# Patient Record
Sex: MALE | Race: WHITE | NOT HISPANIC OR LATINO | Employment: FULL TIME | URBAN - METROPOLITAN AREA
[De-identification: names, ages, dates, MRNs, and addresses within clinical notes are randomized per-mention and may not be internally consistent; named-entity substitution may affect disease eponyms.]

---

## 2017-03-25 ENCOUNTER — ALLSCRIPTS OFFICE VISIT (OUTPATIENT)
Dept: OTHER | Facility: OTHER | Age: 52
End: 2017-03-25

## 2017-04-03 ENCOUNTER — ALLSCRIPTS OFFICE VISIT (OUTPATIENT)
Dept: OTHER | Facility: OTHER | Age: 52
End: 2017-04-03

## 2017-04-03 LAB
FLUAV AG SPEC QL IA: NEGATIVE
INFLUENZA B AG (HISTORICAL): NEGATIVE

## 2017-05-23 ENCOUNTER — GENERIC CONVERSION - ENCOUNTER (OUTPATIENT)
Dept: OTHER | Facility: OTHER | Age: 52
End: 2017-05-23

## 2017-06-06 ENCOUNTER — GENERIC CONVERSION - ENCOUNTER (OUTPATIENT)
Dept: OTHER | Facility: OTHER | Age: 52
End: 2017-06-06

## 2017-12-21 ENCOUNTER — ALLSCRIPTS OFFICE VISIT (OUTPATIENT)
Dept: OTHER | Facility: OTHER | Age: 52
End: 2017-12-21

## 2017-12-22 NOTE — PROGRESS NOTES
Assessment   1  Deviated nasal septum (470) (J34 2)   2  Chronic sinusitis (473 9) (J32 9)    Plan   Chronic sinusitis, Deviated nasal septum    · Amoxicillin-Pot Clavulanate 875-125 MG Oral Tablet (Augmentin); TAKE 1 TABLET    EVERY 12 HOURS UNTIL GONE   · 2 - Mary Robbins DO (Otolaryngology) Co-Management  *  Status: Hold For -    Scheduling  Requested for: [id number]  Care Summary provided  : Yes    Discussion/Summary      Discussed with patient will treat for infection and refer to specialist for further evaluation of his deviated septum and chronic sinus issues  Possible side effects of new medications were reviewed with the patient/guardian today  The treatment plan was reviewed with the patient/guardian  The patient/guardian understands and agrees with the treatment plan      Chief Complaint   Sore throat      History of Present Illness   HPI: Patient here with complaints that he is not feeling well and woke up with a sore throat and sinus complaints  Patient has been not feeling well for the past few weeks and having headaches tried OTC medications and just not going away positive sick contacts with son  Patient reports that he is concerned that his left nostril is completely collapsed and has sores on the inside of the nose and has sores and is concerned he needs to have ENT evaluation and needs to see specialist has had history of sinus surgery in the past x3  Patient has a deviated septum  Patient had seen a ENT in [facility] surgery  Patient has oversized turbinates and are affected by allergens or hot and cold admits to overuse on the afrin  Review of Systems        Constitutional: as noted in HPI       ENT: as noted in HPI  Cardiovascular: no complaints of slow or fast heart rate, no chest pain, no palpitations, no leg claudication or lower extremity edema  Respiratory: no complaints of shortness of breath, no wheezing or cough, no dyspnea on exertion, no orthopnea or PND  Gastrointestinal: no complaints of abdominal pain, no constipation, no nausea or vomiting, no diarrhea or bloody stools  ROS reviewed  Active Problems   1  Acute maxillary sinusitis (461 0) (J01 00)   2  Allergic rhinitis due to pollen (477 0) (J30 1)   3  Anxiety disorder (300 00) (F41 9)   4  Benign localized prostatic hyperplasia with lower urinary tract symptoms (LUTS)     (600 21,599 69) (N40 1)   5  Colon cancer screening (V76 51) (Z12 11)   6  Cough (786 2) (R05)   7  Hyperlipidemia (272 4) (E78 5)   8  Testicular hypogonadism (257 2) (E29 1)    Past Medical History   1  Benign localized prostatic hyperplasia with lower urinary tract symptoms (LUTS)     (600 21,599 69) (N40 1)   2  History of Fever and chills (780 60) (R50 9)   3  Testicular hypogonadism (257 2) (E29 1)  Active Problems And Past Medical History Reviewed: The active problems and past medical history were reviewed and updated today  Family History   Mother    1  Family history of glaucoma (V19 11) (Z83 511)   2  Denied: Family history of substance abuse   3  Denied: Family history of Mental problems  Father    4  Denied: Family history of substance abuse   5  Denied: Family history of Mental problems  Family History Reviewed: The family history was reviewed and updated today  Social History    · Denied: Alcohol Use (History)   · Caffeine Use   · Former smoker (W84 24) (C23 850)  The social history was reviewed and updated today  Surgical History   1  History of Surgery Vas Deferens Vasectomy  Surgical History Reviewed: The surgical history was reviewed and updated today  Current Meds    1  Atorvastatin Calcium 10 MG Oral Tablet; take 1 tablet every day; Therapy: [id number] to (Brigitte Fontana)  Requested for: 65ROI5827; Last     Rx:03Oct2016 Ordered   2  Cheratussin -10 MG/5ML Oral Syrup; TAKE 5 ML EVERY 4 TO 6 HOURS AS     NEEDED FOR COUGH;      Therapy: 87GMH6842 to (Evaluate:13Apr2017); Last Rx:03Apr2017 Ordered   3  Fluticasone Propionate 50 MCG/ACT Nasal Suspension; USE 2 SPRAYS IN EACH     NOSTRIL DAILY; Therapy: 96WZL5232 to (Last Rx:26Nov2017)  Requested for: 35MEP6515 Ordered   4  PARoxetine HCl - 30 MG Oral Tablet; take 2 tablets daily; Therapy: 72WXY8199 to (Evaluate:02Jan2018)  Requested for: 57Ytr3407; Last     Rx:[id number] Ordered     The medication list was reviewed and updated today  Allergies   1  Bactrim TABS   2  Demerol TABS   3  Quinolones    Vitals    Recorded: 28Nbt7610 10:11AM   Temperature 97 6 F   Heart Rate 82   Systolic 912   Diastolic 86   Height 5 ft 8 in   Weight 204 lb 2 08 oz   BMI Calculated 31 04   BSA Calculated 2 07   O2 Saturation 96     Physical Exam        Constitutional      General appearance: No acute distress, well appearing and well nourished  Eyes      Conjunctiva and lids: No swelling, erythema, or discharge  Pupils and irises: Equal, round and reactive to light  Ears, Nose, Mouth, and Throat      External inspection of ears and nose: Abnormal   External nose exam: deformity the left ala was collapsed-- and-- the dorsum was deviated  Otoscopic examination: Tympanic membrance translucent with normal light reflex  Canals patent without erythema  Nasal mucosa, septum, and turbinates: Abnormal   There was a purulent discharge from both nares  The bilateral nasal mucosa was boggy  examination of the septum showed deviation to the left      Oropharynx: Normal with no erythema, edema, exudate or lesions  Pulmonary      Respiratory effort: No increased work of breathing or signs of respiratory distress  Auscultation of lungs: Clear to auscultation, equal breath sounds bilaterally, no wheezes, no rales, no rhonci  Cardiovascular      Auscultation of heart: Normal rate and rhythm, normal S1 and S2, without murmurs         Examination of extremities for edema and/or varicosities: Normal        Abdomen Abdomen: Non-tender, no masses  Liver and spleen: No hepatomegaly or splenomegaly  Lymphatic      Palpation of lymph nodes in neck: No lymphadenopathy  Message   Return to work or school:    Philipp Benito is under my professional care   He was seen in my office on 12/21/17    He is able to return to work on  12/26/17             Signatures    Electronically signed by : Barbra Ford ; Dec 21 2017 11:13AM EST                       (Author)     Electronically signed by : Dash Fenton MD; Dec 21 2017 11:53AM EST                       (Co-author)

## 2018-01-12 VITALS
WEIGHT: 207 LBS | DIASTOLIC BLOOD PRESSURE: 78 MMHG | HEART RATE: 87 BPM | OXYGEN SATURATION: 98 % | SYSTOLIC BLOOD PRESSURE: 118 MMHG | BODY MASS INDEX: 31.37 KG/M2 | TEMPERATURE: 97.8 F | HEIGHT: 68 IN

## 2018-01-13 VITALS
BODY MASS INDEX: 31.37 KG/M2 | SYSTOLIC BLOOD PRESSURE: 118 MMHG | OXYGEN SATURATION: 95 % | HEART RATE: 86 BPM | TEMPERATURE: 99 F | HEIGHT: 68 IN | DIASTOLIC BLOOD PRESSURE: 82 MMHG | WEIGHT: 207 LBS

## 2018-01-13 NOTE — RESULT NOTES
Verified Results  (1) CBC/PLT/DIFF 01Oct2016 07:56AM Zack Prado     Test Name Result Flag Reference   WBC 4 6 x10E3/uL  3 4-10 8   RBC 4 07 x10E6/uL L 4 14-5 80   Hemoglobin 13 2 g/dL  12 6-17 7   Hematocrit 39 5 %  37 5-51 0   MCV 97 fL  79-97   MCH 32 4 pg  26 6-33 0   MCHC 33 4 g/dL  31 5-35 7   RDW 13 4 %  12 3-15 4   Platelets 404 S75G4/MR  150-379   Neutrophils 62 %     Lymphs 25 %     Monocytes 9 %     Eos 3 %     Basos 1 %     Neutrophils (Absolute) 2 9 x10E3/uL  1 4-7 0   Lymphs (Absolute) 1 2 x10E3/uL  0 7-3 1   Monocytes(Absolute) 0 4 x10E3/uL  0 1-0 9   Eos (Absolute) 0 2 x10E3/uL  0 0-0 4   Baso (Absolute) 0 0 x10E3/uL  0 0-0 2   Immature Granulocytes 0 %     Immature Grans (Abs) 0 0 x10E3/uL  0 0-0 1     (1) COMPREHENSIVE METABOLIC PANEL [id number] 36:39PX Zack Prado     Test Name Result Flag Reference   Glucose, Serum 97 mg/dL  65-99   BUN 12 mg/dL  6-24   Creatinine, Serum 0 94 mg/dL  0 76-1 27   eGFR If NonAfricn Am 93 mL/min/1 73  >59   eGFR If Africn Am 108 mL/min/1 73  >59   BUN/Creatinine Ratio 13  9-20   Sodium, Serum 140 mmol/L  134-144   Potassium, Serum 4 0 mmol/L  3 5-5 2   Chloride, Serum 100 mmol/L     Carbon Dioxide, Total 20 mmol/L  18-29   Calcium, Serum 9 5 mg/dL  8 7-10 2   Protein, Total, Serum 6 6 g/dL  6 0-8 5   Albumin, Serum 4 5 g/dL  3 5-5 5   Globulin, Total 2 1 g/dL  1 5-4 5   A/G Ratio 2 1  1 1-2 5   Bilirubin, Total 0 7 mg/dL  0 0-1 2   Alkaline Phosphatase, S 70 IU/L     AST (SGOT) 21 IU/L  0-40   ALT (SGPT) 21 IU/L  0-44     (LC) Lipid Panel [id number] 07:56AM Zack Prado     Test Name Result Flag Reference   Cholesterol, Total 263 mg/dL H 100-199   Triglycerides 97 mg/dL  0-149   HDL Cholesterol 60 mg/dL  >39   According to ATP-III Guidelines, HDL-C >59 mg/dL is considered a  negative risk factor for CHD  VLDL Cholesterol Sina 19 mg/dL  5-40   LDL Cholesterol Calc 184 mg/dL H 0-99   Comment: Comment     Possible Familial Hypercholesterolemia  FH should be suspected when  fasting LDL cholesterol is above 189 mg/dL or non-HDL cholesterol  is above 219 mg/dL  A family history of high cholesterol and heart  disease in 1st degree relatives should be collected  J Clin Lipidol  2011;5:133-140     (1) LDL,DIRECT [id number] 07:56AM Garrie Anes     Test Name Result Flag Reference   LDL Chol  (Direct) 204 mg/dL H 0-99     (LC) TSH Rfx on Abnormal to Free T4 01Oct2016 07:56AM Garrie Anes     Test Name Result Flag Reference   TSH 0 762 uIU/mL  0 450-4 500     [facility]) Beverly Rodriguez TKI09 Default [id number] 07:56AM Garrie Anes     Test Name Result Flag Reference   Beverly Rodriguez CMP14 Default Comment     A hand-written panel/profile was received from your office  In  accordance with the [facility] Ambiguous Test Code Policy dated July 2808, we have completed your order by using the closest currently  or formerly recognized AMA panel  We have assigned Comprehensive  Metabolic Panel (14), Test Code #835839 to this request   If this  is not the testing you wished to receive on this specimen, please  contact the [facility] Client Inquiry/Technical Services Department  to clarify the test order  We appreciate your business  [facility]) Beverly Rodriguez LP Default [id number] 07:56AM Aubreyrie Anes     Test Name Result Flag Reference   Ambig Abbrev LP Default Comment     A hand-written panel/profile was received from your office  In  accordance with the [facility] Ambiguous Test Code Policy dated July 5102, we have completed your order by using the closest currently  or formerly recognized AMA panel  We have assigned Lipid Panel,  Test Code #113953 to this request  If this is not the testing you  wished to receive on this specimen, please contact the [facility]  Client Inquiry/Technical Services Department to clarify the test  order  We appreciate your business         Discussion/Summary   labs are good other than the bad cholesterol is very elevated goal is about half what the reading is I would strongly reccomend a statin to protect from cv disease and recheck in three months

## 2018-01-22 VITALS
DIASTOLIC BLOOD PRESSURE: 86 MMHG | HEART RATE: 82 BPM | TEMPERATURE: 97.6 F | SYSTOLIC BLOOD PRESSURE: 124 MMHG | WEIGHT: 204.13 LBS | OXYGEN SATURATION: 96 % | HEIGHT: 68 IN | BODY MASS INDEX: 30.94 KG/M2

## 2018-01-23 NOTE — MISCELLANEOUS
Message  Return to work or school:    Found is under my professional care   He was seen in my office on 12/21/17   He is able to return to work on  12/26/17            Signatures   Electronically signed by : Anselmo Rubinstein, 10 Demi Aceves; Dec 21 2017 11:13AM EST                       (Author)

## 2018-02-13 ENCOUNTER — OFFICE VISIT (OUTPATIENT)
Dept: FAMILY MEDICINE CLINIC | Facility: CLINIC | Age: 53
End: 2018-02-13
Payer: COMMERCIAL

## 2018-02-13 VITALS
TEMPERATURE: 98.5 F | OXYGEN SATURATION: 96 % | HEART RATE: 101 BPM | BODY MASS INDEX: 31.04 KG/M2 | SYSTOLIC BLOOD PRESSURE: 112 MMHG | WEIGHT: 204.8 LBS | DIASTOLIC BLOOD PRESSURE: 84 MMHG | HEIGHT: 68 IN

## 2018-02-13 DIAGNOSIS — M77.12 LATERAL EPICONDYLITIS OF BOTH ELBOWS: ICD-10-CM

## 2018-02-13 DIAGNOSIS — J10.1 INFLUENZA B: Primary | ICD-10-CM

## 2018-02-13 DIAGNOSIS — M77.11 LATERAL EPICONDYLITIS OF BOTH ELBOWS: ICD-10-CM

## 2018-02-13 DIAGNOSIS — R50.9 FEVER AND CHILLS: ICD-10-CM

## 2018-02-13 LAB
SL AMB POCT RAPID FLU A: NORMAL
SL AMB POCT RAPID FLU B: POSITIVE

## 2018-02-13 PROCEDURE — 87804 INFLUENZA ASSAY W/OPTIC: CPT | Performed by: NURSE PRACTITIONER

## 2018-02-13 PROCEDURE — 99214 OFFICE O/P EST MOD 30 MIN: CPT | Performed by: NURSE PRACTITIONER

## 2018-02-13 RX ORDER — PAROXETINE 30 MG/1
60 TABLET, FILM COATED ORAL DAILY
Refills: 3 | COMMUNITY
Start: 2018-01-02 | End: 2018-05-14 | Stop reason: SDUPTHER

## 2018-02-13 RX ORDER — OSELTAMIVIR PHOSPHATE 75 MG/1
75 CAPSULE ORAL 2 TIMES DAILY
Qty: 10 CAPSULE | Refills: 0 | Status: SHIPPED | OUTPATIENT
Start: 2018-02-13 | End: 2018-02-18

## 2018-02-13 NOTE — LETTER
February 13, 2018     Patient: Darwin Vogt   YOB: 1965   Date of Visit: 2/13/2018       To Whom it May Concern:    Betina Underwood is under my professional care  He was seen in my office on 2/13/2018  He may return to work on 2/19/18  If you have any questions or concerns, please don't hesitate to call           Sincerely,          KAREL Egan        CC: No Recipients

## 2018-02-13 NOTE — PROGRESS NOTES
Assessment/Plan:    No problem-specific Assessment & Plan notes found for this encounter  Diagnoses and all orders for this visit:    Influenza B  Comments:  Tamiflu sent for patient dw patient supportive measures and call for any worsening symptoms   Orders:  -     oseltamivir (TAMIFLU) 75 mg capsule; Take 1 capsule (75 mg total) by mouth 2 (two) times a day for 5 days    Fever and chills    Lateral epicondylitis of both elbows  Comments:  DW patient will order the tennis elbow straps and discussed with patient taking NSAIDs as needed   Orders:  -     Tennis elbow strap    Other orders  -     PARoxetine (PAXIL) 30 mg tablet; Take 60 mg by mouth daily          Subjective:      Patient ID: Neal Zhang is a 48 y o  male  Patient here today and reports that he went to work this morning and started feeling headache and body aches sinus headache and sore throat and chills and feeling unwell  Patient eating and drinking and positive 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  Patient also having troubles with his both arms patient having pains in both arms and located by the elbows and doing lots of work with his arms and hands no injuries that he is aware of  History of having tennis elbow in the left arm  And wears a band at times and not wearing currently  No neck pain but some stiffness  The following portions of the patient's history were reviewed and updated as appropriate: He  has no past medical history on file  He  does not have any pertinent problems on file  He  has no past surgical history on file  His family history is not on file  He  reports that he has quit smoking  He does not have any smokeless tobacco history on file  His alcohol and drug histories are not on file  He is allergic to meperidine; quinolones; and sulfamethoxazole-trimethoprim       Review of Systems   Constitutional: Positive for chills, fatigue and fever   Negative for activity change, appetite change, diaphoresis and unexpected weight change  HENT: Positive for postnasal drip, rhinorrhea, sinus pain and sinus pressure  Negative for congestion, ear discharge, ear pain, facial swelling, hearing loss, sneezing and sore throat  Eyes: Negative  Negative for pain, redness and visual disturbance  Respiratory: Negative  Negative for cough and shortness of breath  Cardiovascular: Negative  Negative for chest pain and leg swelling  Gastrointestinal: Negative  Negative for abdominal pain, diarrhea, nausea and vomiting  Endocrine: Negative  Genitourinary: Negative  Musculoskeletal: Positive for arthralgias  Skin: Negative  Neurological: Positive for dizziness and weakness  Negative for light-headedness  Psychiatric/Behavioral: Negative for behavioral problems and dysphoric mood  Objective:    Vitals:    02/13/18 1308   BP: 112/84   Pulse: 101   Temp: 98 5 °F (36 9 °C)   SpO2: 96%        Physical Exam   Constitutional: He is oriented to person, place, and time  Vital signs are normal  He appears well-developed and well-nourished  No distress  Fatigued appearance    HENT:   Head: Normocephalic and atraumatic  Eyes: Pupils are equal, round, and reactive to light  Neck: Normal range of motion  No thyromegaly present  Cardiovascular: Normal rate, regular rhythm, normal heart sounds and intact distal pulses  No murmur heard  Pulmonary/Chest: Effort normal and breath sounds normal  No respiratory distress  He has no wheezes  Abdominal: Soft  Bowel sounds are normal    Musculoskeletal: Normal range of motion  Lateral epicondyle tenderness bilateral    Neurological: He is alert and oriented to person, place, and time  Skin: Skin is warm and dry  Psychiatric: He has a normal mood and affect  Nursing note and vitals reviewed

## 2018-02-13 NOTE — PATIENT INSTRUCTIONS
Complications of Infection   AMBULATORY CARE:   Complications of infection  can happen if an infection is not diagnosed and treated early  Some infections may have complications even when they are treated early  The infection can spread from one place in your body to the entire body through your bloodstream  Early diagnosis and treatment may prevent complications such as bacteremia, sepsis, and septic shock  These are serious, life-threatening conditions that need immediate treatment  · Bacteremia  is when there is bacteria in the blood  Bacteremia can happen when infections in other parts of the body, such as the lungs, kidneys, or skin, travel to the blood  It can also happen when indwelling catheters, such as a central venous access devices, pacemaker wires, or urinary catheters become infected  A central venous access device is a special IV that is placed in a large vein and left there for an extended period of time  · Sepsis  happens when an infection spreads and causes the body to react strongly to the germs  The body's defense system normally releases chemicals to fight off infection at the infected area  In sepsis, chemicals are released throughout the body  The chemicals cause inflammation and can cause clotting in small blood vessels that is difficult to control  Inflammation and clotting decreases blood flow and oxygen to organs  This may cause them to stop working correctly  Sepsis is also called systemic inflammatory response syndrome (SIRS) due to infection  · Septic shock  is a severe type of sepsis that happens as sepsis gets worse and causes multiple organs to shut down  The blood pressure drops very low and organs do not get enough blood  This may cause permanent damage to organs    Signs and symptoms that an infection has become worse:   · Fever or very low body temperature with chills and violent shaking    · Swelling in the ankles or legs    · A change in mental status such as confusion, loss of consciousness, or seizures    · A fast or irregular heartbeat    · Urinating very little or not at all     · Difficulty breathing, dizziness, or weakness    · A rash or warm, red skin  Call 911 or have someone else call for any of the following:   · You have any of the following signs of a heart attack:      ¨ Squeezing, pressure, or pain in your chest that lasts longer than 5 minutes or returns    ¨ Discomfort or pain in your back, neck, jaw, stomach, or arm     ¨ Trouble breathing    ¨ Nausea or vomiting    ¨ Lightheadedness or a sudden cold sweat, especially with chest pain or trouble breathing    · You have a seizure or lose consciousness  · You have trouble breathing  · Your lips or fingernails are blue  · You feel extremely weak and have a hard time moving  Seek care immediately if:   · Your symptoms, such as fever, get worse, even if you are taking medicine to treat the infection  · You have increased swelling in your legs, feet, or abdomen  · You feel weak, dizzy, or faint  · You stop urinating or urinate very little  Contact your healthcare provider if:   · You have questions or concerns about your condition or care  Treatment  may depend on how severe the complications are  You may need monitoring and treatment in the hospital  You may  need any of the following:  · Removal or change of a catheter  may be needed to get rid of the infection  · Medicines  may be given to increase your blood pressure and blood flow to your organs  Antibiotics may be given to treat an infection  Medicines may also be given to decrease inflammation, control your blood sugar, prevent stomach ulcers, and prevent blood clots  · Surgery or other procedures  may be needed to treat problems causing sepsis or related to the complications of your infection  This may include draining an abscess or removing infected tissue    Follow up with your healthcare provider as directed:  Write down your questions so you remember to ask them during your visits  © 2017 [employer] Information is for End User's use only and may not be sold, redistributed or otherwise used for commercial purposes  All illustrations and images included in CareNotes® are the copyrighted property of A [employer]  or [employer]ine Feliciano  The above information is an  only  It is not intended as medical advice for individual conditions or treatments  Talk to your doctor, nurse or pharmacist before following any medical regimen to see if it is safe and effective for you

## 2018-05-14 DIAGNOSIS — F41.9 ANXIETY: Primary | ICD-10-CM

## 2018-05-14 RX ORDER — PAROXETINE 30 MG/1
60 TABLET, FILM COATED ORAL DAILY
Qty: 90 TABLET | Refills: 3 | Status: SHIPPED | OUTPATIENT
Start: 2018-05-14 | End: 2018-11-05 | Stop reason: SDUPTHER

## 2018-09-21 ENCOUNTER — TELEPHONE (OUTPATIENT)
Dept: FAMILY MEDICINE CLINIC | Facility: CLINIC | Age: 53
End: 2018-09-21

## 2018-09-21 NOTE — TELEPHONE ENCOUNTER
Patient's wife stated that she did get him into Ortho today - [facility] @ 1pm  Did you still want to see him?

## 2018-09-21 NOTE — TELEPHONE ENCOUNTER
Bryce Aparicio would like you to call her concerning Confucianism  He was in [facility] er (thurs 9/13-fri 9/14) and then overnight with chest and shoulder pain  They did general blood work and rib cage on the left side xray  She wanted him to be seen today but you have no openings  I told her we have Monday but she said he didn't want to take more time off of work  She thinks he needs other blood work and would like you to call her to determine what to do next, he is still having muscle pain in his shoulder and chest  They tried to get into ortho but no openings

## 2018-10-13 DIAGNOSIS — J40 BRONCHITIS: Primary | ICD-10-CM

## 2018-10-13 RX ORDER — AMOXICILLIN AND CLAVULANATE POTASSIUM 875; 125 MG/1; MG/1
1 TABLET, FILM COATED ORAL EVERY 12 HOURS SCHEDULED
Qty: 14 TABLET | Refills: 0 | Status: SHIPPED | OUTPATIENT
Start: 2018-10-13 | End: 2018-10-20

## 2018-10-13 RX ORDER — METHYLPREDNISOLONE 4 MG/1
TABLET ORAL
Qty: 21 TABLET | Refills: 0 | Status: SHIPPED | OUTPATIENT
Start: 2018-10-13 | End: 2018-12-19

## 2018-10-13 RX ORDER — GUAIFENESIN AND CODEINE PHOSPHATE 100; 10 MG/5ML; MG/5ML
10 SOLUTION ORAL 3 TIMES DAILY PRN
Qty: 180 ML | Refills: 0 | Status: SHIPPED | OUTPATIENT
Start: 2018-10-13 | End: 2018-10-23

## 2018-10-26 DIAGNOSIS — J02.0 PHARYNGITIS DUE TO STREPTOCOCCUS SPECIES: Primary | ICD-10-CM

## 2018-10-26 RX ORDER — AMOXICILLIN AND CLAVULANATE POTASSIUM 875; 125 MG/1; MG/1
1 TABLET, FILM COATED ORAL EVERY 12 HOURS SCHEDULED
Qty: 14 TABLET | Refills: 0 | Status: SHIPPED | OUTPATIENT
Start: 2018-10-26 | End: 2018-11-02

## 2018-11-03 DIAGNOSIS — F41.9 ANXIETY: ICD-10-CM

## 2018-11-05 DIAGNOSIS — F41.9 ANXIETY: ICD-10-CM

## 2018-11-05 RX ORDER — PAROXETINE 30 MG/1
60 TABLET, FILM COATED ORAL DAILY
Qty: 180 TABLET | Refills: 3 | Status: SHIPPED | OUTPATIENT
Start: 2018-11-05 | End: 2019-11-10 | Stop reason: SDUPTHER

## 2018-11-05 RX ORDER — PAROXETINE 30 MG/1
60 TABLET, FILM COATED ORAL DAILY
Qty: 90 TABLET | Refills: 3 | OUTPATIENT
Start: 2018-11-05

## 2018-12-19 ENCOUNTER — APPOINTMENT (OUTPATIENT)
Dept: RADIOLOGY | Facility: CLINIC | Age: 53
End: 2018-12-19
Payer: COMMERCIAL

## 2018-12-19 ENCOUNTER — OFFICE VISIT (OUTPATIENT)
Dept: FAMILY MEDICINE CLINIC | Facility: CLINIC | Age: 53
End: 2018-12-19
Payer: COMMERCIAL

## 2018-12-19 VITALS
HEIGHT: 68 IN | WEIGHT: 213.8 LBS | HEART RATE: 102 BPM | BODY MASS INDEX: 32.4 KG/M2 | OXYGEN SATURATION: 98 % | SYSTOLIC BLOOD PRESSURE: 116 MMHG | DIASTOLIC BLOOD PRESSURE: 78 MMHG | TEMPERATURE: 97.3 F

## 2018-12-19 DIAGNOSIS — M25.50 ARTHRALGIA, UNSPECIFIED JOINT: ICD-10-CM

## 2018-12-19 DIAGNOSIS — Z23 NEEDS FLU SHOT: ICD-10-CM

## 2018-12-19 DIAGNOSIS — J34.89 NASAL SEPTAL PERFORATION: ICD-10-CM

## 2018-12-19 DIAGNOSIS — R06.02 SHORTNESS OF BREATH: ICD-10-CM

## 2018-12-19 DIAGNOSIS — J34.2 DEVIATED SEPTUM: Primary | ICD-10-CM

## 2018-12-19 PROBLEM — J10.1 INFLUENZA B: Status: RESOLVED | Noted: 2018-02-13 | Resolved: 2018-12-19

## 2018-12-19 PROBLEM — R50.9 FEVER AND CHILLS: Status: RESOLVED | Noted: 2018-02-13 | Resolved: 2018-12-19

## 2018-12-19 PROCEDURE — 71046 X-RAY EXAM CHEST 2 VIEWS: CPT

## 2018-12-19 PROCEDURE — 90686 IIV4 VACC NO PRSV 0.5 ML IM: CPT

## 2018-12-19 PROCEDURE — 90471 IMMUNIZATION ADMIN: CPT

## 2018-12-19 PROCEDURE — 99214 OFFICE O/P EST MOD 30 MIN: CPT | Performed by: NURSE PRACTITIONER
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
## 2018-12-20 ENCOUNTER — APPOINTMENT (OUTPATIENT)
Dept: LAB | Facility: HOSPITAL | Age: 53
End: 2018-12-20
Payer: COMMERCIAL

## 2018-12-20 LAB
BILIRUB UR QL STRIP: NEGATIVE
CLARITY UR: CLEAR
COLOR UR: YELLOW
GLUCOSE UR STRIP-MCNC: NEGATIVE MG/DL
HGB UR QL STRIP.AUTO: NEGATIVE
KETONES UR STRIP-MCNC: NEGATIVE MG/DL
LEUKOCYTE ESTERASE UR QL STRIP: NEGATIVE
NITRITE UR QL STRIP: NEGATIVE
PH UR STRIP.AUTO: 7 [PH] (ref 4.5–8)
PROT UR STRIP-MCNC: NEGATIVE MG/DL
SP GR UR STRIP.AUTO: 1.01 (ref 1–1.03)
UROBILINOGEN UR QL STRIP.AUTO: 0.2 E.U./DL

## 2018-12-20 PROCEDURE — 81003 URINALYSIS AUTO W/O SCOPE: CPT | Performed by: NURSE PRACTITIONER

## 2019-01-02 ENCOUNTER — TELEPHONE (OUTPATIENT)
Dept: FAMILY MEDICINE CLINIC | Facility: CLINIC | Age: 54
End: 2019-01-02

## 2019-01-03 ENCOUNTER — TELEPHONE (OUTPATIENT)
Dept: FAMILY MEDICINE CLINIC | Facility: CLINIC | Age: 54
End: 2019-01-03

## 2019-01-03 NOTE — TELEPHONE ENCOUNTER
Gina Roy went for blood work on 12/20/2018 at [email or web address]  He called [facility] and they are looking for his labs  He will call when they call him back or he might have to have the labs done again  done

## 2019-01-03 NOTE — TELEPHONE ENCOUNTER
Katie Nino talked with [facility] and they said his name was found on the morning registry but nothing else  [facility] is still looking into this  If nothing is found by Monday then he will need to get another script for blood work

## 2019-01-03 NOTE — TELEPHONE ENCOUNTER
1/3/19 lmtcb- I spoke with Bhakti Becker from [facility] and he said he check the system and there was nothing he could find

## 2019-01-08 NOTE — TELEPHONE ENCOUNTER
Spoke with pt and he said he was going to get his labs redraw b/c Lucio Zhao said they couldn't find the labs that they bong

## 2019-01-30 DIAGNOSIS — J30.9 ALLERGIC RHINITIS, UNSPECIFIED SEASONALITY, UNSPECIFIED TRIGGER: Primary | ICD-10-CM

## 2019-01-31 RX ORDER — FLUTICASONE PROPIONATE 50 MCG
SPRAY, SUSPENSION (ML) NASAL
Qty: 48 G | Refills: 50 | Status: SHIPPED | OUTPATIENT
Start: 2019-01-31 | End: 2019-11-04 | Stop reason: ALTCHOICE

## 2019-03-13 LAB
ALBUMIN SERPL-MCNC: 4.5 G/DL (ref 3.5–5.5)
ALBUMIN/GLOB SERPL: 2 {RATIO} (ref 1.2–2.2)
ALP SERPL-CCNC: 89 IU/L (ref 39–117)
ALT SERPL-CCNC: 18 IU/L (ref 0–44)
AST SERPL-CCNC: 19 IU/L (ref 0–40)
BASOPHILS # BLD AUTO: 0.1 X10E3/UL (ref 0–0.2)
BASOPHILS NFR BLD AUTO: 1 %
BILIRUB SERPL-MCNC: 0.3 MG/DL (ref 0–1.2)
BUN SERPL-MCNC: 13 MG/DL (ref 6–24)
BUN/CREAT SERPL: 14 (ref 9–20)
CALCIUM SERPL-MCNC: 9.9 MG/DL (ref 8.7–10.2)
CHLORIDE SERPL-SCNC: 102 MMOL/L (ref 96–106)
CO2 SERPL-SCNC: 26 MMOL/L (ref 20–29)
CREAT SERPL-MCNC: 0.92 MG/DL (ref 0.76–1.27)
CRP SERPL-MCNC: 1.9 MG/L (ref 0–4.9)
EOSINOPHIL # BLD AUTO: 0.3 X10E3/UL (ref 0–0.4)
EOSINOPHIL NFR BLD AUTO: 5 %
ERYTHROCYTE [DISTWIDTH] IN BLOOD BY AUTOMATED COUNT: 13.6 % (ref 12.3–15.4)
ERYTHROCYTE [SEDIMENTATION RATE] IN BLOOD BY WESTERGREN METHOD: 5 MM/HR (ref 0–30)
GLOBULIN SER-MCNC: 2.3 G/DL (ref 1.5–4.5)
GLUCOSE SERPL-MCNC: 98 MG/DL (ref 65–99)
HCT VFR BLD AUTO: 41.4 % (ref 37.5–51)
HGB BLD-MCNC: 14.1 G/DL (ref 13–17.7)
IMM GRANULOCYTES # BLD: 0 X10E3/UL (ref 0–0.1)
IMM GRANULOCYTES NFR BLD: 0 %
LYMPHOCYTES # BLD AUTO: 1.5 X10E3/UL (ref 0.7–3.1)
LYMPHOCYTES NFR BLD AUTO: 29 %
MCH RBC QN AUTO: 33 PG (ref 26.6–33)
MCHC RBC AUTO-ENTMCNC: 34.1 G/DL (ref 31.5–35.7)
MCV RBC AUTO: 97 FL (ref 79–97)
MONOCYTES # BLD AUTO: 0.5 X10E3/UL (ref 0.1–0.9)
MONOCYTES NFR BLD AUTO: 9 %
NEUTROPHILS # BLD AUTO: 3 X10E3/UL (ref 1.4–7)
NEUTROPHILS NFR BLD AUTO: 56 %
PLATELET # BLD AUTO: 332 X10E3/UL (ref 150–379)
POTASSIUM SERPL-SCNC: 4.5 MMOL/L (ref 3.5–5.2)
PROT SERPL-MCNC: 6.8 G/DL (ref 6–8.5)
RBC # BLD AUTO: 4.27 X10E6/UL (ref 4.14–5.8)
SL AMB EGFR AFRICAN AMERICAN: 109 ML/MIN/1.73
SL AMB EGFR NON AFRICAN AMERICAN: 94 ML/MIN/1.73
SODIUM SERPL-SCNC: 142 MMOL/L (ref 134–144)
WBC # BLD AUTO: 5.3 X10E3/UL (ref 3.4–10.8)

## 2019-04-29 ENCOUNTER — TELEPHONE (OUTPATIENT)
Dept: FAMILY MEDICINE CLINIC | Facility: CLINIC | Age: 54
End: 2019-04-29

## 2019-04-29 DIAGNOSIS — Z01.84 IMMUNITY TO MEASLES, MUMPS, AND RUBELLA DETERMINED BY SEROLOGIC TEST: Primary | ICD-10-CM

## 2019-05-02 ENCOUNTER — CLINICAL SUPPORT (OUTPATIENT)
Dept: FAMILY MEDICINE CLINIC | Facility: CLINIC | Age: 54
End: 2019-05-02
Payer: COMMERCIAL

## 2019-05-02 DIAGNOSIS — Z23 ENCOUNTER FOR IMMUNIZATION: Primary | ICD-10-CM

## 2019-05-02 LAB
MEV IGG SER IA-ACNC: <25 AU/ML
MUV IGG SER IA-ACNC: 261 AU/ML
RUBV IGG SERPL IA-ACNC: <0.9 INDEX

## 2019-05-02 PROCEDURE — 90707 MMR VACCINE SC: CPT

## 2019-05-02 PROCEDURE — 90471 IMMUNIZATION ADMIN: CPT

## 2019-11-04 ENCOUNTER — OFFICE VISIT (OUTPATIENT)
Dept: FAMILY MEDICINE CLINIC | Facility: CLINIC | Age: 54
End: 2019-11-04
Payer: COMMERCIAL

## 2019-11-04 VITALS
BODY MASS INDEX: 32.55 KG/M2 | SYSTOLIC BLOOD PRESSURE: 118 MMHG | TEMPERATURE: 98.7 F | HEART RATE: 88 BPM | OXYGEN SATURATION: 97 % | DIASTOLIC BLOOD PRESSURE: 72 MMHG | WEIGHT: 214.8 LBS | RESPIRATION RATE: 16 BRPM | HEIGHT: 68 IN

## 2019-11-04 DIAGNOSIS — Z00.00 ANNUAL PHYSICAL EXAM: Primary | ICD-10-CM

## 2019-11-04 DIAGNOSIS — Z13.220 SCREENING FOR LIPID DISORDERS: ICD-10-CM

## 2019-11-04 DIAGNOSIS — Z12.11 COLON CANCER SCREENING: ICD-10-CM

## 2019-11-04 DIAGNOSIS — Z13.1 SCREENING FOR DIABETES MELLITUS: ICD-10-CM

## 2019-11-04 DIAGNOSIS — Z11.59 NEED FOR HEPATITIS C SCREENING TEST: ICD-10-CM

## 2019-11-04 DIAGNOSIS — L20.9 ATOPIC DERMATITIS, UNSPECIFIED TYPE: ICD-10-CM

## 2019-11-04 PROBLEM — M25.50 ARTHRALGIA: Status: RESOLVED | Noted: 2018-12-19 | Resolved: 2019-11-04

## 2019-11-04 PROBLEM — R06.02 SHORTNESS OF BREATH: Status: RESOLVED | Noted: 2018-12-19 | Resolved: 2019-11-04

## 2019-11-04 PROCEDURE — 99396 PREV VISIT EST AGE 40-64: CPT | Performed by: NURSE PRACTITIONER

## 2019-11-04 NOTE — PATIENT INSTRUCTIONS

## 2019-11-10 DIAGNOSIS — F41.9 ANXIETY: ICD-10-CM

## 2019-11-10 RX ORDER — PAROXETINE 30 MG/1
60 TABLET, FILM COATED ORAL DAILY
Qty: 60 TABLET | Refills: 11 | Status: SHIPPED | OUTPATIENT
Start: 2019-11-10 | End: 2020-11-09

## 2019-11-14 LAB
CHOLEST SERPL-MCNC: 280 MG/DL (ref 100–199)
GLUCOSE SERPL-MCNC: 101 MG/DL (ref 65–99)
HCV AB S/CO SERPL IA: 0.1 S/CO RATIO (ref 0–0.9)
HDLC SERPL-MCNC: 52 MG/DL
LDLC SERPL CALC-MCNC: 205 MG/DL (ref 0–99)
SL AMB VLDL CHOLESTEROL CALC: 23 MG/DL (ref 5–40)
TRIGL SERPL-MCNC: 114 MG/DL (ref 0–149)

## 2019-11-15 ENCOUNTER — TELEPHONE (OUTPATIENT)
Dept: GASTROENTEROLOGY | Facility: CLINIC | Age: 54
End: 2019-11-15

## 2019-11-15 NOTE — TELEPHONE ENCOUNTER
11/15/19  Screened by: Ulices Serrano    Referring Provider remigio cabrera     Pre- Screening: There is no height or weight on file to calculate BMI  Has patient been referred for a routine screening Colonoscopy? yes  Is the patient between 39-70 years old? yes    SCHEDULING STAFF   If the patient is between 45yrs-49yrs, please advise patient to confirm benefits/coverage with their insurance company for a routine screening colonoscopy, some insurance carriers will only cover at [phone] or older   If the patient is over 66years old, please schedule an office visit  Do you have any of the following symptoms? Rough patches in esophagus    Have you had a coronary or vascular stent within the last year? no    Have you had a heart attack or stroke in the last 6 months? no    Have you had intestinal surgery in the last 3 months? no    Do you have problems with: no    Do you use: no    Have you been hospitalized in the last Month? no    Have you been diagnosed with a bleeding disorder or anemia? no    Have you had chest pain (angina) or breathing problems  (COPD) in the last 3 months? no    Do you have any difficulty walking up a flight of stairs? yes    Have you had Kidney failure or insufficiency? no    Have you had heart valve surgery? no    Are you confined to a wheelchair? no    Do you take no    Have you been diagnosed with Diabetes or are you taking any   Diabetic medications? no    : If patient answers NO to medical questions, then schedule procedure  If patient answers YES to medical questions, then schedule office appointment  Previous Colonoscopy yes  Date and Facility of last colonoscopy?  20 years ago    Comments:

## 2020-01-20 DIAGNOSIS — J11.1 INFLUENZA: Primary | ICD-10-CM

## 2020-01-20 RX ORDER — OSELTAMIVIR PHOSPHATE 75 MG/1
75 CAPSULE ORAL 2 TIMES DAILY
Qty: 10 CAPSULE | Refills: 0 | Status: SHIPPED | OUTPATIENT
Start: 2020-01-20 | End: 2020-01-25

## 2020-02-03 DIAGNOSIS — N52.9 IMPOTENCE: Primary | ICD-10-CM

## 2020-02-03 DIAGNOSIS — N52.9 IMPOTENCE: ICD-10-CM

## 2020-02-03 RX ORDER — SILDENAFIL 100 MG/1
100 TABLET, FILM COATED ORAL DAILY PRN
Qty: 30 TABLET | Refills: 0 | Status: SHIPPED | OUTPATIENT
Start: 2020-02-03 | End: 2021-05-04 | Stop reason: ALTCHOICE

## 2020-02-03 RX ORDER — SILDENAFIL 100 MG/1
100 TABLET, FILM COATED ORAL DAILY PRN
Qty: 10 TABLET | Refills: 0 | Status: SHIPPED | OUTPATIENT
Start: 2020-02-03 | End: 2020-02-03 | Stop reason: SDUPTHER

## 2020-02-03 NOTE — TELEPHONE ENCOUNTER
Carolyn Monday called asking if you could call her back  Adwoa West wanted to see if you would order a testosterone test and then possibly order medication for him pending those results  She can be reached at [phone], he did not want to have the discussion with you while at work so he asked her to call here for you

## 2020-02-03 NOTE — TELEPHONE ENCOUNTER
They would like the script sent to [facility]    They can use [facility] and get in for maria luisa for less then 400 00 but it needs to be 100mg and for 30 tablets

## 2020-03-18 ENCOUNTER — TELEPHONE (OUTPATIENT)
Dept: UROLOGY | Facility: CLINIC | Age: 55
End: 2020-03-18

## 2020-03-18 ENCOUNTER — TELEPHONE (OUTPATIENT)
Dept: UROLOGY | Facility: MEDICAL CENTER | Age: 55
End: 2020-03-18

## 2020-03-18 NOTE — TELEPHONE ENCOUNTER
This is a new patient that was scheduled tomorrow with Dr Yady Jovel in [facility]  He called to confirm his appointment and I told him the office is closed  He did not want to reschedule at the current time

## 2020-04-04 DIAGNOSIS — J30.9 ALLERGIC RHINITIS, UNSPECIFIED SEASONALITY, UNSPECIFIED TRIGGER: ICD-10-CM

## 2020-04-06 RX ORDER — FLUTICASONE PROPIONATE 50 MCG
SPRAY, SUSPENSION (ML) NASAL
Qty: 48 G | Refills: 3 | Status: SHIPPED | OUTPATIENT
Start: 2020-04-06 | End: 2020-10-01 | Stop reason: ALTCHOICE

## 2020-07-01 ENCOUNTER — TELEPHONE (OUTPATIENT)
Dept: FAMILY MEDICINE CLINIC | Facility: CLINIC | Age: 55
End: 2020-07-01

## 2020-07-01 DIAGNOSIS — J01.00 ACUTE NON-RECURRENT MAXILLARY SINUSITIS: Primary | ICD-10-CM

## 2020-07-01 RX ORDER — AMOXICILLIN AND CLAVULANATE POTASSIUM 875; 125 MG/1; MG/1
1 TABLET, FILM COATED ORAL EVERY 12 HOURS SCHEDULED
Qty: 14 TABLET | Refills: 0 | Status: SHIPPED | OUTPATIENT
Start: 2020-07-01 | End: 2020-07-08

## 2020-07-01 NOTE — TELEPHONE ENCOUNTER
Pt lmom - that he had nose surgery in [place] and now he has a infection in his nose   He said that he tried to call the ENT who did the surgery but he is unavailable so he asked if you would be able to send in a antibiotic for him you usually send in Augmentin to  [facility]

## 2020-10-01 ENCOUNTER — TELEPHONE (OUTPATIENT)
Dept: FAMILY MEDICINE CLINIC | Facility: CLINIC | Age: 55
End: 2020-10-01

## 2020-10-01 ENCOUNTER — APPOINTMENT (OUTPATIENT)
Dept: LAB | Facility: CLINIC | Age: 55
End: 2020-10-01
Payer: COMMERCIAL

## 2020-10-01 ENCOUNTER — OFFICE VISIT (OUTPATIENT)
Dept: FAMILY MEDICINE CLINIC | Facility: CLINIC | Age: 55
End: 2020-10-01
Payer: COMMERCIAL

## 2020-10-01 VITALS
TEMPERATURE: 96.8 F | HEIGHT: 68 IN | OXYGEN SATURATION: 99 % | HEART RATE: 78 BPM | DIASTOLIC BLOOD PRESSURE: 78 MMHG | SYSTOLIC BLOOD PRESSURE: 118 MMHG | WEIGHT: 216 LBS | BODY MASS INDEX: 32.74 KG/M2

## 2020-10-01 DIAGNOSIS — E78.2 MIXED HYPERLIPIDEMIA: ICD-10-CM

## 2020-10-01 DIAGNOSIS — R42 LIGHTHEADEDNESS: ICD-10-CM

## 2020-10-01 DIAGNOSIS — J32.0 CHRONIC MAXILLARY SINUSITIS: ICD-10-CM

## 2020-10-01 DIAGNOSIS — J34.89 SINUS PAIN: ICD-10-CM

## 2020-10-01 DIAGNOSIS — Z13.1 SCREENING FOR DIABETES MELLITUS: ICD-10-CM

## 2020-10-01 DIAGNOSIS — R42 LIGHTHEADEDNESS: Primary | ICD-10-CM

## 2020-10-01 LAB
ALBUMIN SERPL BCP-MCNC: 4.1 G/DL (ref 3.5–5)
ALP SERPL-CCNC: 95 U/L (ref 46–116)
ALT SERPL W P-5'-P-CCNC: 47 U/L (ref 12–78)
ANION GAP SERPL CALCULATED.3IONS-SCNC: 4 MMOL/L (ref 4–13)
AST SERPL W P-5'-P-CCNC: 21 U/L (ref 5–45)
BASOPHILS # BLD AUTO: 0.06 THOUSANDS/ΜL (ref 0–0.1)
BASOPHILS NFR BLD AUTO: 1 % (ref 0–1)
BILIRUB SERPL-MCNC: 0.62 MG/DL (ref 0.2–1)
BUN SERPL-MCNC: 15 MG/DL (ref 5–25)
CALCIUM SERPL-MCNC: 9.4 MG/DL (ref 8.3–10.1)
CHLORIDE SERPL-SCNC: 105 MMOL/L (ref 100–108)
CHOLEST SERPL-MCNC: 278 MG/DL (ref 50–200)
CO2 SERPL-SCNC: 29 MMOL/L (ref 21–32)
CREAT SERPL-MCNC: 0.9 MG/DL (ref 0.6–1.3)
EOSINOPHIL # BLD AUTO: 0.19 THOUSAND/ΜL (ref 0–0.61)
EOSINOPHIL NFR BLD AUTO: 4 % (ref 0–6)
ERYTHROCYTE [DISTWIDTH] IN BLOOD BY AUTOMATED COUNT: 12.5 % (ref 11.6–15.1)
GFR SERPL CREATININE-BSD FRML MDRD: 96 ML/MIN/1.73SQ M
GLUCOSE P FAST SERPL-MCNC: 96 MG/DL (ref 65–99)
HCT VFR BLD AUTO: 43.8 % (ref 36.5–49.3)
HDLC SERPL-MCNC: 62 MG/DL
HGB BLD-MCNC: 14.5 G/DL (ref 12–17)
IMM GRANULOCYTES # BLD AUTO: 0.01 THOUSAND/UL (ref 0–0.2)
IMM GRANULOCYTES NFR BLD AUTO: 0 % (ref 0–2)
LDLC SERPL CALC-MCNC: 195 MG/DL (ref 0–100)
LYMPHOCYTES # BLD AUTO: 1.24 THOUSANDS/ΜL (ref 0.6–4.47)
LYMPHOCYTES NFR BLD AUTO: 25 % (ref 14–44)
MCH RBC QN AUTO: 32.7 PG (ref 26.8–34.3)
MCHC RBC AUTO-ENTMCNC: 33.1 G/DL (ref 31.4–37.4)
MCV RBC AUTO: 99 FL (ref 82–98)
MONOCYTES # BLD AUTO: 0.54 THOUSAND/ΜL (ref 0.17–1.22)
MONOCYTES NFR BLD AUTO: 11 % (ref 4–12)
NEUTROPHILS # BLD AUTO: 3.03 THOUSANDS/ΜL (ref 1.85–7.62)
NEUTS SEG NFR BLD AUTO: 59 % (ref 43–75)
NRBC BLD AUTO-RTO: 0 /100 WBCS
PLATELET # BLD AUTO: 305 THOUSANDS/UL (ref 149–390)
PMV BLD AUTO: 9.6 FL (ref 8.9–12.7)
POTASSIUM SERPL-SCNC: 4.5 MMOL/L (ref 3.5–5.3)
PROT SERPL-MCNC: 7.6 G/DL (ref 6.4–8.2)
RBC # BLD AUTO: 4.44 MILLION/UL (ref 3.88–5.62)
SODIUM SERPL-SCNC: 138 MMOL/L (ref 136–145)
TRIGL SERPL-MCNC: 105 MG/DL
WBC # BLD AUTO: 5.07 THOUSAND/UL (ref 4.31–10.16)

## 2020-10-01 PROCEDURE — 85025 COMPLETE CBC W/AUTO DIFF WBC: CPT

## 2020-10-01 PROCEDURE — 36415 COLL VENOUS BLD VENIPUNCTURE: CPT

## 2020-10-01 PROCEDURE — 80053 COMPREHEN METABOLIC PANEL: CPT

## 2020-10-01 PROCEDURE — 99213 OFFICE O/P EST LOW 20 MIN: CPT | Performed by: PHYSICIAN ASSISTANT

## 2020-10-01 PROCEDURE — 80061 LIPID PANEL: CPT

## 2020-10-01 RX ORDER — CETIRIZINE HYDROCHLORIDE 10 MG/1
10 TABLET ORAL DAILY
COMMUNITY

## 2020-10-01 RX ORDER — FLUTICASONE PROPIONATE 50 MCG
1 SPRAY, SUSPENSION (ML) NASAL DAILY
Qty: 1 BOTTLE | Refills: 1 | Status: SHIPPED | OUTPATIENT
Start: 2020-10-01 | End: 2021-02-15 | Stop reason: SDUPTHER

## 2020-10-01 RX ORDER — LORATADINE 10 MG/1
10 TABLET ORAL DAILY
COMMUNITY

## 2020-11-07 DIAGNOSIS — F41.9 ANXIETY: ICD-10-CM

## 2020-11-09 RX ORDER — PAROXETINE 30 MG/1
60 TABLET, FILM COATED ORAL DAILY
Qty: 60 TABLET | Refills: 11 | Status: SHIPPED | OUTPATIENT
Start: 2020-11-09 | End: 2021-07-02 | Stop reason: SDUPTHER

## 2020-11-11 ENCOUNTER — OFFICE VISIT (OUTPATIENT)
Dept: FAMILY MEDICINE CLINIC | Facility: CLINIC | Age: 55
End: 2020-11-11
Payer: COMMERCIAL

## 2020-11-11 VITALS
TEMPERATURE: 98.4 F | RESPIRATION RATE: 18 BRPM | OXYGEN SATURATION: 97 % | BODY MASS INDEX: 32.89 KG/M2 | DIASTOLIC BLOOD PRESSURE: 76 MMHG | HEART RATE: 86 BPM | SYSTOLIC BLOOD PRESSURE: 122 MMHG | HEIGHT: 68 IN | WEIGHT: 217 LBS

## 2020-11-11 DIAGNOSIS — Z23 NEED FOR INFLUENZA VACCINATION: ICD-10-CM

## 2020-11-11 DIAGNOSIS — E78.2 MIXED HYPERLIPIDEMIA: ICD-10-CM

## 2020-11-11 DIAGNOSIS — Z00.00 ANNUAL PHYSICAL EXAM: Primary | ICD-10-CM

## 2020-11-11 DIAGNOSIS — M65.332 TRIGGER MIDDLE FINGER OF LEFT HAND: ICD-10-CM

## 2020-11-11 DIAGNOSIS — Z12.11 COLON CANCER SCREENING: ICD-10-CM

## 2020-11-11 PROCEDURE — 99396 PREV VISIT EST AGE 40-64: CPT | Performed by: NURSE PRACTITIONER

## 2020-11-11 PROCEDURE — 90682 RIV4 VACC RECOMBINANT DNA IM: CPT | Performed by: NURSE PRACTITIONER

## 2020-11-11 PROCEDURE — 90471 IMMUNIZATION ADMIN: CPT | Performed by: NURSE PRACTITIONER

## 2020-12-04 ENCOUNTER — OFFICE VISIT (OUTPATIENT)
Dept: URGENT CARE | Facility: MEDICAL CENTER | Age: 55
End: 2020-12-04
Payer: COMMERCIAL

## 2020-12-04 VITALS — WEIGHT: 209 LBS | BODY MASS INDEX: 31.67 KG/M2 | HEIGHT: 68 IN

## 2020-12-04 DIAGNOSIS — J06.9 UPPER RESPIRATORY TRACT INFECTION, UNSPECIFIED TYPE: Primary | ICD-10-CM

## 2020-12-04 PROCEDURE — U0003 INFECTIOUS AGENT DETECTION BY NUCLEIC ACID (DNA OR RNA); SEVERE ACUTE RESPIRATORY SYNDROME CORONAVIRUS 2 (SARS-COV-2) (CORONAVIRUS DISEASE [COVID-19]), AMPLIFIED PROBE TECHNIQUE, MAKING USE OF HIGH THROUGHPUT TECHNOLOGIES AS DESCRIBED BY CMS-2020-01-R: HCPCS | Performed by: PHYSICIAN ASSISTANT

## 2020-12-04 PROCEDURE — G0382 LEV 3 HOSP TYPE B ED VISIT: HCPCS | Performed by: PHYSICIAN ASSISTANT

## 2020-12-04 PROCEDURE — 99283 EMERGENCY DEPT VISIT LOW MDM: CPT | Performed by: PHYSICIAN ASSISTANT

## 2020-12-06 LAB — SARS-COV-2 RNA SPEC QL NAA+PROBE: DETECTED

## 2020-12-08 ENCOUNTER — TELEMEDICINE (OUTPATIENT)
Dept: FAMILY MEDICINE CLINIC | Facility: CLINIC | Age: 55
End: 2020-12-08
Payer: COMMERCIAL

## 2020-12-08 DIAGNOSIS — U07.1 COVID-19: Primary | ICD-10-CM

## 2020-12-08 PROCEDURE — 99441 PR PHYS/QHP TELEPHONE EVALUATION 5-10 MIN: CPT | Performed by: PHYSICIAN ASSISTANT

## 2020-12-10 ENCOUNTER — TELEMEDICINE (OUTPATIENT)
Dept: FAMILY MEDICINE CLINIC | Facility: CLINIC | Age: 55
End: 2020-12-10
Payer: COMMERCIAL

## 2020-12-10 DIAGNOSIS — U07.1 COVID-19: Primary | ICD-10-CM

## 2020-12-10 PROCEDURE — 99213 OFFICE O/P EST LOW 20 MIN: CPT | Performed by: PHYSICIAN ASSISTANT

## 2020-12-11 ENCOUNTER — TELEMEDICINE (OUTPATIENT)
Dept: FAMILY MEDICINE CLINIC | Facility: CLINIC | Age: 55
End: 2020-12-11
Payer: COMMERCIAL

## 2020-12-11 VITALS — OXYGEN SATURATION: 99 % | HEART RATE: 84 BPM | TEMPERATURE: 97.9 F

## 2020-12-11 DIAGNOSIS — J01.00 ACUTE NON-RECURRENT MAXILLARY SINUSITIS: Primary | ICD-10-CM

## 2020-12-11 PROCEDURE — 99213 OFFICE O/P EST LOW 20 MIN: CPT | Performed by: PHYSICIAN ASSISTANT

## 2020-12-11 RX ORDER — AMOXICILLIN AND CLAVULANATE POTASSIUM 875; 125 MG/1; MG/1
1 TABLET, FILM COATED ORAL EVERY 12 HOURS SCHEDULED
Qty: 14 TABLET | Refills: 0 | Status: SHIPPED | OUTPATIENT
Start: 2020-12-11 | End: 2020-12-18

## 2021-02-15 DIAGNOSIS — J32.0 CHRONIC MAXILLARY SINUSITIS: ICD-10-CM

## 2021-02-15 DIAGNOSIS — J34.89 SINUS PAIN: ICD-10-CM

## 2021-02-15 RX ORDER — FLUTICASONE PROPIONATE 50 MCG
1 SPRAY, SUSPENSION (ML) NASAL DAILY
Qty: 1 BOTTLE | Refills: 2 | Status: SHIPPED | OUTPATIENT
Start: 2021-02-15 | End: 2021-05-04 | Stop reason: ALTCHOICE

## 2021-05-04 ENCOUNTER — TELEPHONE (OUTPATIENT)
Dept: FAMILY MEDICINE CLINIC | Facility: CLINIC | Age: 56
End: 2021-05-04

## 2021-05-04 ENCOUNTER — OFFICE VISIT (OUTPATIENT)
Dept: FAMILY MEDICINE CLINIC | Facility: CLINIC | Age: 56
End: 2021-05-04
Payer: COMMERCIAL

## 2021-05-04 VITALS
WEIGHT: 215 LBS | DIASTOLIC BLOOD PRESSURE: 84 MMHG | SYSTOLIC BLOOD PRESSURE: 132 MMHG | HEART RATE: 78 BPM | BODY MASS INDEX: 32.58 KG/M2 | HEIGHT: 68 IN | OXYGEN SATURATION: 97 % | TEMPERATURE: 98.6 F

## 2021-05-04 DIAGNOSIS — N41.0 ACUTE PROSTATITIS: Primary | ICD-10-CM

## 2021-05-04 LAB
SL AMB  POCT GLUCOSE, UA: NORMAL
SL AMB LEUKOCYTE ESTERASE,UA: NORMAL
SL AMB POCT BILIRUBIN,UA: NORMAL
SL AMB POCT BLOOD,UA: NORMAL
SL AMB POCT CLARITY,UA: CLEAR
SL AMB POCT COLOR,UA: YELLOW
SL AMB POCT KETONES,UA: NORMAL
SL AMB POCT NITRITE,UA: NORMAL
SL AMB POCT PH,UA: 7.5
SL AMB POCT SPECIFIC GRAVITY,UA: 1.01
SL AMB POCT URINE PROTEIN: NORMAL
SL AMB POCT UROBILINOGEN: 0.2

## 2021-05-04 PROCEDURE — 87086 URINE CULTURE/COLONY COUNT: CPT | Performed by: NURSE PRACTITIONER

## 2021-05-04 PROCEDURE — 81003 URINALYSIS AUTO W/O SCOPE: CPT | Performed by: NURSE PRACTITIONER

## 2021-05-04 PROCEDURE — 99213 OFFICE O/P EST LOW 20 MIN: CPT | Performed by: NURSE PRACTITIONER

## 2021-05-04 RX ORDER — TAMSULOSIN HYDROCHLORIDE 0.4 MG/1
0.4 CAPSULE ORAL
Qty: 30 CAPSULE | Refills: 5 | Status: SHIPPED | OUTPATIENT
Start: 2021-05-04 | End: 2021-06-30 | Stop reason: SDUPTHER

## 2021-05-04 RX ORDER — DOXYCYCLINE HYCLATE 100 MG/1
100 CAPSULE ORAL EVERY 12 HOURS SCHEDULED
Qty: 28 CAPSULE | Refills: 0 | Status: SHIPPED | OUTPATIENT
Start: 2021-05-04 | End: 2021-05-18

## 2021-05-04 RX ORDER — HYDROCODONE BITARTRATE AND ACETAMINOPHEN 5; 325 MG/1; MG/1
1 TABLET ORAL EVERY 6 HOURS PRN
Qty: 28 TABLET | Refills: 0 | Status: SHIPPED | OUTPATIENT
Start: 2021-05-04 | End: 2021-05-11

## 2021-05-04 NOTE — PROGRESS NOTES
Assessment/Plan:           Problem List Items Addressed This Visit        Genitourinary    Acute prostatitis - Primary     Patient IH urine was normal sent for culture patient with exquisite tenderness of prostate appearing to have recurrence of prostatitis will start Doxycyline 100 bid for 14 days, Flomax and pain medication patient has an appointment at the end of the month with urology  Patient will call with update of his symptoms in 2 weeks          Relevant Medications    doxycycline hyclate (VIBRAMYCIN) 100 mg capsule    tamsulosin (FLOMAX) 0 4 mg    HYDROcodone-acetaminophen (NORCO) 5-325 mg per tablet    Other Relevant Orders    POCT urine dip auto non-scope (Completed)    Urine culture            Subjective:      Patient ID: Ana Howe is a 64 y o  male  Patient here with reports that he has a history of prostatitis and reports no issues that he is aware of symptoms have started over a week ago  Patient has urology appointment scheduled for 5/28/2021 Patient is having pain with sitting and lower scrotal pain and perineal pain  Patient is able to urinate and slight burning when start of stream and feeling like he is not emptying out his bladder no blood in the urine  Patient is having normal BMs Patient is hydrating well  Patient has had the issue many times in the past and feeling similar to his prior episodes of prostatitis  The following portions of the patient's history were reviewed and updated as appropriate:   He  has a past medical history of Benign localized prostatic hyperplasia with lower urinary tract symptoms (LUTS) and Hypogonadism in male    He   Patient Active Problem List    Diagnosis Date Noted    Acute prostatitis 05/04/2021    COVID-19 12/08/2020    Need for influenza vaccination 11/11/2020    Trigger middle finger of left hand 11/11/2020    Lightheadedness 10/01/2020    Sinus pain 10/01/2020    Mixed hyperlipidemia 10/01/2020    Chronic maxillary sinusitis 10/01/2020    Annual physical exam 11/04/2019    Need for hepatitis C screening test 11/04/2019    Atopic dermatitis 11/04/2019    Nasal septal perforation 12/19/2018    Deviated septum 12/19/2018    Lateral epicondylitis of both elbows 02/13/2018     He  has a past surgical history that includes Vasectomy  His family history includes Glaucoma in his mother  He  reports that he has quit smoking  He has never used smokeless tobacco  He reports that he does not drink alcohol  No history on file for drug  Current Outpatient Medications   Medication Sig Dispense Refill    cetirizine (ZyrTEC) 10 mg tablet Take 10 mg by mouth daily      loratadine (Claritin) 10 mg tablet Take 10 mg by mouth daily      PARoxetine (PAXIL) 30 mg tablet TAKE 2 TABLETS (60 MG TOTAL) BY MOUTH DAILY 60 tablet 11    doxycycline hyclate (VIBRAMYCIN) 100 mg capsule Take 1 capsule (100 mg total) by mouth every 12 (twelve) hours for 14 days 28 capsule 0    HYDROcodone-acetaminophen (NORCO) 5-325 mg per tablet Take 1 tablet by mouth every 6 (six) hours as needed for pain for up to 7 daysMax Daily Amount: 4 tablets 28 tablet 0    tamsulosin (FLOMAX) 0 4 mg Take 1 capsule (0 4 mg total) by mouth daily with dinner 30 capsule 5     No current facility-administered medications for this visit  He is allergic to meperidine; quinolones; and sulfamethoxazole-trimethoprim       Review of Systems   Constitutional: Negative for activity change, appetite change, chills, diaphoresis, fatigue, fever and unexpected weight change  HENT: Negative for congestion, ear pain, hearing loss, postnasal drip, sinus pressure, sinus pain, sneezing and sore throat  Eyes: Negative for pain, redness and visual disturbance  Respiratory: Negative for cough and shortness of breath  Cardiovascular: Negative for chest pain and leg swelling  Gastrointestinal: Negative for abdominal pain, diarrhea, nausea and vomiting  Endocrine: Negative  Genitourinary: Positive for difficulty urinating, scrotal swelling and urgency  Negative for decreased urine volume, discharge, dysuria, enuresis, flank pain, frequency, genital sores, hematuria, penile pain, penile swelling and testicular pain  Musculoskeletal: Negative for arthralgias  Skin: Negative  Allergic/Immunologic: Negative  Neurological: Negative for dizziness and light-headedness  Hematological: Negative  Psychiatric/Behavioral: Negative for behavioral problems and dysphoric mood  Objective:      /84 (BP Location: Left arm, Patient Position: Sitting, Cuff Size: Large)   Pulse 78   Temp 98 6 °F (37 °C)   Ht 5' 8" (1 727 m)   Wt 97 5 kg (215 lb)   SpO2 97%   BMI 32 69 kg/m²          Physical Exam  Vitals signs reviewed  Exam conducted with a chaperone present  Constitutional:       General: He is not in acute distress  Appearance: He is well-developed  HENT:      Head: Normocephalic and atraumatic  Eyes:      Pupils: Pupils are equal, round, and reactive to light  Neck:      Musculoskeletal: Normal range of motion  Thyroid: No thyromegaly  Cardiovascular:      Rate and Rhythm: Normal rate and regular rhythm  Heart sounds: Normal heart sounds  No murmur  Pulmonary:      [place]: Pulmonary effort is normal  No respiratory distress  Breath sounds: Normal breath sounds  No wheezing  Abdominal:      General: Bowel sounds are normal       Palpations: Abdomen is soft  Genitourinary:     Pubic Area: No rash  Penis: Normal and circumcised  Scrotum/Testes: Normal  Cremasteric reflex is present  Epididymis:      Right: Normal       Left: Normal       Prostate: Enlarged and tender  Comments: Pain with sitting needs to stand for visit   Musculoskeletal: Normal range of motion  Skin:     General: Skin is warm and dry  Neurological:      Mental Status: He is alert and oriented to person, place, and time

## 2021-05-04 NOTE — TELEPHONE ENCOUNTER
Pt called, he is swollen and uncomfortable in pelvic area, believes he may have an infection  Requesting abx be sent in to pharmacy without OV, if possible

## 2021-05-04 NOTE — ASSESSMENT & PLAN NOTE
Patient IH urine was normal sent for culture patient with exquisite tenderness of prostate appearing to have recurrence of prostatitis will start Doxycyline 100 bid for 14 days, Flomax and pain medication patient has an appointment at the end of the month with urology   Patient will call with update of his symptoms in 2 weeks

## 2021-05-06 LAB — BACTERIA UR CULT: NORMAL

## 2021-05-28 ENCOUNTER — OFFICE VISIT (OUTPATIENT)
Dept: UROLOGY | Facility: MEDICAL CENTER | Age: 56
End: 2021-05-28
Payer: COMMERCIAL

## 2021-05-28 VITALS
HEIGHT: 68 IN | WEIGHT: 214 LBS | HEART RATE: 85 BPM | DIASTOLIC BLOOD PRESSURE: 80 MMHG | SYSTOLIC BLOOD PRESSURE: 110 MMHG | BODY MASS INDEX: 32.43 KG/M2

## 2021-05-28 DIAGNOSIS — N40.1 BPH WITH OBSTRUCTION/LOWER URINARY TRACT SYMPTOMS: ICD-10-CM

## 2021-05-28 DIAGNOSIS — E29.1 HYPOGONADISM MALE: Primary | ICD-10-CM

## 2021-05-28 DIAGNOSIS — R79.89 LOW TESTOSTERONE: ICD-10-CM

## 2021-05-28 DIAGNOSIS — N13.8 BPH WITH OBSTRUCTION/LOWER URINARY TRACT SYMPTOMS: ICD-10-CM

## 2021-05-28 LAB
POST-VOID RESIDUAL VOLUME, ML POC: 141 ML
SL AMB  POCT GLUCOSE, UA: NORMAL
SL AMB LEUKOCYTE ESTERASE,UA: NORMAL
SL AMB POCT BILIRUBIN,UA: NORMAL
SL AMB POCT BLOOD,UA: NORMAL
SL AMB POCT CLARITY,UA: CLEAR
SL AMB POCT COLOR,UA: YELLOW
SL AMB POCT KETONES,UA: NORMAL
SL AMB POCT NITRITE,UA: NORMAL
SL AMB POCT PH,UA: 6
SL AMB POCT SPECIFIC GRAVITY,UA: 1.01
SL AMB POCT URINE PROTEIN: NORMAL
SL AMB POCT UROBILINOGEN: 0.2

## 2021-05-28 PROCEDURE — 51798 US URINE CAPACITY MEASURE: CPT | Performed by: UROLOGY

## 2021-05-28 PROCEDURE — 81003 URINALYSIS AUTO W/O SCOPE: CPT | Performed by: UROLOGY

## 2021-05-28 PROCEDURE — 99203 OFFICE O/P NEW LOW 30 MIN: CPT | Performed by: UROLOGY

## 2021-05-28 NOTE — PROGRESS NOTES
Assessment/Plan:    BPH with obstruction/lower urinary tract symptoms  Markedly symptomatic  We will reassess this problem after his prostatitis resolves  Diagnoses and all orders for this visit:    Hypogonadism male  -     Testosterone, free, total; Future  -     Luteinizing hormone; Future  -     Sex Hormone Binding Globulin; Future  -     Estradiol; Future  -     POCT Measure PVR    Low testosterone  -     POCT urine dip auto non-scope    BPH with obstruction/lower urinary tract symptoms          Subjective:      Patient ID: Jasmin Martines is a 64 y o  male  HPI  Low testosterone: The patient reports a history of low testosterone dating back about 5 years  [facility] [place] [facility] the [facility]  He was getting shots from them  He could not submit t those injections to his insurance company  He is not exactly sure what he was getting  Notes anxiety and menopausal sx whenever he ingests soy products  Sx include a low libido, anxiety and deression which he considers to be due to low T  Pt also believes his estrogen levels go very high episodically and cause emotional instability  We do not have recent testosterone levels on him  Labs ordered  BPH:  Prostate is sore a lot and wife has had to milk it for him  Had an attack of soreness a few weeks ago and PCP placed him on abx with improvement in sx  He notes urinary frequency, intermittency, weak stream and nocturia x 4  He denies other significant urinary symptoms  He denies gross hematuria, urinary tract infections or incontinence  He is taking tamsulosin (Flomax) for his symptoms  He has been on tamsulosin for only 3 weeks and it seems to help  PVR:  141 cc         PSA:  Wait at least a month  Pt getting over prostatitis        AUA SYMPTOM SCORE      Most Recent Value   AUA SYMPTOM SCORE   How often have you had a sensation of not emptying your bladder completely after you finished urinating? 3   How often have you had to urinate again less than two hours after you finished urinating? 4   How often have you found you stopped and started again several times when you urinate? 5   How often have you found it difficult to postpone urination? 2   How often have you had a weak urinary stream?  5   How often have you had to push or strain to begin urination? 3   How many times did you most typically get up to urinate from the time you went to bed at night until the time you got up in the morning? 4   Quality of Life: If you were to spend the rest of your life with your urinary condition just the way it is now, how would you feel about that?  3   AUA SYMPTOM SCORE  26        Prostatitis:  PCP gave him doxy, tamsulosin and Vicodin for this  Now symptomatic Chiara better  The following portions of the patient's history were reviewed and updated as appropriate: allergies, current medications, past family history, past medical history, past social history, past surgical history and problem list     Review of Systems   Constitutional: Negative for activity change and fatigue  Respiratory: Negative for shortness of breath and wheezing  Cardiovascular: Negative for chest pain  Gastrointestinal: Negative for abdominal pain  Genitourinary: Negative for difficulty urinating, dysuria, frequency, hematuria and urgency  Musculoskeletal: Negative for back pain and gait problem  Skin: Negative  Allergic/Immunologic: Negative  Neurological: Negative  Psychiatric/Behavioral: Negative  Mild depression on Paxil  Objective:      /80   Pulse 85   Ht 5' 8" (1 727 m)   Wt 97 1 kg (214 lb)   BMI 32 54 kg/m²          Physical Exam  Constitutional:       Appearance: He is well-developed  HENT:      Head: Normocephalic and atraumatic  Neck:      Musculoskeletal: Normal range of motion and neck supple     Pulmonary:      Effort: Pulmonary effort is normal  Genitourinary:     Penis: Normal        Rectum: Normal       Comments: The prostate is 30 gm, firm, smooth, mildly tender  Testes palpably nl but pt did not tolerate exam well  Musculoskeletal: Normal range of motion  Skin:     General: Skin is warm and dry  Neurological:      Mental Status: He is alert and oriented to person, place, and time  Psychiatric:         Behavior: Behavior normal          Thought Content:  Thought content normal          Judgment: Judgment normal

## 2021-05-29 ENCOUNTER — APPOINTMENT (OUTPATIENT)
Dept: LAB | Facility: CLINIC | Age: 56
End: 2021-05-29
Payer: COMMERCIAL

## 2021-05-29 DIAGNOSIS — E29.1 HYPOGONADISM MALE: ICD-10-CM

## 2021-05-29 LAB
ESTRADIOL SERPL-MCNC: 30 PG/ML (ref 11–52.5)
LH SERPL-ACNC: 6.5 MIU/ML (ref 1.2–10.6)

## 2021-05-29 PROCEDURE — 83002 ASSAY OF GONADOTROPIN (LH): CPT

## 2021-05-29 PROCEDURE — 84270 ASSAY OF SEX HORMONE GLOBUL: CPT

## 2021-05-29 PROCEDURE — 84402 ASSAY OF FREE TESTOSTERONE: CPT

## 2021-05-29 PROCEDURE — 84403 ASSAY OF TOTAL TESTOSTERONE: CPT

## 2021-05-29 PROCEDURE — 36415 COLL VENOUS BLD VENIPUNCTURE: CPT

## 2021-05-29 PROCEDURE — 82670 ASSAY OF TOTAL ESTRADIOL: CPT

## 2021-05-30 PROBLEM — E29.1 HYPOGONADISM MALE: Status: ACTIVE | Noted: 2021-05-30

## 2021-05-30 PROBLEM — N40.1 BPH WITH OBSTRUCTION/LOWER URINARY TRACT SYMPTOMS: Status: ACTIVE | Noted: 2021-05-30

## 2021-05-30 PROBLEM — N13.8 BPH WITH OBSTRUCTION/LOWER URINARY TRACT SYMPTOMS: Status: ACTIVE | Noted: 2021-05-30

## 2021-06-01 LAB
TESTOST FREE SERPL-MCNC: 7.3 PG/ML (ref 7.2–24)
TESTOST SERPL-MCNC: 365 NG/DL (ref 264–916)

## 2021-06-02 LAB — SHBG SERPL-SCNC: 37.3 NMOL/L (ref 19.3–76.4)

## 2021-06-30 DIAGNOSIS — N41.0 ACUTE PROSTATITIS: ICD-10-CM

## 2021-06-30 RX ORDER — TAMSULOSIN HYDROCHLORIDE 0.4 MG/1
0.4 CAPSULE ORAL
Qty: 90 CAPSULE | Refills: 1 | Status: SHIPPED | OUTPATIENT
Start: 2021-06-30 | End: 2021-12-20

## 2021-07-02 DIAGNOSIS — F41.9 ANXIETY: ICD-10-CM

## 2021-07-02 RX ORDER — PAROXETINE 30 MG/1
60 TABLET, FILM COATED ORAL DAILY
Qty: 180 TABLET | Refills: 2 | Status: SHIPPED | OUTPATIENT
Start: 2021-07-02 | End: 2022-03-18

## 2021-07-07 ENCOUNTER — APPOINTMENT (OUTPATIENT)
Dept: LAB | Facility: CLINIC | Age: 56
End: 2021-07-07
Payer: COMMERCIAL

## 2021-07-07 DIAGNOSIS — R79.89 LOW TESTOSTERONE: ICD-10-CM

## 2021-07-07 DIAGNOSIS — E29.1 HYPOGONADISM MALE: ICD-10-CM

## 2021-07-07 LAB
ESTRADIOL SERPL-MCNC: 28 PG/ML (ref 11–52.5)
LH SERPL-ACNC: 4.5 MIU/ML (ref 1.2–10.6)

## 2021-07-07 PROCEDURE — 83002 ASSAY OF GONADOTROPIN (LH): CPT

## 2021-07-07 PROCEDURE — 84270 ASSAY OF SEX HORMONE GLOBUL: CPT

## 2021-07-07 PROCEDURE — 36415 COLL VENOUS BLD VENIPUNCTURE: CPT

## 2021-07-07 PROCEDURE — 82670 ASSAY OF TOTAL ESTRADIOL: CPT

## 2021-07-07 PROCEDURE — 84403 ASSAY OF TOTAL TESTOSTERONE: CPT

## 2021-07-07 PROCEDURE — 84402 ASSAY OF FREE TESTOSTERONE: CPT

## 2021-07-08 LAB
SHBG SERPL-SCNC: 39.3 NMOL/L (ref 19.3–76.4)
TESTOST FREE SERPL-MCNC: 8.2 PG/ML (ref 7.2–24)
TESTOST SERPL-MCNC: 282 NG/DL (ref 264–916)

## 2021-07-12 ENCOUNTER — TELEPHONE (OUTPATIENT)
Dept: UROLOGY | Facility: MEDICAL CENTER | Age: 56
End: 2021-07-12

## 2021-07-12 NOTE — TELEPHONE ENCOUNTER
Called patient - he is told of his testosterone levels  He was told that Dr Jonatan Sparks would like him to see a endocrinologist   He states that he saw an endocrinologist in the past who did nothing for him  He was upset stating that no one in the medical field will treat a person who has depression with a testosterone under 400  He states that if he is going to see someone for this he will do it on his own

## 2021-07-12 NOTE — TELEPHONE ENCOUNTER
----- Message from Trista Burns MD sent at 7/10/2021 12:13 PM EDT -----  The patient's testosterone level remains normal   I cannot really explain his symptoms based on this lab work  His estradiol level is also normal although the patient feels that his estrogen levels rise episodically  This is clearly a very complex problem  Please offer the patient a referral to endocrinology  Thank you

## 2021-07-13 NOTE — TELEPHONE ENCOUNTER
Patient sent a message requesting a referral to endocrinology        Can you please place this referral

## 2021-08-02 ENCOUNTER — TELEPHONE (OUTPATIENT)
Dept: FAMILY MEDICINE CLINIC | Facility: CLINIC | Age: 56
End: 2021-08-02

## 2021-08-02 DIAGNOSIS — N41.0 ACUTE PROSTATITIS: Primary | ICD-10-CM

## 2021-08-02 RX ORDER — HYDROCODONE BITARTRATE AND ACETAMINOPHEN 5; 325 MG/1; MG/1
1 TABLET ORAL EVERY 6 HOURS PRN
Qty: 20 TABLET | Refills: 0 | Status: SHIPPED | OUTPATIENT
Start: 2021-08-02 | End: 2021-08-07

## 2021-08-02 RX ORDER — DOXYCYCLINE HYCLATE 100 MG/1
100 CAPSULE ORAL EVERY 12 HOURS SCHEDULED
Qty: 14 CAPSULE | Refills: 0 | Status: SHIPPED | OUTPATIENT
Start: 2021-08-02 | End: 2021-08-09

## 2021-08-02 NOTE — TELEPHONE ENCOUNTER
Rxs sent for doxy and norco f/u with urology detailed exam decreased ROM/diminished strength negative...

## 2021-08-02 NOTE — TELEPHONE ENCOUNTER
Pt called, he is having a flair up with his prostatitis again and would like to know if you can send in the Doxycycline 100mg and the [place] 5/325mg to his pharmacy? Pt is trying to get a hold of urology but has not had any luck

## 2021-11-02 ENCOUNTER — TELEMEDICINE (OUTPATIENT)
Dept: FAMILY MEDICINE CLINIC | Facility: CLINIC | Age: 56
End: 2021-11-02
Payer: COMMERCIAL

## 2021-11-02 DIAGNOSIS — J01.90 ACUTE SINUSITIS, RECURRENCE NOT SPECIFIED, UNSPECIFIED LOCATION: Primary | ICD-10-CM

## 2021-11-02 PROCEDURE — 99213 OFFICE O/P EST LOW 20 MIN: CPT | Performed by: PHYSICIAN ASSISTANT

## 2021-11-02 RX ORDER — AMOXICILLIN AND CLAVULANATE POTASSIUM 875; 125 MG/1; MG/1
1 TABLET, FILM COATED ORAL EVERY 12 HOURS SCHEDULED
Qty: 20 TABLET | Refills: 0 | Status: SHIPPED | OUTPATIENT
Start: 2021-11-02 | End: 2021-11-12

## 2021-11-15 ENCOUNTER — OFFICE VISIT (OUTPATIENT)
Dept: FAMILY MEDICINE CLINIC | Facility: CLINIC | Age: 56
End: 2021-11-15
Payer: COMMERCIAL

## 2021-11-15 VITALS
DIASTOLIC BLOOD PRESSURE: 82 MMHG | HEIGHT: 68 IN | BODY MASS INDEX: 32.58 KG/M2 | TEMPERATURE: 98.7 F | SYSTOLIC BLOOD PRESSURE: 116 MMHG | WEIGHT: 215 LBS | OXYGEN SATURATION: 98 % | HEART RATE: 78 BPM

## 2021-11-15 DIAGNOSIS — Z23 NEEDS FLU SHOT: ICD-10-CM

## 2021-11-15 DIAGNOSIS — Z00.00 ANNUAL PHYSICAL EXAM: Primary | ICD-10-CM

## 2021-11-15 DIAGNOSIS — E78.2 MIXED HYPERLIPIDEMIA: ICD-10-CM

## 2021-11-15 PROBLEM — R42 LIGHTHEADEDNESS: Status: RESOLVED | Noted: 2020-10-01 | Resolved: 2021-11-15

## 2021-11-15 PROBLEM — J34.89 SINUS PAIN: Status: RESOLVED | Noted: 2020-10-01 | Resolved: 2021-11-15

## 2021-11-15 PROBLEM — L20.9 ATOPIC DERMATITIS: Status: RESOLVED | Noted: 2019-11-04 | Resolved: 2021-11-15

## 2021-11-15 PROBLEM — M65.332 TRIGGER MIDDLE FINGER OF LEFT HAND: Status: RESOLVED | Noted: 2020-11-11 | Resolved: 2021-11-15

## 2021-11-15 PROBLEM — Z11.59 NEED FOR HEPATITIS C SCREENING TEST: Status: RESOLVED | Noted: 2019-11-04 | Resolved: 2021-11-15

## 2021-11-15 PROBLEM — M77.12 LATERAL EPICONDYLITIS OF BOTH ELBOWS: Status: RESOLVED | Noted: 2018-02-13 | Resolved: 2021-11-15

## 2021-11-15 PROBLEM — N41.0 ACUTE PROSTATITIS: Status: RESOLVED | Noted: 2021-05-04 | Resolved: 2021-11-15

## 2021-11-15 PROBLEM — M77.11 LATERAL EPICONDYLITIS OF BOTH ELBOWS: Status: RESOLVED | Noted: 2018-02-13 | Resolved: 2021-11-15

## 2021-11-15 PROCEDURE — 99396 PREV VISIT EST AGE 40-64: CPT | Performed by: NURSE PRACTITIONER

## 2021-11-15 PROCEDURE — 90682 RIV4 VACC RECOMBINANT DNA IM: CPT | Performed by: NURSE PRACTITIONER

## 2021-11-15 PROCEDURE — 90471 IMMUNIZATION ADMIN: CPT | Performed by: NURSE PRACTITIONER

## 2021-11-27 ENCOUNTER — APPOINTMENT (OUTPATIENT)
Dept: LAB | Facility: CLINIC | Age: 56
End: 2021-11-27
Payer: COMMERCIAL

## 2021-11-27 DIAGNOSIS — Z00.00 ANNUAL PHYSICAL EXAM: ICD-10-CM

## 2021-11-27 DIAGNOSIS — E78.2 MIXED HYPERLIPIDEMIA: ICD-10-CM

## 2021-11-27 LAB
ALBUMIN SERPL BCP-MCNC: 4 G/DL (ref 3.5–5)
ALP SERPL-CCNC: 86 U/L (ref 46–116)
ALT SERPL W P-5'-P-CCNC: 31 U/L (ref 12–78)
ANION GAP SERPL CALCULATED.3IONS-SCNC: 3 MMOL/L (ref 4–13)
AST SERPL W P-5'-P-CCNC: 21 U/L (ref 5–45)
BASOPHILS # BLD AUTO: 0.06 THOUSANDS/ΜL (ref 0–0.1)
BASOPHILS NFR BLD AUTO: 1 % (ref 0–1)
BILIRUB SERPL-MCNC: 0.49 MG/DL (ref 0.2–1)
BUN SERPL-MCNC: 12 MG/DL (ref 5–25)
CALCIUM SERPL-MCNC: 9.3 MG/DL (ref 8.3–10.1)
CHLORIDE SERPL-SCNC: 105 MMOL/L (ref 100–108)
CHOLEST SERPL-MCNC: 259 MG/DL
CO2 SERPL-SCNC: 29 MMOL/L (ref 21–32)
CREAT SERPL-MCNC: 0.95 MG/DL (ref 0.6–1.3)
EOSINOPHIL # BLD AUTO: 0.23 THOUSAND/ΜL (ref 0–0.61)
EOSINOPHIL NFR BLD AUTO: 4 % (ref 0–6)
ERYTHROCYTE [DISTWIDTH] IN BLOOD BY AUTOMATED COUNT: 12.3 % (ref 11.6–15.1)
GFR SERPL CREATININE-BSD FRML MDRD: 89 ML/MIN/1.73SQ M
GLUCOSE P FAST SERPL-MCNC: 96 MG/DL (ref 65–99)
HCT VFR BLD AUTO: 43.4 % (ref 36.5–49.3)
HDLC SERPL-MCNC: 56 MG/DL
HGB BLD-MCNC: 14.4 G/DL (ref 12–17)
IMM GRANULOCYTES # BLD AUTO: 0.02 THOUSAND/UL (ref 0–0.2)
IMM GRANULOCYTES NFR BLD AUTO: 0 % (ref 0–2)
LDLC SERPL CALC-MCNC: 178 MG/DL (ref 0–100)
LYMPHOCYTES # BLD AUTO: 1.35 THOUSANDS/ΜL (ref 0.6–4.47)
LYMPHOCYTES NFR BLD AUTO: 24 % (ref 14–44)
MCH RBC QN AUTO: 33.3 PG (ref 26.8–34.3)
MCHC RBC AUTO-ENTMCNC: 33.2 G/DL (ref 31.4–37.4)
MCV RBC AUTO: 100 FL (ref 82–98)
MONOCYTES # BLD AUTO: 0.47 THOUSAND/ΜL (ref 0.17–1.22)
MONOCYTES NFR BLD AUTO: 8 % (ref 4–12)
NEUTROPHILS # BLD AUTO: 3.49 THOUSANDS/ΜL (ref 1.85–7.62)
NEUTS SEG NFR BLD AUTO: 63 % (ref 43–75)
NRBC BLD AUTO-RTO: 0 /100 WBCS
PLATELET # BLD AUTO: 324 THOUSANDS/UL (ref 149–390)
PMV BLD AUTO: 9.1 FL (ref 8.9–12.7)
POTASSIUM SERPL-SCNC: 4.2 MMOL/L (ref 3.5–5.3)
PROT SERPL-MCNC: 7.7 G/DL (ref 6.4–8.2)
RBC # BLD AUTO: 4.33 MILLION/UL (ref 3.88–5.62)
SODIUM SERPL-SCNC: 137 MMOL/L (ref 136–145)
TRIGL SERPL-MCNC: 127 MG/DL
WBC # BLD AUTO: 5.62 THOUSAND/UL (ref 4.31–10.16)

## 2021-11-27 PROCEDURE — 80061 LIPID PANEL: CPT

## 2021-11-27 PROCEDURE — 85025 COMPLETE CBC W/AUTO DIFF WBC: CPT

## 2021-11-27 PROCEDURE — 80053 COMPREHEN METABOLIC PANEL: CPT

## 2021-11-27 PROCEDURE — 36415 COLL VENOUS BLD VENIPUNCTURE: CPT

## 2021-12-18 DIAGNOSIS — N41.0 ACUTE PROSTATITIS: ICD-10-CM

## 2021-12-20 RX ORDER — TAMSULOSIN HYDROCHLORIDE 0.4 MG/1
CAPSULE ORAL
Qty: 90 CAPSULE | Refills: 3 | Status: SHIPPED | OUTPATIENT
Start: 2021-12-20

## 2022-01-05 ENCOUNTER — OFFICE VISIT (OUTPATIENT)
Dept: GASTROENTEROLOGY | Facility: CLINIC | Age: 57
End: 2022-01-05
Payer: COMMERCIAL

## 2022-01-05 VITALS
DIASTOLIC BLOOD PRESSURE: 80 MMHG | BODY MASS INDEX: 32.58 KG/M2 | HEIGHT: 68 IN | WEIGHT: 215 LBS | SYSTOLIC BLOOD PRESSURE: 122 MMHG

## 2022-01-05 DIAGNOSIS — K64.4 EXTERNAL HEMORRHOID: Primary | ICD-10-CM

## 2022-01-05 DIAGNOSIS — Z12.11 SCREENING FOR COLON CANCER: ICD-10-CM

## 2022-01-05 PROCEDURE — 99243 OFF/OP CNSLTJ NEW/EST LOW 30: CPT | Performed by: PHYSICIAN ASSISTANT

## 2022-01-05 RX ORDER — HYDROCORTISONE 25 MG/G
CREAM TOPICAL 2 TIMES DAILY
Qty: 28 G | Refills: 3 | Status: SHIPPED | OUTPATIENT
Start: 2022-01-05

## 2022-01-05 NOTE — PATIENT INSTRUCTIONS
Scheduled date of colonoscopy (as of today):2/11/22  Physician performing colonoscopy:Jameson  Location of colonoscopy:[place]  Bowel prep reviewed with patient:miralax/dulcolax  Instructions reviewed with patient by:Leeanna WHITTAKER  Clearances: none

## 2022-01-05 NOTE — PROGRESS NOTES
[facility] - Outpatient Consultation  Neal Done 64 y o  male MRN: [id number]  Encounter: [id number]          ASSESSMENT AND PLAN:      1  External hemorrhoid  Large external hemorrhoid on exam  Prescription for anusol cream provided  Advised increased fiber and water intake  If not shrinking within the next week he will call back    2  Screening for colon cancer  His last colonoscopy was many years ago  He had a negative cologuard 12/2020  Will plan screening exam at this time    ______________________________________________________________________    HPI:  59-year-old male presents for evaluation of a growing lump at the anal opening  He denies that it is painful  He has not had any bleeding or itching  He admits that although his bowel movements are regular he does sit on the toilet for longer than maybe he should while having a bowel movement  He has had issues with hemorrhoids in the past but they always improved with Preparation-H  He has been using preparation H cream over the past few weeks without improvement  He had a negative Cologuard last year  He reports a colonoscopy in the distant past that he believes was normal   He denies any family history of colorectal cancer  REVIEW OF SYSTEMS:    CONSTITUTIONAL: Denies any fever, chills, rigors, and weight loss  HEENT: No earache or tinnitus  Denies hearing loss or visual disturbances  CARDIOVASCULAR: No chest pain or palpitations  RESPIRATORY: Denies any cough, hemoptysis, shortness of breath or dyspnea on exertion  GASTROINTESTINAL: As noted in the History of Present Illness  GENITOURINARY: No problems with urination  Denies any hematuria or dysuria  NEUROLOGIC: No dizziness or vertigo, denies headaches  MUSCULOSKELETAL: Denies any muscle or joint pain  SKIN: Denies skin rashes or itching  ENDOCRINE: Denies excessive thirst  Denies intolerance to heat or cold    PSYCHOSOCIAL: Denies depression or anxiety  Denies any recent memory loss  Historical Information   Past Medical History:   Diagnosis Date    Benign localized prostatic hyperplasia with lower urinary tract symptoms (LUTS)     Last Assessed:8/25/2015    Hypogonadism in male     last assessed:8/25/2015     Past Surgical History:   Procedure Laterality Date    NOSE SURGERY  2020    VASECTOMY      Last Assessed:4/7/2015     Social History   Social History     Substance and Sexual Activity   Alcohol Use No     Social History     Substance and Sexual Activity   Drug Use Never     Social History     Tobacco Use   Smoking Status Former Smoker   Smokeless Tobacco Never Used     Family History   Problem Relation Age of Onset    Glaucoma Mother     Substance Abuse Neg Hx     Mental illness Neg Hx        Meds/Allergies       Current Outpatient Medications:     cetirizine (ZyrTEC) 10 mg tablet    loratadine (Claritin) 10 mg tablet    PARoxetine (PAXIL) 30 mg tablet    tamsulosin (FLOMAX) 0 4 mg    hydrocortisone (ANUSOL-HC) 2 5 % rectal cream    Allergies   Allergen Reactions    Meperidine     Quinolones     Sulfamethoxazole-Trimethoprim            Objective     Blood pressure 122/80, height 5' 8" (1 727 m), weight 97 5 kg (215 lb)  Body mass index is 32 69 kg/m²  PHYSICAL EXAM:      General Appearance:   Alert, cooperative, no distress   HEENT:   Normocephalic, atraumatic, anicteric      Neck:  Supple, symmetrical, trachea midline   Lungs:   Clear to auscultation bilaterally; no rales, rhonchi or wheezing; respirations unlabored    Heart[de-identified]   Regular rate and rhythm; no murmur, rub, or gallop     Abdomen:   Soft, non-tender, non-distended; normal bowel sounds; no masses, no organomegaly    Genitalia:   Deferred    Rectal:   Deferred    Extremities:  No cyanosis, clubbing or edema    Pulses:  2+ and symmetric    Skin:  No jaundice, rashes, or lesions    Lymph nodes:  No palpable cervical lymphadenopathy        Lab Results:   No visits with results within 1 Day(s) from this visit  Latest known visit with results is:   Appointment on 11/27/2021   Component Date Value    Sodium 11/27/2021 137     Potassium 11/27/2021 4 2     Chloride 11/27/2021 105     CO2 11/27/2021 29     ANION GAP 11/27/2021 3*    BUN 11/27/2021 12     Creatinine 11/27/2021 0 95     Glucose, Fasting 11/27/2021 96     Calcium 11/27/2021 9 3     AST 11/27/2021 21     ALT 11/27/2021 31     Alkaline Phosphatase 11/27/2021 86     Total Protein 11/27/2021 7 7     Albumin 11/27/2021 4 0     Total Bilirubin 11/27/2021 0 49     eGFR 11/27/2021 89     WBC 11/27/2021 5 62     RBC 11/27/2021 4 33     Hemoglobin 11/27/2021 14 4     Hematocrit 11/27/2021 43 4     MCV 11/27/2021 100*    MCH 11/27/2021 33 3     MCHC 11/27/2021 33 2     RDW 11/27/2021 12 3     MPV 11/27/2021 9 1     Platelets 16/39/9435 324     nRBC 11/27/2021 0     Neutrophils Relative 11/27/2021 63     Immat GRANS % 11/27/2021 0     Lymphocytes Relative 11/27/2021 24     Monocytes Relative 11/27/2021 8     Eosinophils Relative 11/27/2021 4     Basophils Relative 11/27/2021 1     Neutrophils Absolute 11/27/2021 3 49     Immature Grans Absolute 11/27/2021 0 02     Lymphocytes Absolute 11/27/2021 1 35     Monocytes Absolute 11/27/2021 0 47     Eosinophils Absolute 11/27/2021 0 23     Basophils Absolute 11/27/2021 0 06     Cholesterol 11/27/2021 259*    Triglycerides 11/27/2021 127     HDL, Direct 11/27/2021 56     LDL Calculated 11/27/2021 178*         Radiology Results:   No results found

## 2022-01-06 ENCOUNTER — TELEMEDICINE (OUTPATIENT)
Dept: FAMILY MEDICINE CLINIC | Facility: CLINIC | Age: 57
End: 2022-01-06
Payer: COMMERCIAL

## 2022-01-06 DIAGNOSIS — J32.9 RECURRENT SINUSITIS: Primary | ICD-10-CM

## 2022-01-06 PROCEDURE — 99213 OFFICE O/P EST LOW 20 MIN: CPT | Performed by: PHYSICIAN ASSISTANT

## 2022-01-06 RX ORDER — AMOXICILLIN AND CLAVULANATE POTASSIUM 875; 125 MG/1; MG/1
1 TABLET, FILM COATED ORAL EVERY 12 HOURS SCHEDULED
Qty: 14 TABLET | Refills: 0 | Status: SHIPPED | OUTPATIENT
Start: 2022-01-06 | End: 2022-01-13

## 2022-01-06 RX ORDER — METHYLPREDNISOLONE 4 MG/1
TABLET ORAL
Qty: 21 EACH | Refills: 0 | Status: SHIPPED | OUTPATIENT
Start: 2022-01-06 | End: 2022-03-24 | Stop reason: ALTCHOICE

## 2022-01-06 NOTE — PROGRESS NOTES
Virtual Regular Visit    Verification of patient location:    Patient is located in the following state in which I hold an active license [place]      Assessment/Plan:    Problem List Items Addressed This Visit     None      Visit Diagnoses     Recurrent sinusitis    -  Primary    Relevant Medications    amoxicillin-clavulanate (Augmentin) 875-125 mg per tablet    methylPREDNISolone 4 MG tablet therapy pack      > 1 week of sx, shares he had a negative rapid, defers offered covid test  1 week augmentin  Medrol  Antihistamine  Follow up prn          Reason for visit is   Chief Complaint   Patient presents with    Virtual Regular Visit        Encounter provider Candi Hummel PA-C    Provider located at [place] [address]  [address] [place] [id number]      Recent Visits  No visits were found meeting these conditions  Showing recent visits within past 7 days and meeting all other requirements  Future Appointments  No visits were found meeting these conditions  Showing future appointments within next 150 days and meeting all other requirements       The patient was identified by name and date of birth  Alirio Wilosn was informed that this is a telemedicine visit and that the visit is being conducted through Telephone  My office door was closed  No one else was in the room  He acknowledged consent and understanding of privacy and security of the video platform  The patient has agreed to participate and understands they can discontinue the visit at any time  Patient is aware this is a billable service  Subjective  Alirio Wilson is a 64 y o  male    Pt presents virtually with concerns of > 1 week of sinus and facial pain and nasal congestion  Hx of chronic and recurrent sinusitis  He denies fevers, cough, sore throat, SOB  He shares he took a rapid covid test and it was negative  He is vaccinated, due for booster  + HA   Pt shares he had "his entire nose reconstructed" and "the nasal passages and turbinates are blocked"  Cannot use flonase because this gives him infections, per pt  Past Medical History:   Diagnosis Date    Benign localized prostatic hyperplasia with lower urinary tract symptoms (LUTS)     Last Assessed:8/25/2015    Hypogonadism in male     last assessed:8/25/2015       Past Surgical History:   Procedure Laterality Date    NOSE SURGERY  2020    VASECTOMY      Last Assessed:4/7/2015       Current Outpatient Medications   Medication Sig Dispense Refill    amoxicillin-clavulanate (Augmentin) 875-125 mg per tablet Take 1 tablet by mouth every 12 (twelve) hours for 7 days 14 tablet 0    cetirizine (ZyrTEC) 10 mg tablet Take 10 mg by mouth daily      hydrocortisone (ANUSOL-HC) 2 5 % rectal cream Apply topically 2 (two) times a day 28 g 3    loratadine (Claritin) 10 mg tablet Take 10 mg by mouth daily      methylPREDNISolone 4 MG tablet therapy pack Use as directed on package 21 each 0    PARoxetine (PAXIL) 30 mg tablet Take 2 tablets (60 mg total) by mouth daily 180 tablet 2    tamsulosin (FLOMAX) 0 4 mg TAKE 1 CAPSULE DAILY WITH DINNER 90 capsule 3     No current facility-administered medications for this visit  Allergies   Allergen Reactions    Meperidine     Quinolones     Sulfamethoxazole-Trimethoprim        Review of Systems   Constitutional: Negative for chills, fatigue and fever  HENT: Positive for congestion, postnasal drip, sinus pressure and sinus pain  Negative for ear pain, hearing loss, nosebleeds, rhinorrhea, sneezing and sore throat  Eyes: Negative for pain, discharge, itching and visual disturbance  Respiratory: Negative for cough, chest tightness, shortness of breath and wheezing  Cardiovascular: Negative for chest pain, palpitations and leg swelling  Gastrointestinal: Negative for abdominal pain, blood in stool, constipation, diarrhea, nausea and vomiting     Genitourinary: Negative for frequency and urgency  Neurological: Positive for headaches  Negative for dizziness, light-headedness and numbness  Video Exam    There were no vitals filed for this visit  Physical Exam  Vitals and nursing note reviewed  Constitutional:       General: He is not in acute distress  HENT:      Nose: Congestion present  Pulmonary:      [place]: Pulmonary effort is normal  No respiratory distress  Neurological:      Mental Status: He is alert and oriented to person, place, and time  I spent 10 minutes directly with the patient during this visit    Jhoana verbally agrees to participate in [employer]  Pt is aware that [employer] could be limited without vital signs or the ability to perform a full hands-on physical Dannielle Gottron understands he or the provider may request at any time to terminate the video visit and request the patient to seek care or treatment in person

## 2022-01-21 ENCOUNTER — TELEPHONE (OUTPATIENT)
Dept: GASTROENTEROLOGY | Facility: CLINIC | Age: 57
End: 2022-01-21

## 2022-01-21 NOTE — TELEPHONE ENCOUNTER
Chuckie Moran pt  Received call from Rick Hoang she was returning a call regarding rescheduling procedure for 2/11/22

## 2022-01-25 NOTE — TELEPHONE ENCOUNTER
Mack Marques patient - Patient called again wanting to cancel procedure (2/11/22) and reschedule for   [phone]   Second time she called    [facility]

## 2022-01-28 ENCOUNTER — TELEPHONE (OUTPATIENT)
Dept: GASTROENTEROLOGY | Facility: CLINIC | Age: 57
End: 2022-01-28

## 2022-01-28 NOTE — TELEPHONE ENCOUNTER
Jameson patient - Patient LMOM that she wanted to be sure patient's procedure was cancelled   RTRN'd call to confirm procedure was cancelled 1/25 by     Ovidio

## 2022-03-18 DIAGNOSIS — F41.9 ANXIETY: ICD-10-CM

## 2022-03-18 RX ORDER — PAROXETINE 30 MG/1
TABLET, FILM COATED ORAL
Qty: 180 TABLET | Refills: 3 | Status: SHIPPED | OUTPATIENT
Start: 2022-03-18

## 2022-03-24 ENCOUNTER — TELEPHONE (OUTPATIENT)
Dept: FAMILY MEDICINE CLINIC | Facility: CLINIC | Age: 57
End: 2022-03-24

## 2022-03-24 DIAGNOSIS — N41.0 ACUTE PROSTATITIS: Primary | ICD-10-CM

## 2022-03-24 DIAGNOSIS — N41.0 ACUTE PROSTATITIS: ICD-10-CM

## 2022-03-24 RX ORDER — HYDROCODONE BITARTRATE AND ACETAMINOPHEN 5; 325 MG/1; MG/1
1 TABLET ORAL EVERY 6 HOURS PRN
Qty: 40 TABLET | Refills: 0 | Status: SHIPPED | OUTPATIENT
Start: 2022-03-24 | End: 2022-04-03

## 2022-03-24 RX ORDER — HYDROCODONE BITARTRATE AND ACETAMINOPHEN 5; 325 MG/1; MG/1
1 TABLET ORAL EVERY 6 HOURS PRN
Qty: 40 TABLET | Refills: 0 | Status: SHIPPED | OUTPATIENT
Start: 2022-03-24 | End: 2022-03-24 | Stop reason: SDUPTHER

## 2022-03-24 RX ORDER — DOXYCYCLINE HYCLATE 100 MG/1
100 CAPSULE ORAL EVERY 12 HOURS SCHEDULED
Qty: 28 CAPSULE | Refills: 0 | Status: SHIPPED | OUTPATIENT
Start: 2022-03-24 | End: 2022-04-07

## 2022-03-24 NOTE — TELEPHONE ENCOUNTER
Pt is experiencing another episode of acute prostatitis and is requesting medication be sent to [facility]  He states in the past you have sent Doxy and pain meds  Thank you!

## 2022-03-27 NOTE — PROGRESS NOTES
BMI Counseling: Body mass index is 32 66 kg/m²  The BMI is above normal  Nutrition recommendations include 3-5 servings of fruits/vegetables daily  Exercise recommendations include exercising 3-5 times per week  ADULT ANNUAL [facility]    NAME: Rebecca Hoover  AGE: 47 y o  SEX: male  : 1965     DATE: 2019     Assessment and Plan:     Problem List Items Addressed This Visit        Musculoskeletal and Integument    Atopic dermatitis    Relevant Orders    Ambulatory referral to Dermatology       Other    Screening for diabetes mellitus - Primary    Relevant Orders    Glucose, fasting    Need for hepatitis C screening test    Relevant Orders    Hepatitis C antibody          Immunizations and preventive care screenings were discussed with patient today  Appropriate education was printed on patient's after visit summary  Counseling:  · Exercise: the importance of regular exercise/physical activity was discussed  Recommend exercise 3-5 times per week for at least 30 minutes  Return in 1 year (on 2020)  Chief Complaint:     Chief Complaint   Patient presents with    Physical Exam     work insurance wellness physical     Cold Like Symptoms     congestion and cough       History of Present Illness:     Adult Annual Physical   Patient here for a comprehensive physical exam  Patient is having sinus congestion pain and pressure  Patient has upcoming sinus surgery has to have a nasal reconstruction for a hole in the septum  Patient feeling like his congestion is different from his usual  Patient also has problems with his skin and had seen dermatology in the past and was told that he had atopic dermatitis  Patient had seen dermatology in the past and was given different topicals  Patient has redness on the anterior shin and both sides present for a while   Diet and Physical Activity  · Diet/Nutrition: well balanced diet  Lab Results   Component Value Date    HGBA1C 5 7 (H) 11/07/2021     Recent Labs     03/26/22  1637 03/26/22  2124 03/26/22  2342 03/27/22  0717   POCGLU 397* 362* 264* 186*     · With very good control as evidence by A1c as above on metformin as an OP   Metformin on hold  · With hyperglycemia, likely exacerbated by acute illness and steroid use  · Restart home regimen · Exercise: moderate cardiovascular exercise  Depression Screening  PHQ-9 Depression Screening    PHQ-9:    Frequency of the following problems over the past two weeks:            General Health  · Sleep: sleeps well  · Hearing: normal - bilateral   · Vision: goes for regular eye exams and wears glasses  · Dental: regular dental visits  [facility]  · Symptoms include: none     Review of Systems:     Review of Systems   Constitutional: Negative for activity change, appetite change, chills, diaphoresis, fatigue, fever and unexpected weight change  HENT: Positive for congestion, postnasal drip, rhinorrhea and sinus pain  Negative for ear pain, hearing loss, sinus pressure, sneezing and sore throat  Eyes: Negative for pain, redness and visual disturbance  Respiratory: Negative for cough and shortness of breath  Cardiovascular: Negative for chest pain and leg swelling  Gastrointestinal: Negative for abdominal pain, diarrhea, nausea and vomiting  Musculoskeletal: Negative for arthralgias  Skin: Positive for rash  Neurological: Negative for dizziness and light-headedness  Psychiatric/Behavioral: Negative for behavioral problems and dysphoric mood        Past Medical History:     Past Medical History:   Diagnosis Date    Benign localized prostatic hyperplasia with lower urinary tract symptoms (LUTS)     Last Assessed:8/25/2015    Hypogonadism in male     last assessed:8/25/2015      Past Surgical History:     Past Surgical History:   Procedure Laterality Date    VASECTOMY      Last Assessed:4/7/2015      Family History:     Family History   Problem Relation Age of Onset    Glaucoma Mother     Substance Abuse Neg Hx     Mental illness Neg Hx       Social History:     Social History     Socioeconomic History    Marital status: /Civil Union     Spouse name: None    Number of children: None    Years of education: None    Highest education level: None   Occupational History    None   Social Needs    Financial resource strain: None    Food insecurity:     Worry: None     Inability: None    Transportation needs:     Medical: None     Non-medical: None   Tobacco Use    Smoking status: Former Smoker    Smokeless tobacco: Never Used   Substance and Sexual Activity    Alcohol use: No    Drug use: None    Sexual activity: None   Lifestyle    Physical activity:     Days per week: None     Minutes per session: None    Stress: None   Relationships    Social connections:     Talks on phone: None     Gets together: None     Attends Religion service: None     Active member of club or organization: None     Attends meetings of clubs or organizations: None     Relationship status: None    Intimate partner violence:     Fear of current or ex partner: None     Emotionally abused: None     Physically abused: None     Forced sexual activity: None   Other Topics Concern    None   Social History Narrative    Caffeine use      Current Medications:     Current Outpatient Medications   Medication Sig Dispense Refill    PARoxetine (PAXIL) 30 mg tablet Take 2 tablets (60 mg total) by mouth daily 180 tablet 3     No current facility-administered medications for this visit  Allergies: Allergies   Allergen Reactions    Meperidine     Quinolones     Sulfamethoxazole-Trimethoprim       Physical Exam:     /72 (BP Location: Left arm, Patient Position: Sitting, Cuff Size: Adult)   Pulse 88   Temp 98 7 °F (37 1 °C) (Tympanic)   Resp 16   Ht 5' 8" (1 727 m)   Wt 97 4 kg (214 lb 12 8 oz)   SpO2 97%   BMI 32 66 kg/m²     Physical Exam   Constitutional: He is oriented to person, place, and time  Vital signs are normal  He appears well-developed and well-nourished  No distress  HENT:   Head: Normocephalic and atraumatic  Eyes: Pupils are equal, round, and reactive to light  Neck: Normal range of motion  No thyromegaly present     Cardiovascular: Normal rate, regular rhythm, normal heart sounds and intact distal pulses  No murmur heard  Pulmonary/Chest: Effort normal and breath sounds normal  No respiratory distress  He has no wheezes  Abdominal: Soft  Bowel sounds are normal    Musculoskeletal: Normal range of motion  Neurological: He is alert and oriented to person, place, and time  Skin: Skin is warm and dry  Psychiatric: He has a normal mood and affect  Nursing note and vitals reviewed        Lane Herman, [address]

## 2022-11-15 ENCOUNTER — OFFICE VISIT (OUTPATIENT)
Dept: FAMILY MEDICINE CLINIC | Facility: CLINIC | Age: 57
End: 2022-11-15

## 2022-11-15 VITALS
SYSTOLIC BLOOD PRESSURE: 122 MMHG | HEART RATE: 77 BPM | TEMPERATURE: 98.8 F | DIASTOLIC BLOOD PRESSURE: 84 MMHG | WEIGHT: 223 LBS | BODY MASS INDEX: 33.8 KG/M2 | HEIGHT: 68 IN | OXYGEN SATURATION: 98 %

## 2022-11-15 DIAGNOSIS — K64.8 INTERNAL HEMORRHOID: ICD-10-CM

## 2022-11-15 DIAGNOSIS — J32.0 CHRONIC MAXILLARY SINUSITIS: ICD-10-CM

## 2022-11-15 DIAGNOSIS — N40.1 BPH WITH OBSTRUCTION/LOWER URINARY TRACT SYMPTOMS: ICD-10-CM

## 2022-11-15 DIAGNOSIS — Z12.5 SCREENING FOR PROSTATE CANCER: ICD-10-CM

## 2022-11-15 DIAGNOSIS — Z23 NEED FOR INFLUENZA VACCINATION: ICD-10-CM

## 2022-11-15 DIAGNOSIS — E78.2 MIXED HYPERLIPIDEMIA: ICD-10-CM

## 2022-11-15 DIAGNOSIS — N13.8 BPH WITH OBSTRUCTION/LOWER URINARY TRACT SYMPTOMS: ICD-10-CM

## 2022-11-15 DIAGNOSIS — Z00.00 ANNUAL PHYSICAL EXAM: Primary | ICD-10-CM

## 2022-11-15 PROBLEM — U07.1 COVID-19: Status: RESOLVED | Noted: 2020-12-08 | Resolved: 2022-11-15

## 2022-11-15 PROBLEM — N41.0 ACUTE PROSTATITIS: Status: RESOLVED | Noted: 2021-05-04 | Resolved: 2022-11-15

## 2022-11-15 RX ORDER — PREDNISONE 20 MG/1
TABLET ORAL
Qty: 10 TABLET | Refills: 0 | Status: SHIPPED | OUTPATIENT
Start: 2022-11-15 | End: 2022-11-24

## 2022-11-15 RX ORDER — AMOXICILLIN AND CLAVULANATE POTASSIUM 875; 125 MG/1; MG/1
1 TABLET, FILM COATED ORAL EVERY 12 HOURS SCHEDULED
Qty: 20 TABLET | Refills: 0 | Status: SHIPPED | OUTPATIENT
Start: 2022-11-15 | End: 2022-11-25

## 2022-11-15 NOTE — PATIENT INSTRUCTIONS

## 2022-11-15 NOTE — PROGRESS NOTES
ADULT ANNUAL [facility]    NAME: Lilli Ambrose  AGE: 62 y o  SEX: male  : 1965     DATE: 11/15/2022     Assessment and Plan:     Problem List Items Addressed This Visit        Digestive    Internal hemorrhoid     Will refer to surgery for evaluation          Relevant Orders    Ambulatory Referral to General Surgery       Respiratory    Chronic maxillary sinusitis    Relevant Medications    amoxicillin-clavulanate (AUGMENTIN) 875-125 mg per tablet    predniSONE 20 mg tablet       Genitourinary    BPH with obstruction/lower urinary tract symptoms    Relevant Orders    Comprehensive metabolic panel    CBC and differential       Other    Annual physical exam - Primary    Mixed hyperlipidemia    Relevant Orders    Comprehensive metabolic panel    Lipid Panel with Direct LDL reflex    BMI 33 0-33 9,adult    Screening for prostate cancer    Relevant Orders    PSA, Total Screen          Immunizations and preventive care screenings were discussed with patient today  Appropriate education was printed on patient's after visit summary  Counseling:  Dental Health: discussed importance of regular tooth brushing, flossing, and dental visits  Exercise: the importance of regular exercise/physical activity was discussed  Recommend exercise 3-5 times per week for at least 30 minutes  BMI Counseling: Body mass index is 33 91 kg/m²  The BMI is above normal  Nutrition recommendations include decreasing portion sizes, encouraging healthy choices of fruits and vegetables, decreasing fast food intake, consuming healthier snacks, limiting drinks that contain sugar, moderation in carbohydrate intake, increasing intake of lean protein, reducing intake of saturated and trans fat and reducing intake of cholesterol  Exercise recommendations include moderate physical activity 150 minutes/week  No pharmacotherapy was ordered  Patient referred to PCP   Rationale for BMI follow-up plan is due to patient being overweight or obese  Depression Screening and Follow-up Plan: Patient was screened for depression during today's encounter  They screened negative with a PHQ-2 score of 0  Return in 1 year (on 11/15/2023)  Chief Complaint:     Chief Complaint   Patient presents with   • Annual Exam      History of Present Illness:     Adult Annual Physical   Patient here for a comprehensive physical exam  The patient reports problems - sinus infection   Diet and Physical Activity  Diet/Nutrition: well balanced diet  Exercise: no formal exercise  Depression Screening  PHQ-2/9 Depression Screening    Little interest or pleasure in doing things: 0 - not at all  Feeling down, depressed, or hopeless: 0 - not at all  PHQ-2 Score: 0  PHQ-2 Interpretation: Negative depression screen       General Health  Sleep: sleeps well and gets 4-6 hours of sleep on average  Hearing: normal - bilateral   Vision: no vision problems, goes for regular eye exams, most recent eye exam <1 year ago and wears glasses  Dental: regular dental visits, brushes teeth twice daily and flosses teeth occasionally   Health  Symptoms include: weak urinary stream     Review of Systems:     Review of Systems   Constitutional: Negative for activity change, appetite change, chills, diaphoresis, fatigue, fever and unexpected weight change  HENT: Positive for congestion, postnasal drip, sinus pressure and sinus pain  Negative for ear pain, hearing loss, sneezing and sore throat  Eyes: Negative for pain, redness and visual disturbance  Respiratory: Negative for apnea, cough and shortness of breath  Cardiovascular: Negative for chest pain and leg swelling  Gastrointestinal: Negative for abdominal pain, diarrhea, nausea and vomiting  Hemorrhoids    Endocrine: Negative  Genitourinary: Negative  Musculoskeletal: Negative for arthralgias  Skin: Negative  Allergic/Immunologic: Negative  Neurological: Positive for headaches  Negative for dizziness, tremors, seizures, syncope, facial asymmetry, speech difficulty, weakness, light-headedness and numbness  Hematological: Negative  Psychiatric/Behavioral: Negative for behavioral problems and dysphoric mood        Past Medical History:     Past Medical History:   Diagnosis Date   • Benign localized prostatic hyperplasia with lower urinary tract symptoms (LUTS)     Last Assessed:8/25/2015   • Hypogonadism in male     last assessed:8/25/2015      Past Surgical History:     Past Surgical History:   Procedure Laterality Date   • NOSE SURGERY  2020   • VASECTOMY      Last Assessed:4/7/2015      Family History:     Family History   Problem Relation Age of Onset   • Glaucoma Mother    • Substance Abuse Neg Hx    • Mental illness Neg Hx       Social History:     Social History     Socioeconomic History   • Marital status: /Civil Union     Spouse name: Not on file   • Number of children: Not on file   • Years of education: Not on file   • Highest education level: Not on file   Occupational History   • Not on file   Tobacco Use   • Smoking status: Former Smoker   • Smokeless tobacco: Never Used   Vaping Use   • Vaping Use: Never used   Substance and Sexual Activity   • Alcohol use: No   • Drug use: Never   • Sexual activity: Not on file   Other Topics Concern   • Not on file   Social History Narrative    Caffeine use     Social Determinants of Health     Financial Resource Strain: Not on file   Food Insecurity: Not on file   Transportation Needs: Not on file   Physical Activity: Not on file   Stress: Not on file   Social Connections: Not on file   Intimate Partner Violence: Not on file   Housing Stability: Not on file      Current Medications:     Current Outpatient Medications   Medication Sig Dispense Refill   • amoxicillin-clavulanate (AUGMENTIN) 875-125 mg per tablet Take 1 tablet by mouth every 12 (twelve) hours for 10 days 20 tablet 0   • cetirizine (ZyrTEC) 10 mg tablet Take 10 mg by mouth daily     • hydrocortisone (ANUSOL-HC) 2 5 % rectal cream Apply topically 2 (two) times a day 28 g 3   • loratadine (CLARITIN) 10 mg tablet Take 10 mg by mouth daily     • PARoxetine (PAXIL) 30 mg tablet TAKE 2 TABLETS DAILY 180 tablet 3   • predniSONE 20 mg tablet Take 1 tablet (20 mg total) by mouth 3 (three) times a day with meals for 3 days, THEN 1 tablet (20 mg total) 2 (two) times a day with meals for 3 days, THEN 1 tablet (20 mg total) daily for 3 days  10 tablet 0     No current facility-administered medications for this visit  Allergies: Allergies   Allergen Reactions   • Meperidine    • Quinolones    • Sulfamethoxazole-Trimethoprim       Physical Exam:     /84   Pulse 77   Temp 98 8 °F (37 1 °C)   Ht 5' 8" (1 727 m)   Wt 101 kg (223 lb)   SpO2 98%   BMI 33 91 kg/m²     Physical Exam  Vitals and nursing note reviewed  Constitutional:       Appearance: He is well-developed  HENT:      Head: Normocephalic and atraumatic  Right Ear: Hearing and tympanic membrane normal       Left Ear: Hearing and tympanic membrane normal       Nose: Mucosal edema, congestion and rhinorrhea present  Right Turbinates: Enlarged  Left Turbinates: Enlarged  Right Sinus: Maxillary sinus tenderness present  Left Sinus: Maxillary sinus tenderness present  Mouth/Throat:      Mouth: Mucous membranes are moist       Pharynx: Posterior oropharyngeal erythema present  Eyes:      Conjunctiva/sclera: Conjunctivae normal    Cardiovascular:      Rate and Rhythm: Normal rate and regular rhythm  Heart sounds: No murmur heard  Pulmonary:      Effort: Pulmonary effort is normal  No respiratory distress  Breath sounds: Normal breath sounds  Abdominal:      Palpations: Abdomen is soft  Tenderness: There is no abdominal tenderness  Musculoskeletal:         General: Normal range of motion  Cervical back: Normal range of motion and neck supple  Skin:     General: Skin is warm and dry  Neurological:      General: No focal deficit present  Mental Status: He is alert and oriented to person, place, and time     Psychiatric:         Mood and Affect: Mood normal           Susy Olson, [address]

## 2022-11-25 ENCOUNTER — APPOINTMENT (OUTPATIENT)
Dept: LAB | Facility: CLINIC | Age: 57
End: 2022-11-25

## 2022-11-25 DIAGNOSIS — N40.1 BPH WITH OBSTRUCTION/LOWER URINARY TRACT SYMPTOMS: ICD-10-CM

## 2022-11-25 DIAGNOSIS — N13.8 BPH WITH OBSTRUCTION/LOWER URINARY TRACT SYMPTOMS: ICD-10-CM

## 2022-11-25 DIAGNOSIS — Z12.5 SCREENING FOR PROSTATE CANCER: ICD-10-CM

## 2022-11-25 DIAGNOSIS — E78.2 MIXED HYPERLIPIDEMIA: ICD-10-CM

## 2022-11-25 LAB
ALBUMIN SERPL BCP-MCNC: 3.9 G/DL (ref 3.5–5)
ALP SERPL-CCNC: 76 U/L (ref 46–116)
ALT SERPL W P-5'-P-CCNC: 30 U/L (ref 12–78)
ANION GAP SERPL CALCULATED.3IONS-SCNC: 4 MMOL/L (ref 4–13)
AST SERPL W P-5'-P-CCNC: 13 U/L (ref 5–45)
BASOPHILS # BLD AUTO: 0.04 THOUSANDS/ÂΜL (ref 0–0.1)
BASOPHILS NFR BLD AUTO: 0 % (ref 0–1)
BILIRUB SERPL-MCNC: 0.51 MG/DL (ref 0.2–1)
BUN SERPL-MCNC: 18 MG/DL (ref 5–25)
CALCIUM SERPL-MCNC: 9.6 MG/DL (ref 8.3–10.1)
CHLORIDE SERPL-SCNC: 104 MMOL/L (ref 96–108)
CHOLEST SERPL-MCNC: 226 MG/DL
CO2 SERPL-SCNC: 30 MMOL/L (ref 21–32)
CREAT SERPL-MCNC: 0.92 MG/DL (ref 0.6–1.3)
EOSINOPHIL # BLD AUTO: 0.08 THOUSAND/ÂΜL (ref 0–0.61)
EOSINOPHIL NFR BLD AUTO: 1 % (ref 0–6)
ERYTHROCYTE [DISTWIDTH] IN BLOOD BY AUTOMATED COUNT: 12.6 % (ref 11.6–15.1)
GFR SERPL CREATININE-BSD FRML MDRD: 91 ML/MIN/1.73SQ M
GLUCOSE P FAST SERPL-MCNC: 87 MG/DL (ref 65–99)
HCT VFR BLD AUTO: 43.8 % (ref 36.5–49.3)
HDLC SERPL-MCNC: 70 MG/DL
HGB BLD-MCNC: 14.4 G/DL (ref 12–17)
IMM GRANULOCYTES # BLD AUTO: 0.06 THOUSAND/UL (ref 0–0.2)
IMM GRANULOCYTES NFR BLD AUTO: 1 % (ref 0–2)
LDLC SERPL CALC-MCNC: 135 MG/DL (ref 0–100)
LYMPHOCYTES # BLD AUTO: 1.66 THOUSANDS/ÂΜL (ref 0.6–4.47)
LYMPHOCYTES NFR BLD AUTO: 17 % (ref 14–44)
MCH RBC QN AUTO: 32.5 PG (ref 26.8–34.3)
MCHC RBC AUTO-ENTMCNC: 32.9 G/DL (ref 31.4–37.4)
MCV RBC AUTO: 99 FL (ref 82–98)
MONOCYTES # BLD AUTO: 0.7 THOUSAND/ÂΜL (ref 0.17–1.22)
MONOCYTES NFR BLD AUTO: 7 % (ref 4–12)
NEUTROPHILS # BLD AUTO: 7.47 THOUSANDS/ÂΜL (ref 1.85–7.62)
NEUTS SEG NFR BLD AUTO: 74 % (ref 43–75)
NRBC BLD AUTO-RTO: 0 /100 WBCS
PLATELET # BLD AUTO: 328 THOUSANDS/UL (ref 149–390)
PMV BLD AUTO: 9.1 FL (ref 8.9–12.7)
POTASSIUM SERPL-SCNC: 4.8 MMOL/L (ref 3.5–5.3)
PROT SERPL-MCNC: 7.2 G/DL (ref 6.4–8.4)
PSA SERPL-MCNC: 0.6 NG/ML (ref 0–4)
RBC # BLD AUTO: 4.43 MILLION/UL (ref 3.88–5.62)
SODIUM SERPL-SCNC: 138 MMOL/L (ref 135–147)
TRIGL SERPL-MCNC: 106 MG/DL
WBC # BLD AUTO: 10.01 THOUSAND/UL (ref 4.31–10.16)

## 2022-12-27 ENCOUNTER — TELEPHONE (OUTPATIENT)
Dept: FAMILY MEDICINE CLINIC | Facility: CLINIC | Age: 57
End: 2022-12-27

## 2022-12-27 DIAGNOSIS — N13.8 BPH WITH OBSTRUCTION/LOWER URINARY TRACT SYMPTOMS: Primary | ICD-10-CM

## 2022-12-27 DIAGNOSIS — N40.1 BPH WITH OBSTRUCTION/LOWER URINARY TRACT SYMPTOMS: Primary | ICD-10-CM

## 2022-12-27 RX ORDER — TAMSULOSIN HYDROCHLORIDE 0.4 MG/1
0.4 CAPSULE ORAL
Qty: 90 CAPSULE | Refills: 3 | Status: SHIPPED | OUTPATIENT
Start: 2022-12-27

## 2022-12-27 NOTE — TELEPHONE ENCOUNTER
Pt called requesting refill on his Tamsulosin 0 4 mg, this is no longer listed on his current list  Please send 90 day supply to [facility] if you want him on this

## 2023-01-14 PROBLEM — Z12.5 SCREENING FOR PROSTATE CANCER: Status: RESOLVED | Noted: 2022-11-15 | Resolved: 2023-01-14

## 2023-02-13 ENCOUNTER — TELEPHONE (OUTPATIENT)
Dept: FAMILY MEDICINE CLINIC | Facility: CLINIC | Age: 58
End: 2023-02-13

## 2023-02-13 DIAGNOSIS — N41.9 PROSTATITIS, UNSPECIFIED PROSTATITIS TYPE: ICD-10-CM

## 2023-02-13 DIAGNOSIS — N40.1 BPH WITH OBSTRUCTION/LOWER URINARY TRACT SYMPTOMS: Primary | ICD-10-CM

## 2023-02-13 DIAGNOSIS — N13.8 BPH WITH OBSTRUCTION/LOWER URINARY TRACT SYMPTOMS: Primary | ICD-10-CM

## 2023-02-13 RX ORDER — HYDROCODONE BITARTRATE AND ACETAMINOPHEN 5; 325 MG/1; MG/1
1 TABLET ORAL EVERY 6 HOURS PRN
Qty: 28 TABLET | Refills: 0 | Status: SHIPPED | OUTPATIENT
Start: 2023-02-13 | End: 2023-02-20

## 2023-02-13 RX ORDER — DOXYCYCLINE HYCLATE 100 MG/1
100 CAPSULE ORAL EVERY 12 HOURS SCHEDULED
Qty: 28 CAPSULE | Refills: 0 | Status: SHIPPED | OUTPATIENT
Start: 2023-02-13 | End: 2023-02-27

## 2023-02-13 NOTE — TELEPHONE ENCOUNTER
Pt calls and reports "Im having a problem with my prostate again"  States that you are aware of his situation  Pt having a lot of discomfort and says that you treated him last time with doxycycline and norco  Denies difficulty with urination-just significant discomfort

## 2023-02-20 DIAGNOSIS — F41.9 ANXIETY: ICD-10-CM

## 2023-02-20 RX ORDER — PAROXETINE 30 MG/1
TABLET, FILM COATED ORAL
Qty: 180 TABLET | Refills: 3 | Status: SHIPPED | OUTPATIENT
Start: 2023-02-20

## 2023-03-27 ENCOUNTER — TELEPHONE (OUTPATIENT)
Dept: FAMILY MEDICINE CLINIC | Facility: CLINIC | Age: 58
End: 2023-03-27

## 2023-03-27 DIAGNOSIS — N41.0 ACUTE PROSTATITIS: Primary | ICD-10-CM

## 2023-03-27 RX ORDER — DOXYCYCLINE HYCLATE 100 MG/1
100 CAPSULE ORAL EVERY 12 HOURS SCHEDULED
Qty: 28 CAPSULE | Refills: 0 | Status: SHIPPED | OUTPATIENT
Start: 2023-03-27 | End: 2023-04-10

## 2023-03-27 NOTE — TELEPHONE ENCOUNTER
Enrique Maxon called saying he was having another issue with his prostate and he is in a great deal of discomfort and he cannot get in with Urology   He was asking for an antibiotic, he stated you are aware of his situation

## 2023-06-19 DIAGNOSIS — N41.1 CHRONIC PROSTATITIS: Primary | ICD-10-CM

## 2023-06-19 RX ORDER — HYDROCODONE BITARTRATE AND ACETAMINOPHEN 5; 325 MG/1; MG/1
1 TABLET ORAL EVERY 6 HOURS PRN
Qty: 28 TABLET | Refills: 0 | Status: SHIPPED | OUTPATIENT
Start: 2023-06-19 | End: 2023-06-26

## 2023-06-19 RX ORDER — DOXYCYCLINE HYCLATE 100 MG/1
100 CAPSULE ORAL EVERY 12 HOURS SCHEDULED
Qty: 28 CAPSULE | Refills: 0 | Status: SHIPPED | OUTPATIENT
Start: 2023-06-19 | End: 2023-07-03

## 2023-07-24 ENCOUNTER — TELEPHONE (OUTPATIENT)
Dept: UROLOGY | Facility: CLINIC | Age: 58
End: 2023-07-24

## 2023-07-24 DIAGNOSIS — N41.1 CHRONIC PROSTATITIS: Primary | ICD-10-CM

## 2023-07-24 DIAGNOSIS — N41.0 ACUTE PROSTATITIS: ICD-10-CM

## 2023-07-24 RX ORDER — IBUPROFEN 800 MG/1
800 TABLET ORAL EVERY 8 HOURS PRN
Qty: 30 TABLET | Refills: 0 | Status: SHIPPED | OUTPATIENT
Start: 2023-07-24 | End: 2023-08-03

## 2023-07-24 RX ORDER — DOXYCYCLINE HYCLATE 100 MG/1
100 CAPSULE ORAL EVERY 12 HOURS SCHEDULED
Qty: 20 CAPSULE | Refills: 0 | Status: SHIPPED | OUTPATIENT
Start: 2023-07-24 | End: 2023-08-03

## 2023-07-24 NOTE — TELEPHONE ENCOUNTER
Previous Dr. Nuzhat Anand patient that wants to be seen in [facility]. He is having pain and swelling in the perineum and scrotum and thinks he has an infection and said that he has had this before and wants to be seen.

## 2023-08-01 ENCOUNTER — APPOINTMENT (OUTPATIENT)
Dept: RADIOLOGY | Facility: CLINIC | Age: 58
End: 2023-08-01
Payer: COMMERCIAL

## 2023-08-01 ENCOUNTER — OFFICE VISIT (OUTPATIENT)
Dept: OBGYN CLINIC | Facility: CLINIC | Age: 58
End: 2023-08-01
Payer: COMMERCIAL

## 2023-08-01 VITALS
WEIGHT: 221.3 LBS | BODY MASS INDEX: 33.54 KG/M2 | HEIGHT: 68 IN | DIASTOLIC BLOOD PRESSURE: 66 MMHG | HEART RATE: 113 BPM | SYSTOLIC BLOOD PRESSURE: 89 MMHG

## 2023-08-01 DIAGNOSIS — M54.12 RADICULOPATHY, CERVICAL REGION: ICD-10-CM

## 2023-08-01 DIAGNOSIS — M25.512 LEFT SHOULDER PAIN, UNSPECIFIED CHRONICITY: ICD-10-CM

## 2023-08-01 DIAGNOSIS — M25.522 PAIN IN LEFT ELBOW: Primary | ICD-10-CM

## 2023-08-01 DIAGNOSIS — M75.32 CALCIFIC TENDINITIS OF LEFT SHOULDER: ICD-10-CM

## 2023-08-01 DIAGNOSIS — M25.522 PAIN IN LEFT ELBOW: ICD-10-CM

## 2023-08-01 DIAGNOSIS — M77.12 LATERAL EPICONDYLITIS OF LEFT ELBOW: ICD-10-CM

## 2023-08-01 PROCEDURE — 99203 OFFICE O/P NEW LOW 30 MIN: CPT | Performed by: ORTHOPAEDIC SURGERY

## 2023-08-01 PROCEDURE — 73030 X-RAY EXAM OF SHOULDER: CPT

## 2023-08-01 PROCEDURE — 73080 X-RAY EXAM OF ELBOW: CPT

## 2023-08-01 RX ORDER — METHYLPREDNISOLONE 4 MG/1
TABLET ORAL
Qty: 21 TABLET | Refills: 0 | Status: SHIPPED | OUTPATIENT
Start: 2023-08-01

## 2023-08-01 NOTE — PROGRESS NOTES
Orthopaedics Office Visit - New Patient Visit    ASSESSMENT/PLAN:    Assessment:   Calcific Tendonitis of Left shoulder  Lateral Epicondylitis of Left elbow  Possible Cervical radiculopathy    Plan:   · X-rays reviewed with pt. Left shoulder Calcific tendonitis noted. Left elbow unremarkable  · Exam findings constant with lateral epicondylitis  · He does exhibit some symptoms of Cervical Radiculopathy  · Medros kassandra prescribed  · Script for Physical Therapy provided  · F/U PRN        _____________________________________________________  CHIEF COMPLAINT:  Chief Complaint   Patient presents with   • Left Elbow - Pain   • Left Shoulder - Pain         SUBJECTIVE:  Modesta Montalvo is a 62 y.o. male who presents for evaluation of Left shoulder and elbow pain. Pt states that he has been having pain in his elbow and shoulder for quite some time. He states he had no injuries or traumas. His shoulder is painful with reaching and over head activities. 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 He wears counter force straps. He wears two on his Left UE. Pt has pain with gripping and pronation/supination. He also reports pain in the 4th and 5th digits. Fingers did lock at times on him.  He reports no traumas or injuries  He also had numbness into his fingers with holding a steering wheel as well as some left side neck pain    PAST MEDICAL HISTORY:  Past Medical History:   Diagnosis Date   • Benign localized prostatic hyperplasia with lower urinary tract symptoms (LUTS)     Last Assessed:8/25/2015   • Hypogonadism in male     last assessed:8/25/2015       PAST SURGICAL HISTORY:  Past Surgical History:   Procedure Laterality Date   • NOSE SURGERY  2020   • VASECTOMY      Last Assessed:4/7/2015       FAMILY HISTORY:  Family History   Problem Relation Age of Onset   • Glaucoma Mother    • Substance Abuse Neg Hx    • Mental illness Neg Hx        SOCIAL HISTORY:  Social History     Tobacco Use   • Smoking status: Former   • Smokeless tobacco: Never   Vaping Use   • Vaping Use: Never used   Substance Use Topics   • Alcohol use: No   • Drug use: Never       MEDICATIONS:    Current Outpatient Medications:   •  cetirizine (ZyrTEC) 10 mg tablet, Take 10 mg by mouth daily, Disp: , Rfl:   •  doxycycline hyclate (VIBRAMYCIN) 100 mg capsule, Take 1 capsule (100 mg total) by mouth every 12 (twelve) hours for 10 days, Disp: 20 capsule, Rfl: 0  •  hydrocortisone (ANUSOL-HC) 2.5 % rectal cream, Apply topically 2 (two) times a day, Disp: 28 g, Rfl: 3  •  ibuprofen (MOTRIN) 800 mg tablet, Take 1 tablet (800 mg total) by mouth every 8 (eight) hours as needed for mild pain for up to 10 days, Disp: 30 tablet, Rfl: 0  •  loratadine (CLARITIN) 10 mg tablet, Take 10 mg by mouth daily, Disp: , Rfl:   •  PARoxetine (PAXIL) 30 mg tablet, TAKE 2 TABLETS DAILY, Disp: 180 tablet, Rfl: 3  •  tamsulosin (FLOMAX) 0.4 mg, Take 1 capsule (0.4 mg total) by mouth daily with dinner, Disp: 90 capsule, Rfl: 3    ALLERGIES:  Allergies   Allergen Reactions   • Meperidine    • Quinolones    • Sulfamethoxazole-Trimethoprim        REVIEW OF SYSTEMS:  MSK: Left shoulder and elbow pain  Neuro: Intact  Pertinent items are otherwise noted in HPI. A comprehensive review of systems was otherwise negative.     LABS:  HgA1c: No results found for: "HGBA1C"  BMP:   Lab Results   Component Value Date    GLUCOSE 97 10/01/2016    CALCIUM 9.6 11/25/2022     10/01/2016    K 4.8 11/25/2022    CO2 30 11/25/2022     11/25/2022    BUN 18 11/25/2022    CREATININE 0.92 11/25/2022     CBC: No components found for: "CBC"    _____________________________________________________  PHYSICAL EXAMINATION:  Vital signs: BP (!) 89/66   Pulse (!) 113   Ht 5' 8" (1.727 m)   Wt 100 kg (221 lb 4.8 oz)   BMI 33.65 kg/m²   General: No acute distress, awake and alert  Psychiatric: Mood and affect appear appropriate  HEENT: Trachea Midline, No torticollis, no apparent facial trauma  Cardiovascular: No audible murmurs; Extremities appear perfused  Pulmonary: No audible wheezing or stridor  Skin: No open lesions; see further details (if any) below    MUSCULOSKELETAL EXAMINATION:  Extremities:     Left Elbow Exam     Tenderness   The patient is experiencing tenderness in the lateral epicondyle. Range of Motion   Extension: 0   Flexion: 120   Pronation: normal   Supination: normal     Other   Erythema: absent  Sensation: normal  Pulse: present      Right Shoulder Exam     Muscle Strength   Abduction: 5/5   Internal rotation: 5/5   External rotation: 5/5   Supraspinatus: 5/5       Left Shoulder Exam     Range of Motion   Active abduction: 160   Forward flexion: 170     Muscle Strength   Abduction: 3/5   Internal rotation: 5/5   External rotation: 4/5   Supraspinatus: 4/5     Other   Erythema: absent  Sensation: normal  Pulse: present                 _____________________________________________________  STUDIES REVIEWED:  I personally reviewed the images and interpretation is as follows:  Left shoulder  No fractures. Calcific tendonitis noted  Left elbow.     PROCEDURES PERFORMED:  Procedures   No procedures performed    Omari Sotelo MD

## 2023-08-15 ENCOUNTER — TELEMEDICINE (OUTPATIENT)
Dept: FAMILY MEDICINE CLINIC | Facility: CLINIC | Age: 58
End: 2023-08-15
Payer: COMMERCIAL

## 2023-08-15 DIAGNOSIS — U07.1 COVID-19: Primary | ICD-10-CM

## 2023-08-15 PROCEDURE — 99214 OFFICE O/P EST MOD 30 MIN: CPT | Performed by: PHYSICIAN ASSISTANT

## 2023-08-15 RX ORDER — NIRMATRELVIR AND RITONAVIR 300-100 MG
3 KIT ORAL 2 TIMES DAILY
Qty: 30 TABLET | Refills: 0 | Status: SHIPPED | OUTPATIENT
Start: 2023-08-15 | End: 2023-08-20

## 2023-08-15 NOTE — PROGRESS NOTES
COVID-19 Outpatient Progress Note    Assessment/Plan:    Problem List Items Addressed This Visit    None  Visit Diagnoses     COVID-19    -  Primary    Relevant Medications    nirmatrelvir & ritonavir (Paxlovid, 300/100,) tablet therapy pack         Disposition:     Patient has asymptomatic or mild COVID-19 infection. Based off CDC guidelines, they were recommended to isolate for 5 days. If they are asymptomatic or symptoms are improving with no fevers in the past 24 hours, isolation may be ended followed by 5 days of wearing a mask when around othes to minimize risk of infecting others. If still have a fever or other symptoms have not improved, continue to isolate until they improve. Regardless of when they end isolation, avoid being around people who are more likely to get very sick from COVID-19 until at least day 11. Discussed symptom directed medication options with patient. Patient meets criteria for PAXLOVID and they have been counseled appropriately according to EUA documentation released by the FDA. After discussion, patient agrees to treatment. Casimer Fuel is an investigational medicine used to treat mild-to-moderate COVID-19 in adults and children (15years of age and older weighing at least 80 pounds (40 kg)) with positive results of direct SARS-CoV-2 viral testing, and who are at high risk for progression to severe COVID-19, including hospitalization or death. PAXLOVID is investigational because it is still being studied. There is limited information about the safety and effectiveness of using PAXLOVID to treat people with mild-to-moderate COVID-19.     The FDA has authorized the emergency use of PAXLOVID for the treatment of mild-tomoderate COVID-19 in adults and children (15years of age and older weighing at least 80 pounds (40 kg)) with a positive test for the virus that causes COVID-19, and who are at high risk for progression to severe COVID-19, including hospitalization or death, under an EUA. What should I tell my healthcare provider before I take PAXLOVID? Tell your healthcare provider if you:  - Have any allergies  - Have liver or kidney disease  - Are pregnant or plan to become pregnant  - Are breastfeeding a child  - Have any serious illnesses    Tell your healthcare provider about all the medicines you take, including prescription and over-the-counter medicines, vitamins, and herbal supplements. Some medicines may interact with PAXLOVID and may cause serious side effects. Keep a list of your medicines to show your healthcare provider and pharmacist when you get a new medicine. You can ask your healthcare provider or pharmacist for a list of medicines that interact with PAXLOVID. Do not start taking a new medicine without telling your healthcare provider. Your healthcare provider can tell you if it is safe to take PAXLOVID with other medicines. Tell your healthcare provider if you are taking combined hormonal contraceptive. PAXLOVID may affect how your birth control pills work. Females who are able to become pregnant should use another effective alternative form of contraception or an additional barrier method of contraception. Talk to your healthcare provider if you have any questions about contraceptive methods that might be right for you. How do I take PAXLOVID? PAXLOVID consists of 2 medicines: nirmatrelvir and ritonavir. - Take 2 pink tablets of nirmatrelvir with 1 white tablet of ritonavir by mouth 2 times each day (in the morning and in the evening) for 5 days. For each dose, take all 3 tablets at the same time. - If you have kidney disease, talk to your healthcare provider. You may need a different dose. - Swallow the tablets whole. Do not chew, break, or crush the tablets  - Take PAXLOVID with or without food. - Do not stop taking PAXLOVID without talking to your healthcare provider, even if you feel better.   - If you miss a dose of PAXLOVID within 8 hours of the time it is usually taken, take it as soon as you remember. If you miss a dose by more than 8 hours, skip the missed dose and take the next dose at your regular time. Do not take 2 doses of PAXLOVID at the same time. - If you take too much PAXLOVID, call your healthcare provider or go to the nearest hospital emergency room right away. - If you are taking a ritonavir- or cobicistat-containing medicine to treat hepatitis C or Human Immunodeficiency Virus (HIV), you should continue to take your medicine as prescribed by your healthcare provider.  - Talk to your healthcare provider if you do not feel better or if you feel worse after 5 days. Who should generally not take PAXLOVID? Do not take PAXLOVID if:  You are allergic to nirmatrelvir, ritonavir, or any of the ingredients in PAXLOVID. You are taking any of the following medicines:  - Alfuzosin  - Pethidine, piroxicam, propoxyphene  - Ranolazine  - Amiodarone, dronedarone, flecainide, propafenone, quinidine  - Colchicine  - Lurasidone, pimozide, clozapine  - Dihydroergotamine, ergotamine, methylergonovine  - Lovastatin, simvastatin  - Sildenafil (Revatio®) for pulmonary arterial hypertension (PAH)  - Triazolam, oral midazolam  - Apalutamide  - Carbamazepine, phenobarbital, phenytoin  - Rifampin  - DeForest’s Wort (hypericum perforatum)    What are the important possible side effects of PAXLOVID? Possible side effects of PAXLOVID are:  - Liver Problems. Tell your healthcare provider right away if you have any of these signs and symptoms of liver problems: loss of appetite, yellowing of your skin and the whites of eyes (jaundice), dark-colored urine, pale colored stools and itchy skin, stomach area (abdominal) pain. - Resistance to HIV Medicines. If you have untreated HIV infection, PAXLOVID may lead to some HIV medicines not working as well in the future.   - Other possible side effects include: altered sense of taste, diarrhea, high blood pressure, or muscle aches    These are not all the possible side effects of PAXLOVID. Not many people have taken PAXLOVID. Serious and unexpected side effects may happen. Irasema Castellon is still being studied, so it is possible that all of the risks are not known at this time. What other treatment choices are there? Like Erin Ala may allow for the emergency use of other medicines to treat people with COVID-19. Go to https://[employer]/ for information on the emergency use of other medicines that are authorized by FDA to treat people with COVID-19. Your healthcare provider may talk with you about clinical trials for which you may be eligible. It is your choice to be treated or not to be treated with PAXLOVID. Should you decide not to receive it or for your child not to receive it, it will not change your standard medical care. What if I am pregnant or breastfeeding? There is no experience treating pregnant women or breastfeeding mothers with PAXLOVID. For a mother and unborn baby, the benefit of taking PAXLOVID may be greater than the risk from the treatment. If you are pregnant, discuss your options and specific situation with your healthcare provider. It is recommended that you use effective barrier contraception or do not have sexual activity while taking PAXLOVID. If you are breastfeeding, discuss your options and specific situation with your healthcare provider. How do I report side effects with PAXLOVID? Contact your healthcare provider if you have any side effects that bother you or do not go away. Report side effects to FDA MedWatch at www.fda.gov/medwatch or call 9-793-BAD7788 or you can report side effects to [facility]. at the contact information provided below. Website Fax number Telephone number   [employer] 2-926.563.5229 [phone]     How should I store PAXLOVID? Store PAXLOVID tablets at room temperature between 68°F to 77°F (20°C to 25°C). Full fact sheet for patients, parents, and caregivers can be found at: [employer]co.za    I have spent a total time of 15 minutes on the day of the encounter for this patient including       Encounter provider: Gemma Underwood PA-C     Provider located at: [address] [place] [phone]     Recent Visits  No visits were found meeting these conditions. Showing recent visits within past 7 days and meeting all other requirements  Today's Visits  Date Type Provider Dept   08/15/23 Telemedicine Sarath Ortiz PA-C [facility]   Showing today's visits and meeting all other requirements  Future Appointments  No visits were found meeting these conditions. Showing future appointments within next 150 days and meeting all other requirements     This virtual check-in was done via [employer] and patient was informed that this is a secure, HIPAA-compliant platform. He agrees to proceed. Patient agrees to participate in a virtual check in via telephone or video visit instead of presenting to the office to address urgent/immediate medical needs. Patient is aware this is a billable service. He acknowledged consent and understanding of privacy and security of the video platform. The patient has agreed to participate and understands they can discontinue the visit at any time. After connecting through [facility], the patient was identified by name and date of birth. David Nails was informed that this was a telemedicine visit and that the exam was being conducted confidentially over secure lines. David Nails acknowledged consent and understanding of privacy and security of the telemedicine visit.  I informed the patient that I have reviewed his record in Epic and presented the opportunity for him to ask any questions regarding the visit today. The patient agreed to participate. Verification of patient location:  Patient is located in the following state in which I hold an active license: [place]    Subjective:   Darwin Gallo is a 62 y.o. male who has been screened for COVID-19. Symptom change since last report: unchanged. Patient's symptoms include fever, chills, fatigue, malaise, nasal congestion, sore throat, cough, myalgias and headache. Patient denies rhinorrhea, anosmia, loss of taste, shortness of breath, chest tightness, abdominal pain, nausea, vomiting and diarrhea. - Date of symptom onset: 8/12/2023      COVID-19 vaccination status: Fully vaccinated (primary series)    Jose Crandall has been staying home and has isolated themselves in his home. He is taking care to not share personal items and is cleaning all surfaces that are touched often, like counters, tabletops, and doorknobs using household cleaning sprays or wipes. He is wearing a mask when he leaves his room. Lab Results   Component Value Date    SARSCOV2 Inconclusive 12/16/2020       Review of Systems   Constitutional: Positive for chills, fatigue and fever. HENT: Positive for congestion and sore throat. Negative for rhinorrhea. Respiratory: Positive for cough. Negative for chest tightness and shortness of breath. Gastrointestinal: Negative for abdominal pain, diarrhea, nausea and vomiting. Musculoskeletal: Positive for myalgias. Neurological: Positive for headaches.      Current Outpatient Medications on File Prior to Visit   Medication Sig   • cetirizine (ZyrTEC) 10 mg tablet Take 10 mg by mouth daily   • hydrocortisone (ANUSOL-HC) 2.5 % rectal cream Apply topically 2 (two) times a day   • ibuprofen (MOTRIN) 800 mg tablet Take 1 tablet (800 mg total) by mouth every 8 (eight) hours as needed for mild pain for up to 10 days   • loratadine (CLARITIN) 10 mg tablet Take 10 mg by mouth daily   • methylPREDNISolone 4 MG tablet therapy pack Use as directed on package   • PARoxetine (PAXIL) 30 mg tablet TAKE 2 TABLETS DAILY   • tamsulosin (FLOMAX) 0.4 mg Take 1 capsule (0.4 mg total) by mouth daily with dinner       Objective: There were no vitals taken for this visit. Physical Exam  Vitals and nursing note reviewed. Constitutional:       Appearance: Normal appearance. HENT:      Head: Normocephalic and atraumatic. Pulmonary:      Effort: Pulmonary effort is normal. No respiratory distress. Musculoskeletal:      Cervical back: Normal range of motion. Neurological:      Mental Status: He is alert and oriented to person, place, and time.        Victor Hugo Fulton PA-C

## 2023-08-25 ENCOUNTER — OFFICE VISIT (OUTPATIENT)
Dept: UROLOGY | Facility: CLINIC | Age: 58
End: 2023-08-25
Payer: COMMERCIAL

## 2023-08-25 VITALS
OXYGEN SATURATION: 98 % | WEIGHT: 220 LBS | HEART RATE: 86 BPM | DIASTOLIC BLOOD PRESSURE: 86 MMHG | SYSTOLIC BLOOD PRESSURE: 142 MMHG | BODY MASS INDEX: 33.34 KG/M2 | HEIGHT: 68 IN

## 2023-08-25 DIAGNOSIS — N41.9 PROSTATITIS, UNSPECIFIED PROSTATITIS TYPE: ICD-10-CM

## 2023-08-25 DIAGNOSIS — N39.0 URINARY TRACT INFECTION WITHOUT HEMATURIA, SITE UNSPECIFIED: Primary | ICD-10-CM

## 2023-08-25 LAB
SL AMB  POCT GLUCOSE, UA: NORMAL
SL AMB LEUKOCYTE ESTERASE,UA: NORMAL
SL AMB POCT BILIRUBIN,UA: NORMAL
SL AMB POCT BLOOD,UA: NORMAL
SL AMB POCT CLARITY,UA: CLEAR
SL AMB POCT COLOR,UA: YELLOW
SL AMB POCT KETONES,UA: NORMAL
SL AMB POCT NITRITE,UA: NORMAL
SL AMB POCT PH,UA: 7
SL AMB POCT SPECIFIC GRAVITY,UA: 1
SL AMB POCT URINE PROTEIN: NORMAL
SL AMB POCT UROBILINOGEN: 0.2

## 2023-08-25 PROCEDURE — 99213 OFFICE O/P EST LOW 20 MIN: CPT | Performed by: PHYSICIAN ASSISTANT

## 2023-08-25 PROCEDURE — 81002 URINALYSIS NONAUTO W/O SCOPE: CPT | Performed by: PHYSICIAN ASSISTANT

## 2023-08-25 RX ORDER — DEXTROMETHORPHAN HYDROBROMIDE AND PROMETHAZINE HYDROCHLORIDE 15; 6.25 MG/5ML; MG/5ML
SYRUP ORAL
COMMUNITY
Start: 2023-08-14

## 2023-08-25 RX ORDER — PREDNISONE 20 MG/1
TABLET ORAL
COMMUNITY
Start: 2023-08-14

## 2023-08-25 RX ORDER — DOXYCYCLINE HYCLATE 100 MG/1
100 CAPSULE ORAL EVERY 12 HOURS SCHEDULED
Qty: 28 CAPSULE | Refills: 0 | Status: SHIPPED | OUTPATIENT
Start: 2023-08-25 | End: 2023-09-08

## 2023-08-25 NOTE — PROGRESS NOTES
8/25/2023      Chief Complaint   Patient presents with   • Follow-up         Assessment and Plan    62 y.o. male     1. Recurrent Prostatitis   - UA today negative for nitrites, leukocytes, blood. Will send for culture  - CT pelvis with contrast to rule out prostate abscess due to patient having recurring prostatitis  - Doxycycline sent to pharmacy x 14 days. Allergy to cipro and Bactrim  - Patient taking Paxil and will reach out to PCP regarding pain medication that is able to be taken with Paxil as NSAIDS are contraindicated. Discussed recommendations to stop NSAIDS  - Cystoscopy TRUS for further evaluation (scheduled for 11/21/23)   - Call with any questions or concerns in the meantime  - All questions answered; patient understands and agrees with plan       History of Present Illness  Galdino Ferguson Milan Calhoun is a 62 y.o. male patient with history of recurrent prostatitis here for follow up. Patient with history of recurrent prostatitis. States he has had 3-4 infections in the past year. States he does take 800 mg of ibuprofen at a time. Of note, patient is on Paxil and should not take NSAIDS. States he also recently had COVID. Unsure if he has been having fevers. No pelvic imaging. Continues with perineal pain, troubles with urinating. Review of Systems   Constitutional: Negative for activity change, appetite change, chills and fever. HENT: Negative for congestion and trouble swallowing. Respiratory: Negative for cough and shortness of breath. Cardiovascular: Negative for chest pain, palpitations and leg swelling. Gastrointestinal: Negative for abdominal pain, constipation, diarrhea, nausea and vomiting. Genitourinary: Negative for difficulty urinating, dysuria, flank pain, frequency, hematuria and urgency. Perineal pain   Musculoskeletal: Negative for back pain and gait problem. Skin: Negative for wound. Allergic/Immunologic: Negative for immunocompromised state.    Neurological: Negative for dizziness and syncope. Hematological: Does not bruise/bleed easily. Psychiatric/Behavioral: Negative for confusion. All other systems reviewed and are negative. Vitals  Vitals:    08/25/23 0915   BP: 142/86   Pulse: 86   SpO2: 98%   Weight: 99.8 kg (220 lb)   Height: 5' 8" (1.727 m)       Physical Exam  Constitutional:       General: He is not in acute distress. Appearance: Normal appearance. He is not ill-appearing, toxic-appearing or diaphoretic. HENT:      Head: Normocephalic. Nose: No congestion. Eyes:      General: No scleral icterus. Right eye: No discharge. Left eye: No discharge. Conjunctiva/sclera: Conjunctivae normal.      Pupils: Pupils are equal, round, and reactive to light. Pulmonary:      Effort: Pulmonary effort is normal.   Genitourinary:     Comments: MELBA tender and boggy  Musculoskeletal:      Cervical back: Normal range of motion. Skin:     General: Skin is warm and dry. Coloration: Skin is not jaundiced or pale. Findings: No bruising, erythema, lesion or rash. Neurological:      General: No focal deficit present. Mental Status: He is alert and oriented to person, place, and time. Mental status is at baseline. Gait: Gait normal.   Psychiatric:         Mood and Affect: Mood normal.         Behavior: Behavior normal.         Thought Content:  Thought content normal.         Judgment: Judgment normal.           Past History  Past Medical History:   Diagnosis Date   • Benign localized prostatic hyperplasia with lower urinary tract symptoms (LUTS)     Last Assessed:8/25/2015   • Hypogonadism in male     last assessed:8/25/2015     Social History     Socioeconomic History   • Marital status: /Civil Union     Spouse name: None   • Number of children: None   • Years of education: None   • Highest education level: None   Occupational History   • None   Tobacco Use   • Smoking status: Former     Passive exposure: Past • Smokeless tobacco: Never   Vaping Use   • Vaping Use: Never used   Substance and Sexual Activity   • Alcohol use: No   • Drug use: Never   • Sexual activity: None   Other Topics Concern   • None   Social History Narrative    Caffeine use     Social Determinants of Health     Financial Resource Strain: Not on file   Food Insecurity: Not on file   Transportation Needs: Not on file   Physical Activity: Not on file   Stress: Not on file   Social Connections: Not on file   Intimate Partner Violence: Not on file   Housing Stability: Not on file     Social History     Tobacco Use   Smoking Status Former   • Passive exposure: Past   Smokeless Tobacco Never     Family History   Problem Relation Age of Onset   • Glaucoma Mother    • Substance Abuse Neg Hx    • Mental illness Neg Hx        The following portions of the patient's history were reviewed and updated as appropriate: allergies, current medications, past medical history, past social history, past surgical history and problem list.    Results  Recent Results (from the past 1 hour(s))   POCT urine dip    Collection Time: 08/25/23  9:21 AM   Result Value Ref Range    LEUKOCYTE ESTERASE,UA -     NITRITE,UA -     SL AMB POCT UROBILINOGEN 0.2     POCT URINE PROTEIN -      PH,UA 7.0     BLOOD,UA -     SPECIFIC GRAVITY,UA 1.005     KETONES,UA -     BILIRUBIN,UA -     GLUCOSE, UA -      COLOR,UA Yellow     CLARITY,UA Clear    ]  Lab Results   Component Value Date    PSA 0.6 11/25/2022     Lab Results   Component Value Date    GLUCOSE 97 10/01/2016    CALCIUM 9.6 11/25/2022     10/01/2016    K 4.8 11/25/2022    CO2 30 11/25/2022     11/25/2022    BUN 18 11/25/2022    CREATININE 0.92 11/25/2022     Lab Results   Component Value Date    WBC 10.01 11/25/2022    HGB 14.4 11/25/2022    HCT 43.8 11/25/2022    MCV 99 (H) 11/25/2022     11/25/2022       Maite Verduzco PA-C

## 2023-08-28 ENCOUNTER — TELEPHONE (OUTPATIENT)
Dept: FAMILY MEDICINE CLINIC | Facility: CLINIC | Age: 58
End: 2023-08-28

## 2023-08-28 ENCOUNTER — APPOINTMENT (OUTPATIENT)
Dept: LAB | Facility: CLINIC | Age: 58
End: 2023-08-28
Payer: COMMERCIAL

## 2023-08-28 DIAGNOSIS — N41.9 PROSTATITIS, UNSPECIFIED PROSTATITIS TYPE: ICD-10-CM

## 2023-08-28 DIAGNOSIS — N41.0 ACUTE PROSTATITIS: Primary | ICD-10-CM

## 2023-08-28 LAB
ANION GAP SERPL CALCULATED.3IONS-SCNC: 9 MMOL/L
BUN SERPL-MCNC: 14 MG/DL (ref 5–25)
CALCIUM SERPL-MCNC: 9.1 MG/DL (ref 8.4–10.2)
CHLORIDE SERPL-SCNC: 102 MMOL/L (ref 96–108)
CO2 SERPL-SCNC: 28 MMOL/L (ref 21–32)
CREAT SERPL-MCNC: 0.87 MG/DL (ref 0.6–1.3)
GFR SERPL CREATININE-BSD FRML MDRD: 95 ML/MIN/1.73SQ M
GLUCOSE P FAST SERPL-MCNC: 77 MG/DL (ref 65–99)
POTASSIUM SERPL-SCNC: 4.1 MMOL/L (ref 3.5–5.3)
SODIUM SERPL-SCNC: 139 MMOL/L (ref 135–147)

## 2023-08-28 PROCEDURE — 36415 COLL VENOUS BLD VENIPUNCTURE: CPT

## 2023-08-28 PROCEDURE — 80048 BASIC METABOLIC PNL TOTAL CA: CPT

## 2023-08-28 RX ORDER — HYDROCODONE BITARTRATE AND ACETAMINOPHEN 5; 325 MG/1; MG/1
1 TABLET ORAL EVERY 6 HOURS PRN
Qty: 28 TABLET | Refills: 0 | Status: SHIPPED | OUTPATIENT
Start: 2023-08-28 | End: 2023-09-04

## 2023-08-28 NOTE — TELEPHONE ENCOUNTER
Hi, this is Lakeisha Mera. I'm a patient of [facility] and  I went to the urologist earlier and Unfortunately she doesn't want to handle the pain that I am in because you guys handle my Paxil. So I need I need to get on something. I'm in excruciating pain. I'm on antibiotics, but I can't sit or anything right now and I just need something for a little while. OK? Have somebody call me back?  Please [phone], [phone]

## 2023-11-17 ENCOUNTER — HOSPITAL ENCOUNTER (OUTPATIENT)
Dept: CT IMAGING | Facility: CLINIC | Age: 58
Discharge: HOME | End: 2023-11-17
Payer: COMMERCIAL

## 2023-11-17 ENCOUNTER — OFFICE VISIT (OUTPATIENT)
Dept: FAMILY MEDICINE CLINIC | Facility: CLINIC | Age: 58
End: 2023-11-17
Payer: COMMERCIAL

## 2023-11-17 VITALS
OXYGEN SATURATION: 98 % | DIASTOLIC BLOOD PRESSURE: 82 MMHG | HEART RATE: 72 BPM | SYSTOLIC BLOOD PRESSURE: 124 MMHG | BODY MASS INDEX: 34.28 KG/M2 | HEIGHT: 68 IN | TEMPERATURE: 97.9 F | WEIGHT: 226.2 LBS

## 2023-11-17 DIAGNOSIS — Z12.11 SCREEN FOR COLON CANCER: ICD-10-CM

## 2023-11-17 DIAGNOSIS — Z87.891 STOPPED SMOKING WITH GREATER THAN 30 PACK YEAR HISTORY: ICD-10-CM

## 2023-11-17 DIAGNOSIS — N41.9 PROSTATITIS, UNSPECIFIED PROSTATITIS TYPE: ICD-10-CM

## 2023-11-17 DIAGNOSIS — Z12.5 SCREENING FOR PROSTATE CANCER: ICD-10-CM

## 2023-11-17 DIAGNOSIS — J32.0 CHRONIC MAXILLARY SINUSITIS: ICD-10-CM

## 2023-11-17 DIAGNOSIS — Z12.2 SCREENING FOR LUNG CANCER: ICD-10-CM

## 2023-11-17 DIAGNOSIS — E78.2 MIXED HYPERLIPIDEMIA: ICD-10-CM

## 2023-11-17 DIAGNOSIS — R53.82 CHRONIC FATIGUE: ICD-10-CM

## 2023-11-17 DIAGNOSIS — Z23 ENCOUNTER FOR IMMUNIZATION: ICD-10-CM

## 2023-11-17 DIAGNOSIS — Z00.00 ANNUAL PHYSICAL EXAM: Primary | ICD-10-CM

## 2023-11-17 PROCEDURE — 90471 IMMUNIZATION ADMIN: CPT

## 2023-11-17 PROCEDURE — 99396 PREV VISIT EST AGE 40-64: CPT | Performed by: NURSE PRACTITIONER

## 2023-11-17 PROCEDURE — 72193 CT PELVIS W/DYE: CPT

## 2023-11-17 PROCEDURE — 90686 IIV4 VACC NO PRSV 0.5 ML IM: CPT

## 2023-11-17 PROCEDURE — G1004 CDSM NDSC: HCPCS

## 2023-11-17 RX ORDER — PREDNISONE 20 MG/1
20 TABLET ORAL DAILY
Qty: 5 TABLET | Refills: 0 | Status: SHIPPED | OUTPATIENT
Start: 2023-11-17 | End: 2023-11-22

## 2023-11-17 RX ADMIN — IOHEXOL 100 ML: 350 INJECTION, SOLUTION INTRAVENOUS at 08:49

## 2023-11-17 NOTE — PROGRESS NOTES
ADULT ANNUAL [facility]    NAME: Tahmina Regan  AGE: 62 y.o. SEX: male  : 1965     DATE: 2023     Assessment and Plan:     Problem List Items Addressed This Visit        Other    Annual physical exam - Primary    Mixed hyperlipidemia    Relevant Orders    Comprehensive metabolic panel    Lipid Panel with Direct LDL reflex    BMI 33.0-33.9,adult    Screen for colon cancer    Relevant Orders    Ambulatory Referral to Gastroenterology    Screening for prostate cancer    Relevant Orders    PSA, Total Screen    Chronic fatigue    Relevant Orders    Vitamin D 25 hydroxy    CBC and differential   Other Visit Diagnoses     Stopped smoking with greater than 30 pack year history        Relevant Orders    CT lung screening program    Screening for lung cancer        Relevant Orders    CT lung screening program          Immunizations and preventive care screenings were discussed with patient today. Appropriate education was printed on patient's after visit summary. Discussed risks and benefits of prostate cancer screening. We discussed the controversial history of PSA screening for prostate cancer in the [facility] as well as the risk of over detection and over treatment of prostate cancer by way of PSA screening. The patient understands that PSA blood testing is an imperfect way to screen for prostate cancer and that elevated PSA levels in the blood may also be caused by infection, inflammation, prostatic trauma or manipulation, urological procedures, or by benign prostatic enlargement. The role of the digital rectal examination in prostate cancer screening was also discussed and I discussed with him that there is large interobserver variability in the findings of digital rectal examination. Counseling:  Dental Health: discussed importance of regular tooth brushing, flossing, and dental visits.   Exercise: the importance of regular exercise/physical activity was discussed. Recommend exercise 3-5 times per week for at least 30 minutes. BMI Counseling: Body mass index is 34.39 kg/m². The BMI is above normal. Nutrition recommendations include decreasing portion sizes, encouraging healthy choices of fruits and vegetables, decreasing fast food intake, consuming healthier snacks, limiting drinks that contain sugar and moderation in carbohydrate intake. Exercise recommendations include moderate physical activity 150 minutes/week. No pharmacotherapy was ordered. Patient referred to PCP. Rationale for BMI follow-up plan is due to patient being overweight or obese. Return in 1 year (on 11/17/2024). Chief Complaint:     Chief Complaint   Patient presents with   • Annual Exam      History of Present Illness:     Adult Annual Physical   Patient here for a comprehensive physical exam. The patient reports no problems. Patient here today for his check up and reports that he went back to work and reports that he had a pelvic CT scan and is planning to have a prostate biopsy and has ongoing issues with his chronic prostatitis. 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 Exercise: walking, moderate cardiovascular exercise, and 3-4 times a week on average. Depression Screening  PHQ-2/9 Depression Screening    Little interest or pleasure in doing things: 0 - not at all  Feeling down, depressed, or hopeless: 0 - not at all  PHQ-2 Score: 0  PHQ-2 Interpretation: Negative depression screen       General Health  Sleep: sleeps well.    Hearing: normal - bilateral.  Vision: goes for regular eye exams. Dental: regular dental visits.  Health  Symptoms include: none    Advanced Care Planning  Do you have an advanced directive? no  Do you have a durable medical power of ? no     Review of Systems:     Review of Systems   Constitutional:  Negative for activity change, appetite change, chills, diaphoresis, fatigue, fever and unexpected weight change. HENT:  Positive for congestion, rhinorrhea, sinus pressure and sinus pain. Negative for ear pain, hearing loss, postnasal drip, sneezing and sore throat. Eyes:  Negative for pain, redness and visual disturbance. Respiratory:  Positive for shortness of breath. Negative for cough. Cardiovascular:  Negative for chest pain and leg swelling. Gastrointestinal:  Negative for abdominal pain, diarrhea, nausea and vomiting. Endocrine: Negative. Genitourinary: Negative. Musculoskeletal:  Negative for arthralgias. Allergic/Immunologic: Negative. Neurological:  Negative for dizziness and light-headedness. Hematological: Negative. Psychiatric/Behavioral:  Negative for behavioral problems and dysphoric mood.        Past Medical History:     Past Medical History:   Diagnosis Date   • Benign localized prostatic hyperplasia with lower urinary tract symptoms (LUTS)     Last Assessed:8/25/2015   • Hypogonadism in male     last assessed:8/25/2015      Past Surgical History:     Past Surgical History:   Procedure Laterality Date   • NOSE SURGERY  2020   • VASECTOMY      Last Assessed:4/7/2015      Family History:     Family History   Problem Relation Age of Onset   • Glaucoma Mother    • Substance Abuse Neg Hx    • Mental illness Neg Hx       Social History:     Social History     Socioeconomic History   • Marital status: /Civil Union     Spouse name: None   • Number of children: None   • Years of education: None   • Highest education level: None   Occupational History   • None   Tobacco Use   • Smoking status: Former     Passive exposure: Past   • Smokeless tobacco: Never   Vaping Use   • Vaping Use: Never used   Substance and Sexual Activity   • Alcohol use: No   • Drug use: Never   • Sexual activity: None   Other Topics Concern   • None   Social History Narrative    Caffeine use     Social Determinants of Health     Financial Resource Strain: Not on file   Food Insecurity: Not on file   Transportation Needs: Not on file   Physical Activity: Not on file   Stress: Not on file   Social Connections: Not on file   Intimate Partner Violence: Not on file   Housing Stability: Not on file      Current Medications:     Current Outpatient Medications   Medication Sig Dispense Refill   • cetirizine (ZyrTEC) 10 mg tablet Take 10 mg by mouth daily     • loratadine (CLARITIN) 10 mg tablet Take 10 mg by mouth daily     • PARoxetine (PAXIL) 30 mg tablet TAKE 2 TABLETS DAILY 180 tablet 3   • tamsulosin (FLOMAX) 0.4 mg Take 1 capsule (0.4 mg total) by mouth daily with dinner 90 capsule 3   • ibuprofen (MOTRIN) 800 mg tablet Take 1 tablet (800 mg total) by mouth every 8 (eight) hours as needed for mild pain for up to 10 days 30 tablet 0     No current facility-administered medications for this visit. Allergies: Allergies   Allergen Reactions   • Meperidine    • Quinolones    • Sulfamethoxazole-Trimethoprim Other (See Comments)      Physical Exam:     /82 (BP Location: Left arm, Patient Position: Sitting)   Pulse 72   Temp 97.9 °F (36.6 °C) (Temporal)   Ht 5' 8" (1.727 m)   Wt 103 kg (226 lb 3.2 oz)   SpO2 98%   BMI 34.39 kg/m²     Physical Exam  Vitals and nursing note reviewed. Constitutional:       General: He is not in acute distress. Appearance: He is well-developed. HENT:      Head: Normocephalic and atraumatic.       Right Ear: Tympanic membrane normal.      Left Ear: Tympanic membrane normal.      Nose: Nose normal.      Mouth/Throat:      Mouth: Mucous membranes are moist.   Eyes:      Conjunctiva/sclera: Conjunctivae normal.   Cardiovascular:      Rate and Rhythm: Normal rate and regular rhythm. Pulses: Normal pulses. Heart sounds: No murmur heard. Pulmonary:      Effort: Pulmonary effort is normal. No respiratory distress. Breath sounds: Normal breath sounds. Abdominal:      Palpations: Abdomen is soft. Tenderness: There is no abdominal tenderness. Musculoskeletal:         General: No swelling. Cervical back: Neck supple. Skin:     General: Skin is warm and dry. Capillary Refill: Capillary refill takes less than 2 seconds. Neurological:      General: No focal deficit present. Mental Status: He is alert and oriented to person, place, and time.    Psychiatric:         Mood and Affect: Mood normal.          Dasha Marvin, [address] [place] [address] [address]

## 2023-11-17 NOTE — PATIENT INSTRUCTIONS
Wellness Visit for Adults   AMBULATORY CARE:   A wellness visit  is when you see your healthcare provider to get screened for health problems. Your healthcare provider will also give you advice on how to stay healthy. Write down your questions so you remember to ask them. Ask your healthcare provider how often you should have a wellness visit. What happens at a wellness visit:  Your healthcare provider will ask about your health, and your family history of health problems. This includes high blood pressure, heart disease, and cancer. He or she will ask if you have symptoms that concern you, if you smoke, and about your mood. You may also be asked about your intake of medicines, supplements, food, and alcohol. Any of the following may be done: Your weight  will be checked. Your height may also be checked so your body mass index (BMI) can be calculated. Your BMI shows if you are at a healthy weight. Your blood pressure  and heart rate will be checked. Your temperature may also be checked. Blood and urine tests  may be done. Blood tests may be done to check your cholesterol levels. Abnormal cholesterol levels increase your risk for heart disease and stroke. You may also need a blood or urine test to check for diabetes if you are at increased risk. Urine tests may be done to look for signs of an infection or kidney disease. A physical exam  includes checking your heartbeat and lungs with a stethoscope. Your healthcare provider may also check your skin to look for sun damage. Screening tests  may be recommended. A screening test is done to check for diseases that may not cause symptoms. The screening tests you may need depend on your age, gender, family history, and lifestyle habits. For example, colorectal screening may be recommended if you are 48years old or older. Screening tests you need if you are a woman:   A Pap smear  is used to screen for cervical cancer.  Pap smears are usually done every 3 to 5 years depending on your age. You may need them more often if you have had abnormal Pap smear test results in the past. Ask your healthcare provider how often you should have a Pap smear. A mammogram  is an x-ray of your breasts to screen for breast cancer. Experts recommend mammograms every 2 years starting at age 48 years. You may need a mammogram at age 52 years or younger if you have an increased risk for breast cancer. Talk to your healthcare provider about when you should start having mammograms and how often you need them. Vaccines you may need:   Get an influenza vaccine  every year. The influenza vaccine protects you from the flu. Several types of viruses cause the flu. The viruses change over time, so new vaccines are made each year. Get a tetanus-diphtheria (Td) booster vaccine  every 10 years. This vaccine protects you against tetanus and diphtheria. Tetanus is a severe infection that may cause painful muscle spasms and lockjaw. Diphtheria is a severe bacterial infection that causes a thick covering in the back of your mouth and throat. Get a human papillomavirus (HPV) vaccine  if you are female and aged 23 to 32 or male 23 to 24 and never received it. This vaccine protects you from HPV infection. HPV is the most common infection spread by sexual contact. HPV may also cause vaginal, penile, and anal cancers. Get a pneumococcal vaccine  if you are aged 72 years or older. The pneumococcal vaccine is an injection given to protect you from pneumococcal disease. Pneumococcal disease is an infection caused by pneumococcal bacteria. The infection may cause pneumonia, meningitis, or an ear infection. Get a shingles vaccine  if you are 60 or older, even if you have had shingles before. The shingles vaccine is an injection to protect you from the varicella-zoster virus. This is the same virus that causes chickenpox.  Shingles is a painful rash that develops in people who had chickenpox or have been exposed to the virus. How to eat healthy:  My Plate is a model for planning healthy meals. It shows the types and amounts of foods that should go on your plate. Fruits and vegetables make up about half of your plate, and grains and protein make up the other half. A serving of dairy is included on the side of your plate. The amount of calories and serving sizes you need depends on your age, gender, weight, and height. Examples of healthy foods are listed below:  Eat a variety of vegetables  such as dark green, red, and orange vegetables. You can also include canned vegetables low in sodium (salt) and frozen vegetables without added butter or sauces. Eat a variety of fresh fruits , canned fruit in 100% juice, frozen fruit, and dried fruit. Include whole grains. At least half of the grains you eat should be whole grains. Examples include whole-wheat bread, wheat pasta, brown rice, and whole-grain cereals such as oatmeal.    Eat a variety of protein foods such as seafood (fish and shellfish), lean meat, and poultry without skin (turkey and chicken). Examples of lean meats include pork leg, shoulder, or tenderloin, and beef round, sirloin, tenderloin, and extra lean ground beef. Other protein foods include eggs and egg substitutes, beans, peas, soy products, nuts, and seeds. Choose low-fat dairy products such as skim or 1% milk or low-fat yogurt, cheese, and cottage cheese. Limit unhealthy fats  such as butter, hard margarine, and shortening. Exercise:  Exercise at least 30 minutes per day on most days of the week. Some examples of exercise include walking, biking, dancing, and swimming. You can also fit in more physical activity by taking the stairs instead of the elevator or parking farther away from stores. Include muscle strengthening activities 2 days each week. Regular exercise provides many health benefits.  It helps you manage your weight, and decreases your risk for type 2 diabetes, heart disease, stroke, and high blood pressure. Exercise can also help improve your mood. Ask your healthcare provider about the best exercise plan for you. General health and safety guidelines:   Do not smoke. Nicotine and other chemicals in cigarettes and cigars can cause lung damage. Ask your healthcare provider for information if you currently smoke and need help to quit. E-cigarettes or smokeless tobacco still contain nicotine. Talk to your healthcare provider before you use these products. Limit alcohol. A drink of alcohol is 12 ounces of beer, 5 ounces of wine, or 1½ ounces of liquor. Lose weight, if needed. Being overweight increases your risk of certain health conditions. These include heart disease, high blood pressure, type 2 diabetes, and certain types of cancer. Protect your skin. Do not sunbathe or use tanning beds. Use sunscreen with a SPF 15 or higher. Apply sunscreen at least 15 minutes before you go outside. Reapply sunscreen every 2 hours. Wear protective clothing, hats, and sunglasses when you are outside. Drive safely. Always wear your seatbelt. Make sure everyone in your car wears a seatbelt. A seatbelt can save your life if you are in an accident. Do not use your cell phone when you are driving. This could distract you and cause an accident. Pull over if you need to make a call or send a text message. Practice safe sex. Use latex condoms if are sexually active and have more than one partner. Your healthcare provider may recommend screening tests for sexually transmitted infections (STIs). Wear helmets, lifejackets, and protective gear. Always wear a helmet when you ride a bike or motorcycle, go skiing, or play sports that could cause a head injury. Wear protective equipment when you play sports. Wear a lifejacket when you are on a boat or doing water sports.     © Copyright [facility] 2023 Information is for End User's use only and may not be sold, redistributed or otherwise used for commercial purposes. The above information is an  only. It is not intended as medical advice for individual conditions or treatments. Talk to your doctor, nurse or pharmacist before following any medical regimen to see if it is safe and effective for you.

## 2023-11-27 ENCOUNTER — TELEPHONE (OUTPATIENT)
Dept: UROLOGY | Facility: CLINIC | Age: 58
End: 2023-11-27

## 2023-11-27 NOTE — TELEPHONE ENCOUNTER
----- Message from Xena Escamilla PA-C sent at 11/27/2023  7:44 AM EST -----  CT negative for prostate abscess.

## 2023-11-27 NOTE — TELEPHONE ENCOUNTER
Called and spoke to PT. PT felt like the conversation wasn't clear. The PT cancelled his cysto trus  due to family emergency. Pt wants to schedule a consult, wants more information about the procedure. I asked PT if he wanted the call back number to make an appointment for a future date but PT denied it.     ----- Message from Maite Verduzco PA-C sent at 11/27/2023  7:44 AM EST -----  CT negative for prostate abscess.

## 2023-11-27 NOTE — TELEPHONE ENCOUNTER
----- Message from Brianda Good PA-C sent at 11/27/2023  7:44 AM EST -----  CT negative for prostate abscess.

## 2023-11-28 DIAGNOSIS — N41.1 CHRONIC PROSTATITIS: Primary | ICD-10-CM

## 2023-11-28 RX ORDER — DOXYCYCLINE HYCLATE 100 MG/1
100 CAPSULE ORAL EVERY 12 HOURS SCHEDULED
Qty: 28 CAPSULE | Refills: 0 | Status: SHIPPED | OUTPATIENT
Start: 2023-11-28 | End: 2023-12-12

## 2023-11-28 RX ORDER — HYDROCODONE BITARTRATE AND ACETAMINOPHEN 7.5; 325 MG/1; MG/1
1 TABLET ORAL EVERY 6 HOURS PRN
Qty: 30 TABLET | Refills: 0 | Status: SHIPPED | OUTPATIENT
Start: 2023-11-28 | End: 2023-12-05

## 2023-12-04 DIAGNOSIS — N13.8 BPH WITH OBSTRUCTION/LOWER URINARY TRACT SYMPTOMS: ICD-10-CM

## 2023-12-04 DIAGNOSIS — N40.1 BPH WITH OBSTRUCTION/LOWER URINARY TRACT SYMPTOMS: ICD-10-CM

## 2023-12-04 RX ORDER — TAMSULOSIN HYDROCHLORIDE 0.4 MG/1
CAPSULE ORAL
Qty: 90 CAPSULE | Refills: 3 | Status: SHIPPED | OUTPATIENT
Start: 2023-12-04

## 2023-12-07 ENCOUNTER — APPOINTMENT (OUTPATIENT)
Dept: LAB | Facility: CLINIC | Age: 58
End: 2023-12-07
Payer: COMMERCIAL

## 2023-12-08 DIAGNOSIS — E78.2 MIXED HYPERLIPIDEMIA: Primary | ICD-10-CM

## 2023-12-08 DIAGNOSIS — E55.9 VITAMIN D DEFICIENCY: ICD-10-CM

## 2023-12-08 RX ORDER — ERGOCALCIFEROL 1.25 MG/1
50000 CAPSULE ORAL WEEKLY
Qty: 12 CAPSULE | Refills: 1 | Status: SHIPPED | OUTPATIENT
Start: 2023-12-08 | End: 2024-06-05

## 2023-12-08 RX ORDER — ROSUVASTATIN CALCIUM 5 MG/1
5 TABLET, COATED ORAL DAILY
Qty: 30 TABLET | Refills: 3 | Status: SHIPPED | OUTPATIENT
Start: 2023-12-08 | End: 2024-06-05

## 2023-12-20 DIAGNOSIS — N41.1 CHRONIC PROSTATITIS: Primary | ICD-10-CM

## 2023-12-20 RX ORDER — DOXYCYCLINE HYCLATE 100 MG/1
100 CAPSULE ORAL EVERY 12 HOURS SCHEDULED
Qty: 14 CAPSULE | Refills: 0 | Status: SHIPPED | OUTPATIENT
Start: 2023-12-20 | End: 2023-12-27

## 2023-12-20 RX ORDER — HYDROCODONE BITARTRATE AND ACETAMINOPHEN 7.5; 325 MG/1; MG/1
1 TABLET ORAL EVERY 6 HOURS PRN
Qty: 28 TABLET | Refills: 0 | Status: SHIPPED | OUTPATIENT
Start: 2023-12-20 | End: 2023-12-21 | Stop reason: SDUPTHER

## 2023-12-21 ENCOUNTER — TELEPHONE (OUTPATIENT)
Dept: FAMILY MEDICINE CLINIC | Facility: CLINIC | Age: 58
End: 2023-12-21

## 2023-12-21 DIAGNOSIS — N41.1 CHRONIC PROSTATITIS: ICD-10-CM

## 2023-12-21 RX ORDER — HYDROCODONE BITARTRATE AND ACETAMINOPHEN 7.5; 325 MG/1; MG/1
1 TABLET ORAL EVERY 6 HOURS PRN
Qty: 28 TABLET | Refills: 0 | Status: SHIPPED | OUTPATIENT
Start: 2023-12-21 | End: 2023-12-28

## 2023-12-21 NOTE — TELEPHONE ENCOUNTER
12/21/2023 spoke with pt and he asked if you would send in the hydrocodone- acetaminophen to [facility] because [facility] will not be able to get them in till Friday.

## 2023-12-29 DIAGNOSIS — N41.0 ACUTE PROSTATITIS: ICD-10-CM

## 2023-12-29 RX ORDER — DOXYCYCLINE HYCLATE 100 MG/1
100 CAPSULE ORAL EVERY 12 HOURS SCHEDULED
Qty: 14 CAPSULE | Refills: 0 | Status: SHIPPED | OUTPATIENT
Start: 2023-12-29 | End: 2024-01-05

## 2023-12-29 RX ORDER — IBUPROFEN 800 MG/1
800 TABLET ORAL EVERY 8 HOURS PRN
Qty: 30 TABLET | Refills: 0 | Status: SHIPPED | OUTPATIENT
Start: 2023-12-29 | End: 2024-01-08

## 2024-01-01 DIAGNOSIS — E55.9 VITAMIN D DEFICIENCY: ICD-10-CM

## 2024-01-02 RX ORDER — ERGOCALCIFEROL 1.25 MG/1
50000 CAPSULE ORAL WEEKLY
Qty: 12 CAPSULE | Refills: 1 | Status: SHIPPED | OUTPATIENT
Start: 2024-01-02 | End: 2024-06-30

## 2024-01-08 ENCOUNTER — TELEPHONE (OUTPATIENT)
Age: 59
End: 2024-01-08

## 2024-01-08 ENCOUNTER — APPOINTMENT (EMERGENCY)
Dept: ULTRASOUND IMAGING | Facility: HOSPITAL | Age: 59
End: 2024-01-08
Payer: COMMERCIAL

## 2024-01-08 ENCOUNTER — HOSPITAL ENCOUNTER (EMERGENCY)
Facility: HOSPITAL | Age: 59
Discharge: HOME | End: 2024-01-08
Attending: STUDENT IN AN ORGANIZED HEALTH CARE EDUCATION/TRAINING PROGRAM | Admitting: STUDENT IN AN ORGANIZED HEALTH CARE EDUCATION/TRAINING PROGRAM
Payer: COMMERCIAL

## 2024-01-08 VITALS
HEART RATE: 75 BPM | SYSTOLIC BLOOD PRESSURE: 143 MMHG | OXYGEN SATURATION: 93 % | TEMPERATURE: 98.7 F | DIASTOLIC BLOOD PRESSURE: 89 MMHG | RESPIRATION RATE: 18 BRPM

## 2024-01-08 DIAGNOSIS — N41.9 PROSTATITIS: ICD-10-CM

## 2024-01-08 DIAGNOSIS — N50.819 TESTICULAR PAIN: Primary | ICD-10-CM

## 2024-01-08 LAB
ANION GAP SERPL CALCULATED.3IONS-SCNC: 8 MMOL/L
BASOPHILS # BLD AUTO: 0.08 THOUSANDS/ÂΜL (ref 0–0.1)
BASOPHILS NFR BLD AUTO: 2 % (ref 0–1)
BILIRUB UR QL STRIP: NEGATIVE
BUN SERPL-MCNC: 9 MG/DL (ref 5–25)
CALCIUM SERPL-MCNC: 9.8 MG/DL (ref 8.4–10.2)
CHLORIDE SERPL-SCNC: 106 MMOL/L (ref 96–108)
CLARITY UR: CLEAR
CO2 SERPL-SCNC: 25 MMOL/L (ref 21–32)
COLOR UR: COLORLESS
CREAT SERPL-MCNC: 0.83 MG/DL (ref 0.6–1.3)
EOSINOPHIL # BLD AUTO: 0.28 THOUSAND/ÂΜL (ref 0–0.61)
EOSINOPHIL NFR BLD AUTO: 5 % (ref 0–6)
ERYTHROCYTE [DISTWIDTH] IN BLOOD BY AUTOMATED COUNT: 12.1 % (ref 11.6–15.1)
GFR SERPL CREATININE-BSD FRML MDRD: 96 ML/MIN/1.73SQ M
GLUCOSE SERPL-MCNC: 97 MG/DL (ref 65–140)
GLUCOSE UR STRIP-MCNC: NEGATIVE MG/DL
HCT VFR BLD AUTO: 41.6 % (ref 36.5–49.3)
HGB BLD-MCNC: 14.4 G/DL (ref 12–17)
HGB UR QL STRIP.AUTO: NEGATIVE
IMM GRANULOCYTES # BLD AUTO: 0.01 THOUSAND/UL (ref 0–0.2)
IMM GRANULOCYTES NFR BLD AUTO: 0 % (ref 0–2)
KETONES UR STRIP-MCNC: NEGATIVE MG/DL
LEUKOCYTE ESTERASE UR QL STRIP: NEGATIVE
LYMPHOCYTES # BLD AUTO: 1.51 THOUSANDS/ÂΜL (ref 0.6–4.47)
LYMPHOCYTES NFR BLD AUTO: 28 % (ref 14–44)
MCH RBC QN AUTO: 33 PG (ref 26.8–34.3)
MCHC RBC AUTO-ENTMCNC: 34.6 G/DL (ref 31.4–37.4)
MCV RBC AUTO: 95 FL (ref 82–98)
MONOCYTES # BLD AUTO: 0.46 THOUSAND/ÂΜL (ref 0.17–1.22)
MONOCYTES NFR BLD AUTO: 9 % (ref 4–12)
NEUTROPHILS # BLD AUTO: 3.01 THOUSANDS/ÂΜL (ref 1.85–7.62)
NEUTS SEG NFR BLD AUTO: 56 % (ref 43–75)
NITRITE UR QL STRIP: NEGATIVE
NRBC BLD AUTO-RTO: 0 /100 WBCS
PH UR STRIP.AUTO: 6.5 [PH]
PLATELET # BLD AUTO: 317 THOUSANDS/UL (ref 149–390)
PMV BLD AUTO: 8.8 FL (ref 8.9–12.7)
POTASSIUM SERPL-SCNC: 4.8 MMOL/L (ref 3.5–5.3)
PROT UR STRIP-MCNC: NEGATIVE MG/DL
RBC # BLD AUTO: 4.37 MILLION/UL (ref 3.88–5.62)
SODIUM SERPL-SCNC: 139 MMOL/L (ref 135–147)
SP GR UR STRIP.AUTO: 1 (ref 1–1.03)
UROBILINOGEN UR STRIP-ACNC: <2 MG/DL
WBC # BLD AUTO: 5.35 THOUSAND/UL (ref 4.31–10.16)

## 2024-01-08 PROCEDURE — 87491 CHLMYD TRACH DNA AMP PROBE: CPT | Performed by: STUDENT IN AN ORGANIZED HEALTH CARE EDUCATION/TRAINING PROGRAM

## 2024-01-08 PROCEDURE — 36415 COLL VENOUS BLD VENIPUNCTURE: CPT | Performed by: STUDENT IN AN ORGANIZED HEALTH CARE EDUCATION/TRAINING PROGRAM

## 2024-01-08 PROCEDURE — 76870 US EXAM SCROTUM: CPT

## 2024-01-08 PROCEDURE — 96374 THER/PROPH/DIAG INJ IV PUSH: CPT

## 2024-01-08 PROCEDURE — 99285 EMERGENCY DEPT VISIT HI MDM: CPT | Performed by: STUDENT IN AN ORGANIZED HEALTH CARE EDUCATION/TRAINING PROGRAM

## 2024-01-08 PROCEDURE — 85025 COMPLETE CBC W/AUTO DIFF WBC: CPT | Performed by: STUDENT IN AN ORGANIZED HEALTH CARE EDUCATION/TRAINING PROGRAM

## 2024-01-08 PROCEDURE — 81003 URINALYSIS AUTO W/O SCOPE: CPT | Performed by: STUDENT IN AN ORGANIZED HEALTH CARE EDUCATION/TRAINING PROGRAM

## 2024-01-08 PROCEDURE — 99284 EMERGENCY DEPT VISIT MOD MDM: CPT

## 2024-01-08 PROCEDURE — 80048 BASIC METABOLIC PNL TOTAL CA: CPT | Performed by: STUDENT IN AN ORGANIZED HEALTH CARE EDUCATION/TRAINING PROGRAM

## 2024-01-08 PROCEDURE — 87591 N.GONORRHOEAE DNA AMP PROB: CPT | Performed by: STUDENT IN AN ORGANIZED HEALTH CARE EDUCATION/TRAINING PROGRAM

## 2024-01-08 RX ORDER — DOXYCYCLINE HYCLATE 100 MG/1
100 CAPSULE ORAL 2 TIMES DAILY
Qty: 28 CAPSULE | Refills: 0 | Status: SHIPPED | OUTPATIENT
Start: 2024-01-08 | End: 2024-01-22

## 2024-01-08 RX ORDER — MORPHINE SULFATE 4 MG/ML
4 INJECTION, SOLUTION INTRAMUSCULAR; INTRAVENOUS ONCE
Status: COMPLETED | OUTPATIENT
Start: 2024-01-08 | End: 2024-01-08

## 2024-01-08 RX ORDER — DOXYCYCLINE HYCLATE 100 MG/1
100 CAPSULE ORAL ONCE
Status: COMPLETED | OUTPATIENT
Start: 2024-01-08 | End: 2024-01-08

## 2024-01-08 RX ADMIN — DOXYCYCLINE HYCLATE 100 MG: 100 CAPSULE ORAL at 12:48

## 2024-01-08 RX ADMIN — MORPHINE SULFATE 4 MG: 4 INJECTION INTRAVENOUS at 11:21

## 2024-01-08 NOTE — ED PROVIDER NOTES
History  Chief Complaint   Patient presents with    Testicle Pain     Pt with testicle pain right in the center, states he's been on antibiotics and as soon he goes off them the pain comes back.  Pt states that this has been on an going issue      HPI    Patient is a 58-year-old male present emerged department for his chronic prostate Robert.  Patient says he has testicular pain.  Patient had been on doxycycline for approximately 4 weeks.  Patient states that once the doxycycline ended his pain came back.  Patient said this has been ongoing.  He has seen urology for this.  He denies any recent fevers or chills nausea or vomiting.  Denies any abdominal discomfort or change in bowel bladder habits.  History includes enlarged prostate.  No pertinent family medical history.  Patient is a former smoker.    Prior to Admission Medications   Prescriptions Last Dose Informant Patient Reported? Taking?   PARoxetine (PAXIL) 30 mg tablet  Self No No   Sig: TAKE 2 TABLETS DAILY   cetirizine (ZyrTEC) 10 mg tablet  Self Yes No   Sig: Take 10 mg by mouth daily   ergocalciferol (VITAMIN D2) 50,000 units  Self No No   Sig: Take 1 capsule (50,000 Units total) by mouth once a week   ibuprofen (MOTRIN) 800 mg tablet   No No   Sig: Take 1 tablet (800 mg total) by mouth every 8 (eight) hours as needed for mild pain for up to 10 days   loratadine (CLARITIN) 10 mg tablet  Self Yes No   Sig: Take 10 mg by mouth daily   rosuvastatin (CRESTOR) 5 mg tablet  Self No No   Sig: Take 1 tablet (5 mg total) by mouth daily      Facility-Administered Medications: None       Past Medical History:   Diagnosis Date    Benign localized prostatic hyperplasia with lower urinary tract symptoms (LUTS)     Last Assessed:8/25/2015    Hypogonadism in male     last assessed:8/25/2015       Past Surgical History:   Procedure Laterality Date    NOSE SURGERY  2020    VASECTOMY      Last Assessed:4/7/2015       Family History   Problem Relation Age of Onset    Glaucoma  Mother     Substance Abuse Neg Hx     Mental illness Neg Hx      I have reviewed and agree with the history as documented.    E-Cigarette/Vaping    E-Cigarette Use Never User      E-Cigarette/Vaping Substances    Nicotine No     THC No     CBD No     Flavoring No     Other No     Unknown No      Social History     Tobacco Use    Smoking status: Former     Passive exposure: Past    Smokeless tobacco: Never   Vaping Use    Vaping status: Never Used   Substance Use Topics    Alcohol use: No    Drug use: Never       Review of Systems   Constitutional:  Negative for chills and fever.   HENT:  Negative for ear pain and sore throat.    Eyes:  Negative for pain and visual disturbance.   Respiratory:  Negative for cough and shortness of breath.    Cardiovascular:  Negative for chest pain and palpitations.   Gastrointestinal:  Negative for abdominal pain and vomiting.   Genitourinary:  Positive for dysuria, scrotal swelling and testicular pain. Negative for hematuria.   Musculoskeletal:  Negative for arthralgias and back pain.   Skin:  Negative for color change and rash.   Neurological:  Negative for seizures and syncope.   All other systems reviewed and are negative.      Physical Exam  Physical Exam  Vitals and nursing note reviewed.   Constitutional:       General: He is not in acute distress.     Appearance: He is well-developed.   HENT:      Head: Normocephalic and atraumatic.   Eyes:      Conjunctiva/sclera: Conjunctivae normal.   Cardiovascular:      Rate and Rhythm: Normal rate and regular rhythm.      Heart sounds: No murmur heard.  Pulmonary:      Effort: Pulmonary effort is normal. No respiratory distress.      Breath sounds: Normal breath sounds.   Abdominal:      Palpations: Abdomen is soft.      Tenderness: There is no abdominal tenderness.   Genitourinary:     Penis: Normal.       Comments: Erythema on scrotal sac. Tenderness to bilateral testes.   Musculoskeletal:         General: No swelling.      Cervical  back: Neck supple.   Skin:     General: Skin is warm and dry.      Capillary Refill: Capillary refill takes less than 2 seconds.   Neurological:      General: No focal deficit present.      Mental Status: He is alert and oriented to person, place, and time.   Psychiatric:         Mood and Affect: Mood normal.         Vital Signs  ED Triage Vitals [01/08/24 1005]   Temperature Pulse Respirations Blood Pressure SpO2   98 °F (36.7 °C) (!) 108 18 170/80 98 %      Temp Source Heart Rate Source Patient Position - Orthostatic VS BP Location FiO2 (%)   Oral Monitor Sitting Left arm --      Pain Score       10 - Worst Possible Pain           Vitals:    01/08/24 1005 01/08/24 1218 01/08/24 1253   BP: 170/80 129/81 143/89   Pulse: (!) 108 80 75   Patient Position - Orthostatic VS: Sitting Sitting Sitting         Visual Acuity      ED Medications  Medications   morphine injection 4 mg (4 mg Intravenous Given 1/8/24 1121)   doxycycline hyclate (VIBRAMYCIN) capsule 100 mg (100 mg Oral Given 1/8/24 1248)       Diagnostic Studies  Results Reviewed       Procedure Component Value Units Date/Time    Chlamydia/GC amplified DNA by PCR [[id number]]  (Normal) Collected: 01/08/24 1042    Lab Status: Final result Specimen: Urine, Other Updated: 01/09/24 1302     N gonorrhoeae, DNA Probe Negative     Chlamydia trachomatis, DNA Probe Negative    Narrative:      This test was performed using the FDA-approved Preeti 6800 CT/NG assay ([employer]). This test uses real-time PCR to detect Chlamydia trachomatis (CT) and Neisseria gonorrhoeae (NG). This instrument and assay have been validated by the  and performing laboratory and verified by the performing laboratory.  This test is intended as an aid in the diagnosis of chlamydial and gonococcal disease. This test has not been evaluated in patients younger than 14 years of age and is not recommended for evaluation of suspected sexual abuse. This assay is not intended to replace  other exams or tests for diagnosis of urogenital infection by causative factors other than Chalmydia trachomatis (CT) and Neisseria gonorrhoeae (NG). Additional testing is recommended when the results do not correlate with clinical signs and symptoms.   Procedural Limitations  This assay has only been validated for use with male and female urine, clinician-instructed self-collected vaginal swab specimens, clinician-collected vaginal swab specimens, endocervical swab specimens collected in aida® PCR Media and cervical specimens collected in PreservCyt® Solution. Assay performance has not been validated for use with other collection media and/or specimen types.   Detection of C. trachomatis and N. gonorrhoeaea is dependent on the number of organisms present in the specimen. Detection may be affected by specimen collection methods, patient factors, stage of infection, infecting strains, and presence of polymerase/PCR inhibitors.   When CT is present at very high concentrations, the detection of NG present at concentrations near the limit of detection may be impacted.  The presence of mucus in endocervical specimens may lead to false negative results.  The presence of whole blood in urine and cervical specimens collected in PreservCyt Solution may lead to false negative and/or invalid test results.   Urine testing is recommended to be performed on first catch urine samples. The effects of other collection variables have not been evaluated at this time. The effects of vaginal discharge, tampon use, douching, and other collection variables have not been evaluated at this time.   This assay has not been evaluated with patients currently being treated with antimicrobial agents active against CT or NG, or patients with a history of hysterectomy.     Basic metabolic panel [[id number]] Collected: 01/08/24 1119    Lab Status: Final result Specimen: Blood from Hand, Right Updated: 01/08/24 1143     Sodium 139 mmol/L       Potassium 4.8 mmol/L      Chloride 106 mmol/L      CO2 25 mmol/L      ANION GAP 8 mmol/L      BUN 9 mg/dL      Creatinine 0.83 mg/dL      Glucose 97 mg/dL      Calcium 9.8 mg/dL      eGFR 96 ml/min/1.73sq m     Narrative:      National Kidney Disease Foundation guidelines for Chronic Kidney Disease (CKD):     Stage 1 with normal or high GFR (GFR > 90 mL/min/1.73 square meters)    Stage 2 Mild CKD (GFR = 60-89 mL/min/1.73 square meters)    Stage 3A Moderate CKD (GFR = 45-59 mL/min/1.73 square meters)    Stage 3B Moderate CKD (GFR = 30-44 mL/min/1.73 square meters)    Stage 4 Severe CKD (GFR = 15-29 mL/min/1.73 square meters)    Stage 5 End Stage CKD (GFR <15 mL/min/1.73 square meters)  Note: GFR calculation is accurate only with a steady state creatinine    CBC and differential [[id number]]  (Abnormal) Collected: 01/08/24 1119    Lab Status: Final result Specimen: Blood from Hand, Right Updated: 01/08/24 1124     WBC 5.35 Thousand/uL      RBC 4.37 Million/uL      Hemoglobin 14.4 g/dL      Hematocrit 41.6 %      MCV 95 fL      MCH 33.0 pg      MCHC 34.6 g/dL      RDW 12.1 %      MPV 8.8 fL      Platelets 317 Thousands/uL      nRBC 0 /100 WBCs      Neutrophils Relative 56 %      Immat GRANS % 0 %      Lymphocytes Relative 28 %      Monocytes Relative 9 %      Eosinophils Relative 5 %      Basophils Relative 2 %      Neutrophils Absolute 3.01 Thousands/µL      Immature Grans Absolute 0.01 Thousand/uL      Lymphocytes Absolute 1.51 Thousands/µL      Monocytes Absolute 0.46 Thousand/µL      Eosinophils Absolute 0.28 Thousand/µL      Basophils Absolute 0.08 Thousands/µL     UA w Reflex to Microscopic w Reflex to Culture [[id number]]  (Abnormal) Collected: 01/08/24 1042    Lab Status: Final result Specimen: Urine, Clean Catch Updated: 01/08/24 1050     Color, UA Colorless     Clarity, UA Clear     Specific Gravity, UA 1.002     pH, UA 6.5     Leukocytes, UA Negative     Nitrite, UA Negative     Protein, UA Negative mg/dl       Glucose, UA Negative mg/dl      Ketones, UA Negative mg/dl      Urobilinogen, UA <2.0 mg/dl      Bilirubin, UA Negative     Occult Blood, UA Negative                   US scrotum and testicles   Final Result by Torres Carty MD (01/08 1159)      No acute abnormality.      Workstation performed: [id number]                    Procedures  Procedures         ED Course                                             Medical Decision Making  Differential testicular torsion, hydrocele, chronic pancreatitis, UTI.    Patient is a well-appearing 58-year-old male presenting no acute respiratory distress and vital signs overall unremarkable.  Patient appears uncomfortable during bedside examination.  Tenderness noted on physical exam.    CBC and BMP unremarkable.  UA is a negative.  Pending gonorrhea and Chlamydia testing.  Ultrasound of scrotum and testicles is nonacute.    Discussed case with on-call urology who recommended continuing his doxycycline prescription and keeping the follow-up appointment for the following day.  Patient had concerns that he might need IV antibiotics and discussed with them that there is no need for that at this time.  Patient verbalized understanding for return follow-up precautions as well as urology follow-up.  Disposition discharge    Amount and/or Complexity of Data Reviewed  Labs: ordered.  Radiology: ordered.    Risk  Prescription drug management.             Disposition  Final diagnoses:   Testicular pain   Prostatitis     Time reflects when diagnosis was documented in both MDM as applicable and the Disposition within this note       Time User Action Codes Description Comment    1/8/2024 12:37 PM Leda Brothers Add [N50.819] Testicular pain     1/8/2024 12:38 PM Leda Brothers Add [N41.9] Prostatitis           ED Disposition       ED Disposition   Discharge    Condition   Stable    Date/Time   Mon Jan 8, 2024 12:38 PM    Comment   Cristihan Lee discharge to home/self care.                    Follow-up Information       Follow up With Specialties Details Why Contact Info    [facility], Nurse Practitioner   [address] [place] [address]  [phone]              Discharge Medication List as of 1/8/2024 12:44 PM        START taking these medications    Details   doxycycline hyclate (VIBRAMYCIN) 100 mg capsule Take 1 capsule (100 mg total) by mouth 2 (two) times a day for 14 days, Starting Mon 1/8/2024, Until Mon 1/22/2024, Normal           CONTINUE these medications which have NOT CHANGED    Details   cetirizine (ZyrTEC) 10 mg tablet Take 10 mg by mouth daily, Historical Med      ergocalciferol (VITAMIN D2) 50,000 units Take 1 capsule (50,000 Units total) by mouth once a week, Starting Tue 1/2/2024, Until Sun 6/30/2024, Normal      ibuprofen (MOTRIN) 800 mg tablet Take 1 tablet (800 mg total) by mouth every 8 (eight) hours as needed for mild pain for up to 10 days, Starting Fri 12/29/2023, Until Mon 1/8/2024 at 2359, Normal      loratadine (CLARITIN) 10 mg tablet Take 10 mg by mouth daily, Historical Med      PARoxetine (PAXIL) 30 mg tablet TAKE 2 TABLETS DAILY, Normal      rosuvastatin (CRESTOR) 5 mg tablet Take 1 tablet (5 mg total) by mouth daily, Starting Fri 12/8/2023, Until Wed 6/5/2024, Normal      tamsulosin (FLOMAX) 0.4 mg TAKE 1 CAPSULE DAILY WITH DINNER, Normal             No discharge procedures on file.    PDMP Review         Value Time User    PDMP Reviewed  Yes 11/28/2023  7:13 AM [facility]            ED Provider  Electronically Signed by             Leda Brothers DO  01/11/24 1685

## 2024-01-08 NOTE — DISCHARGE INSTRUCTIONS
You have been prescribed doxycycline to continue taking.  Use over-the-counter medications as needed for discomfort such as Tylenol and Motrin.      Is important you follow-up with urology tomorrow with your arranged appointment.    Return if you develop any new or worsening symptoms such as increased swelling, redness, development of fever or chills.

## 2024-01-08 NOTE — TELEPHONE ENCOUNTER
"Patient's spouse Pat called to have pt seen ASAP for c/o \"chronic prostate pain\".    Pt just finished a second round of doxycycline prescribved by PCP. Which usually help with symptoms, but this time it has not.    Pt is scheduled for 1/9 at 1 PM in [place].    Please review if patient needs anything prior to visit.    Call back (Spouse Pat) [phone]  "

## 2024-01-09 ENCOUNTER — OFFICE VISIT (OUTPATIENT)
Dept: UROLOGY | Facility: CLINIC | Age: 59
End: 2024-01-09
Payer: COMMERCIAL

## 2024-01-09 VITALS
HEIGHT: 68 IN | WEIGHT: 220 LBS | BODY MASS INDEX: 33.34 KG/M2 | HEART RATE: 68 BPM | OXYGEN SATURATION: 98 % | DIASTOLIC BLOOD PRESSURE: 90 MMHG | SYSTOLIC BLOOD PRESSURE: 135 MMHG

## 2024-01-09 DIAGNOSIS — R30.0 DYSURIA: ICD-10-CM

## 2024-01-09 DIAGNOSIS — N40.1 BPH WITH OBSTRUCTION/LOWER URINARY TRACT SYMPTOMS: ICD-10-CM

## 2024-01-09 DIAGNOSIS — R10.2 PELVIC PAIN: ICD-10-CM

## 2024-01-09 DIAGNOSIS — N13.8 BPH WITH OBSTRUCTION/LOWER URINARY TRACT SYMPTOMS: ICD-10-CM

## 2024-01-09 DIAGNOSIS — N41.9 PROSTATITIS, UNSPECIFIED PROSTATITIS TYPE: Primary | ICD-10-CM

## 2024-01-09 LAB
C TRACH DNA SPEC QL NAA+PROBE: NEGATIVE
N GONORRHOEA DNA SPEC QL NAA+PROBE: NEGATIVE

## 2024-01-09 PROCEDURE — 99214 OFFICE O/P EST MOD 30 MIN: CPT | Performed by: PHYSICIAN ASSISTANT

## 2024-01-09 RX ORDER — PHENAZOPYRIDINE HYDROCHLORIDE 100 MG/1
100 TABLET, FILM COATED ORAL 3 TIMES DAILY PRN
Qty: 10 TABLET | Refills: 0 | Status: SHIPPED | OUTPATIENT
Start: 2024-01-09

## 2024-01-09 RX ORDER — TAMSULOSIN HYDROCHLORIDE 0.4 MG/1
0.8 CAPSULE ORAL
Qty: 180 CAPSULE | Refills: 1 | Status: SHIPPED | OUTPATIENT
Start: 2024-01-09

## 2024-01-09 NOTE — H&P (VIEW-ONLY)
1/9/2024      Chief Complaint   Patient presents with    Follow-up         Assessment and Plan    Pelvic pain  LUT  Chronic prostatitis  - Seen in ED yesterday   - US scrotum/testes from yesterday negative  - UA yesterday negative  - G/C testing in process.   - Currently on doxycycline, continue course of abx. Allergic to Bactrim and quinolones  - Continue Flomax, increase to double dose  - Continue NSAIDs PRN  - Rx for pyridium provided for dysuria  - Referral to pelvic floor PT provided  - Referral to pain management provided for MSK work-up to r/o lumbar radiculopathy given radiating pain down back of thighs  - Proceed with work-up with cystoscopy TRUS, patient requests this to be performed in OR due to anxiety regarding procedure. Case request placed. Consent signed.     History of Present Illness  Cristhian Lee is a 58 y.o. male here for follow up evaluation of  prostatitis. Was seen in the ED yesterday for this. Was discharged with course of doxycycline. He has history of recurrent prostatitis, typically 3-4 episodes of this per year. He reports these issues have been going on for many years. He reports everything began after he had a vasectomy with Dr. Boone. He reports prior surgery for testicular torsion when he was younger. He reports after vasectomy reversal symptoms improved for some time. He localizes pain to penis, scrotum, perineum, buttocks and back of thighs. Denies any numbness, tingling, or incontinence. Admits to dysuria and weak urinary stream as well. Currently on Flomax daily. Reports symptoms improve slightly on doxycycline but shortly return after he finishes the antibiotics. He reports prostatic massage used to help his symptoms.     CT pelvis from 11/17/23 - Mildly enlarged prostate containing coarse calcifications.  No intraparenchymal lesion to suggest prostate abscess.     PSA from 12/7/23 was 0.70.       Review of Systems   Constitutional:  Negative for chills and fever.    Respiratory:  Negative for shortness of breath.    Cardiovascular:  Negative for chest pain.   Gastrointestinal:  Negative for abdominal pain.   Genitourinary:  Positive for dysuria, penile pain and testicular pain. Negative for difficulty urinating, flank pain, frequency, hematuria, penile discharge, penile swelling, scrotal swelling and urgency.   Musculoskeletal:  Negative for back pain.   Neurological:  Negative for dizziness.   Psychiatric/Behavioral:  Positive for agitation and dysphoric mood.                   Past Medical History  Past Medical History:   Diagnosis Date    Benign localized prostatic hyperplasia with lower urinary tract symptoms (LUTS)     Last Assessed:8/25/2015    Hypogonadism in male     last assessed:8/25/2015       Past Social History  Past Surgical History:   Procedure Laterality Date    NOSE SURGERY  2020    VASECTOMY      Last Assessed:4/7/2015     Social History     Tobacco Use   Smoking Status Former    Passive exposure: Past   Smokeless Tobacco Never       Past Family History  Family History   Problem Relation Age of Onset    Glaucoma Mother     Substance Abuse Neg Hx     Mental illness Neg Hx        Past Social history  Social History     Socioeconomic History    Marital status: /Civil Union     Spouse name: Not on file    Number of children: Not on file    Years of education: Not on file    Highest education level: Not on file   Occupational History    Not on file   Tobacco Use    Smoking status: Former     Passive exposure: Past    Smokeless tobacco: Never   Vaping Use    Vaping status: Never Used   Substance and Sexual Activity    Alcohol use: No    Drug use: Never    Sexual activity: Not on file   Other Topics Concern    Not on file   Social History Narrative    Caffeine use     Social Determinants of Health     Financial Resource Strain: Not on file   Food Insecurity: Not on file   Transportation Needs: Not on file   Physical Activity: Not on file   Stress: Not on  "file   Social Connections: Not on file   Intimate Partner Violence: Not on file   Housing Stability: Not on file       Current Medications  Current Outpatient Medications   Medication Sig Dispense Refill    cetirizine (ZyrTEC) 10 mg tablet Take 10 mg by mouth daily      doxycycline hyclate (VIBRAMYCIN) 100 mg capsule Take 1 capsule (100 mg total) by mouth 2 (two) times a day for 14 days 28 capsule 0    ergocalciferol (VITAMIN D2) 50,000 units Take 1 capsule (50,000 Units total) by mouth once a week 12 capsule 1    loratadine (CLARITIN) 10 mg tablet Take 10 mg by mouth daily      PARoxetine (PAXIL) 30 mg tablet TAKE 2 TABLETS DAILY 180 tablet 3    rosuvastatin (CRESTOR) 5 mg tablet Take 1 tablet (5 mg total) by mouth daily 30 tablet 3    tamsulosin (FLOMAX) 0.4 mg TAKE 1 CAPSULE DAILY WITH DINNER 90 capsule 3    ibuprofen (MOTRIN) 800 mg tablet Take 1 tablet (800 mg total) by mouth every 8 (eight) hours as needed for mild pain for up to 10 days 30 tablet 0     No current facility-administered medications for this visit.       Allergies  Allergies   Allergen Reactions    Meperidine     Quinolones     Sulfamethoxazole-Trimethoprim Other (See Comments)         The following portions of the patient's history were reviewed and updated as appropriate: allergies, current medications, past medical history, past social history, past surgical history and problem list.      Vitals  Vitals:    01/09/24 1243   BP: 135/90   Pulse: 68   SpO2: 98%   Weight: 99.8 kg (220 lb)   Height: 5' 8\" (1.727 m)           Physical Exam  Physical Exam  Constitutional:       Appearance: Normal appearance.   HENT:      Head: Normocephalic and atraumatic.      Right Ear: External ear normal.      Left Ear: External ear normal.      Nose: Nose normal.   Eyes:      General: No scleral icterus.     Conjunctiva/sclera: Conjunctivae normal.   Cardiovascular:      Rate and Rhythm: Normal rate and regular rhythm.      Pulses: Normal pulses.      Heart " sounds: Normal heart sounds.   Pulmonary:      Effort: Pulmonary effort is normal.      Breath sounds: Normal breath sounds.   Abdominal:      General: Abdomen is flat. Bowel sounds are normal.      Palpations: Abdomen is soft.      Tenderness: There is no abdominal tenderness.   Genitourinary:     Penis: Normal.       Comments: No scrotal swelling or erythema. No rashes or lesions. Testes diffusely hypersensitive/tender. High rectal tone. Prostate diffusely tender, no nodularity.   Musculoskeletal:         General: Normal range of motion.      Cervical back: Normal range of motion.   Skin:     General: Skin is warm and dry.   Neurological:      General: No focal deficit present.      Mental Status: He is alert and oriented to person, place, and time.   Psychiatric:         Behavior: Behavior normal.         Thought Content: Thought content normal.         Judgment: Judgment normal.      Comments: Very frustrated regarding current condition           Results  No results found for this or any previous visit (from the past 1 hour(s)).]  Lab Results   Component Value Date    PSA 0.70 12/07/2023    PSA 0.6 11/25/2022     Lab Results   Component Value Date    GLUCOSE 97 10/01/2016    CALCIUM 9.8 01/08/2024     10/01/2016    K 4.8 01/08/2024    CO2 25 01/08/2024     01/08/2024    BUN 9 01/08/2024    CREATININE 0.83 01/08/2024     Lab Results   Component Value Date    WBC 5.35 01/08/2024    HGB 14.4 01/08/2024    HCT 41.6 01/08/2024    MCV 95 01/08/2024     01/08/2024           Orders  No orders of the defined types were placed in this encounter.    Oralia Harp

## 2024-01-10 ENCOUNTER — PREP FOR PROCEDURE (OUTPATIENT)
Dept: UROLOGY | Facility: CLINIC | Age: 59
End: 2024-01-10

## 2024-01-10 ENCOUNTER — TELEPHONE (OUTPATIENT)
Dept: UROLOGY | Facility: CLINIC | Age: 59
End: 2024-01-10

## 2024-01-10 DIAGNOSIS — R39.89 SUSPECTED UTI: ICD-10-CM

## 2024-01-10 DIAGNOSIS — R10.2 PELVIC PAIN: ICD-10-CM

## 2024-01-10 DIAGNOSIS — D72.824 BASOPHILIA: ICD-10-CM

## 2024-01-10 DIAGNOSIS — Z01.812 PRE-OPERATIVE LABORATORY EXAMINATION: ICD-10-CM

## 2024-01-10 DIAGNOSIS — N40.1 BPH WITH OBSTRUCTION/LOWER URINARY TRACT SYMPTOMS: Primary | ICD-10-CM

## 2024-01-10 DIAGNOSIS — N13.8 BPH WITH OBSTRUCTION/LOWER URINARY TRACT SYMPTOMS: Primary | ICD-10-CM

## 2024-01-10 DIAGNOSIS — N41.1 CHRONIC PROSTATITIS: Primary | ICD-10-CM

## 2024-01-10 DIAGNOSIS — N41.9 PROSTATITIS, UNSPECIFIED PROSTATITIS TYPE: ICD-10-CM

## 2024-01-10 NOTE — TELEPHONE ENCOUNTER
Pt returned call/scheduled pt for 1/19/2024 w/ Dr Kasper for surgery @ [facility]/urine cult ordered for 1/11/2024/surgical packet emailed to pt/pt for TRUS (noted in special needs for US and tech)

## 2024-01-10 NOTE — TELEPHONE ENCOUNTER
----- Message from Oralia Harp PA-C sent at 1/9/2024  1:34 PM EST -----  Please schedule cystoscopy and TRUS in the OR

## 2024-01-11 ENCOUNTER — APPOINTMENT (OUTPATIENT)
Dept: LAB | Facility: CLINIC | Age: 59
End: 2024-01-11
Payer: COMMERCIAL

## 2024-01-11 ENCOUNTER — ANESTHESIA EVENT (OUTPATIENT)
Dept: PERIOP | Facility: AMBULARY SURGERY CENTER | Age: 59
End: 2024-01-11
Payer: COMMERCIAL

## 2024-01-11 DIAGNOSIS — N40.1 BPH WITH OBSTRUCTION/LOWER URINARY TRACT SYMPTOMS: ICD-10-CM

## 2024-01-11 DIAGNOSIS — N13.8 BPH WITH OBSTRUCTION/LOWER URINARY TRACT SYMPTOMS: ICD-10-CM

## 2024-01-11 DIAGNOSIS — Z01.812 PRE-OPERATIVE LABORATORY EXAMINATION: ICD-10-CM

## 2024-01-11 DIAGNOSIS — N41.1 CHRONIC PROSTATITIS: ICD-10-CM

## 2024-01-11 DIAGNOSIS — D72.824 BASOPHILIA: ICD-10-CM

## 2024-01-11 DIAGNOSIS — R39.89 SUSPECTED UTI: ICD-10-CM

## 2024-01-11 DIAGNOSIS — N41.9 PROSTATITIS, UNSPECIFIED PROSTATITIS TYPE: ICD-10-CM

## 2024-01-11 DIAGNOSIS — R10.2 PELVIC PAIN: ICD-10-CM

## 2024-01-11 LAB
ANA SER QL IA: NEGATIVE
CREAT UR-MCNC: 15.9 MG/DL
CRP SERPL QL: 1.4 MG/L
ERYTHROCYTE [SEDIMENTATION RATE] IN BLOOD: 12 MM/HOUR (ref 0–19)
MICROALBUMIN UR-MCNC: <7 MG/L
MICROALBUMIN/CREAT 24H UR: <44 MG/G CREATININE (ref 0–30)

## 2024-01-11 PROCEDURE — 85652 RBC SED RATE AUTOMATED: CPT

## 2024-01-11 PROCEDURE — 82570 ASSAY OF URINE CREATININE: CPT | Performed by: NURSE PRACTITIONER

## 2024-01-11 PROCEDURE — 86225 DNA ANTIBODY NATIVE: CPT

## 2024-01-11 PROCEDURE — 36415 COLL VENOUS BLD VENIPUNCTURE: CPT

## 2024-01-11 PROCEDURE — 86038 ANTINUCLEAR ANTIBODIES: CPT

## 2024-01-11 PROCEDURE — 87086 URINE CULTURE/COLONY COUNT: CPT

## 2024-01-11 PROCEDURE — 82043 UR ALBUMIN QUANTITATIVE: CPT | Performed by: NURSE PRACTITIONER

## 2024-01-11 PROCEDURE — 86140 C-REACTIVE PROTEIN: CPT

## 2024-01-12 LAB
BACTERIA UR CULT: NORMAL
DSDNA AB SER-ACNC: <1 IU/ML (ref 0–9)

## 2024-01-15 NOTE — TELEPHONE ENCOUNTER
Patient's spouse Pat called today with pre op questions.    Caller was warm transferred to  for assistance.

## 2024-01-16 PROBLEM — Z12.11 SCREEN FOR COLON CANCER: Status: RESOLVED | Noted: 2023-11-17 | Resolved: 2024-01-16

## 2024-01-16 PROBLEM — Z12.5 SCREENING FOR PROSTATE CANCER: Status: RESOLVED | Noted: 2023-11-17 | Resolved: 2024-01-16

## 2024-01-18 PROBLEM — N41.1 CHRONIC PROSTATITIS: Status: ACTIVE | Noted: 2024-01-18

## 2024-01-18 RX ORDER — ASCORBIC ACID 1000 MG
TABLET ORAL
COMMUNITY

## 2024-01-18 RX ORDER — IBUPROFEN 200 MG
TABLET ORAL EVERY 6 HOURS PRN
COMMUNITY
End: 2024-01-19

## 2024-01-18 NOTE — DISCHARGE INSTR - AVS FIRST PAGE
Candido,    Today you had cystoscopy and transrectal ultrasound in the operating room setting.  This shows that your prostate is obstructing your bladder outlet and that it measures 27.6 g or so..  These findings will be discussed further with you in the office setting and you can decide what sort of outlet procedure you would like to undergo.  Typically these are all variations on removal or displacement of blocking prostate tissue to open your bladder outlet and urethra.  Options include transurethral resection of prostate, simple prostatectomy, UroLift, and Rezum, amongst others.  I recommend you consider a transurethral resection of prostate as this will help to open up your prostate glands and likely help with your symptoms (although there are no guarantees in surgery, unfortunately).    Please read about transurethral resection of prostate and think about whether or not you wish to proceed with this procedure.  I will arrange for a visit in the office to book this surgery should you so desire    If you have questions or concerns in the postoperative timeframe, do let us know.  We wish you a rapid and uneventful recovery,  Dr. Kasper

## 2024-01-18 NOTE — PRE-PROCEDURE INSTRUCTIONS
Pre-Surgery Instructions:   Medication Instructions    cetirizine (ZyrTEC) 10 mg tablet Take day of surgery.    Coenzyme Q10 (Co Q 10) 10 MG CAPS Hold day of surgery.    doxycycline hyclate (VIBRAMYCIN) 100 mg capsule Pt holding DOS     ergocalciferol (VITAMIN D2) 50,000 unit Weekly on Sunday    ibuprofen (MOTRIN) 200 mg tablet Stop taking 1 day prior to surgery.    loratadine (CLARITIN) 10 mg tablet Take day of surgery.    NON FORMULARY Hold day of surgery.    Omega-3 Fatty Acids (FISH OIL PO) Hold day of surgery.    PARoxetine (PAXIL) 30 mg tablet Take day of surgery.    tamsulosin (FLOMAX) 0.4 mg Take night before surgery   Medication instructions for day surgery reviewed. Please use only a sip of water to take your instructed medications. Avoid all over the counter vitamins, supplements and NSAIDS for one week prior to surgery per anesthesia guidelines. Tylenol is ok to take as needed.     You will receive a call one business day prior to surgery with an arrival time and hospital directions. If your surgery is scheduled on a Monday, the hospital will be calling you on the Friday prior to your surgery. If you have not heard from anyone by 8pm, please call the hospital supervisor through the hospital  at [phone]. (Maxi [phone]).    Do not eat or drink anything after midnight the night before your surgery, including candy, mints, lifesavers, or chewing gum. Do not drink alcohol 24hrs before your surgery. Try not to smoke at least 24hrs before your surgery.       Follow the pre surgery showering instructions as listed in the “My Surgical Experience Booklet” or otherwise provided by your surgeon's office. Do not use a blade to shave the surgical area 1 week before surgery. It is okay to use a clean electric clippers up to 24 hours before surgery. Do not apply any lotions, creams, including makeup, cologne, deodorant, or perfumes after showering on the day of your surgery. Do not use dry shampoo,  hair spray, hair gel, or any type of hair products.     No contact lenses, eye make-up, or artificial eyelashes. Remove nail polish, including gel polish, and any artificial, gel, or acrylic nails if possible. Remove all jewelry including rings and body piercing jewelry.     Wear causal clothing that is easy to take on and off. Consider your type of surgery.    Keep any valuables, jewelry, piercings at home. Please bring any specially ordered equipment (sling, braces) if indicated.    Arrange for a responsible person to drive you to and from the hospital on the day of your surgery. Visitor Guidelines discussed.     Call the surgeon's office with any new illnesses, exposures, or additional questions prior to surgery.    Please reference your “My Surgical Experience Booklet” for additional information to prepare for your upcoming surgery.

## 2024-01-19 ENCOUNTER — TELEPHONE (OUTPATIENT)
Dept: UROLOGY | Facility: CLINIC | Age: 59
End: 2024-01-19

## 2024-01-19 ENCOUNTER — HOSPITAL ENCOUNTER (OUTPATIENT)
Facility: AMBULARY SURGERY CENTER | Age: 59
Setting detail: OUTPATIENT SURGERY
Discharge: HOME | End: 2024-01-19
Attending: UROLOGY | Admitting: UROLOGY
Payer: COMMERCIAL

## 2024-01-19 ENCOUNTER — ANESTHESIA (OUTPATIENT)
Dept: PERIOP | Facility: AMBULARY SURGERY CENTER | Age: 59
End: 2024-01-19
Payer: COMMERCIAL

## 2024-01-19 ENCOUNTER — APPOINTMENT (OUTPATIENT)
Dept: RADIOLOGY | Facility: AMBULARY SURGERY CENTER | Age: 59
End: 2024-01-19
Payer: COMMERCIAL

## 2024-01-19 VITALS
BODY MASS INDEX: 32.13 KG/M2 | RESPIRATION RATE: 16 BRPM | OXYGEN SATURATION: 97 % | DIASTOLIC BLOOD PRESSURE: 75 MMHG | TEMPERATURE: 97.1 F | HEIGHT: 68 IN | WEIGHT: 212 LBS | HEART RATE: 73 BPM | SYSTOLIC BLOOD PRESSURE: 120 MMHG

## 2024-01-19 DIAGNOSIS — N41.1 CHRONIC PROSTATITIS: Primary | ICD-10-CM

## 2024-01-19 PROCEDURE — 76872 US TRANSRECTAL: CPT | Performed by: UROLOGY

## 2024-01-19 PROCEDURE — C1769 GUIDE WIRE: HCPCS | Performed by: UROLOGY

## 2024-01-19 PROCEDURE — C1758 CATHETER, URETERAL: HCPCS | Performed by: UROLOGY

## 2024-01-19 PROCEDURE — 52000 CYSTOURETHROSCOPY: CPT | Performed by: UROLOGY

## 2024-01-19 RX ORDER — FENTANYL CITRATE/PF 50 MCG/ML
25 SYRINGE (ML) INJECTION
Status: DISCONTINUED | OUTPATIENT
Start: 2024-01-19 | End: 2024-01-19 | Stop reason: HOSPADM

## 2024-01-19 RX ORDER — ACETAMINOPHEN 500 MG
500 TABLET ORAL EVERY 6 HOURS
Qty: 20 TABLET | Refills: 0 | Status: SHIPPED | OUTPATIENT
Start: 2024-01-19 | End: 2024-01-24

## 2024-01-19 RX ORDER — CEFAZOLIN SODIUM 2 G/50ML
2000 SOLUTION INTRAVENOUS ONCE
Status: COMPLETED | OUTPATIENT
Start: 2024-01-19 | End: 2024-01-19

## 2024-01-19 RX ORDER — PHENAZOPYRIDINE HYDROCHLORIDE 100 MG/1
100 TABLET, FILM COATED ORAL
Status: DISCONTINUED | OUTPATIENT
Start: 2024-01-19 | End: 2024-01-19 | Stop reason: HOSPADM

## 2024-01-19 RX ORDER — DICLOFENAC POTASSIUM 50 MG/1
50 TABLET, FILM COATED ORAL 2 TIMES DAILY
Qty: 10 TABLET | Refills: 0 | Status: SHIPPED | OUTPATIENT
Start: 2024-01-19 | End: 2024-01-24

## 2024-01-19 RX ORDER — ACETAMINOPHEN 325 MG/1
650 TABLET ORAL EVERY 6 HOURS PRN
Status: DISCONTINUED | OUTPATIENT
Start: 2024-01-19 | End: 2024-01-19 | Stop reason: HOSPADM

## 2024-01-19 RX ORDER — OXYCODONE HYDROCHLORIDE 5 MG/1
5 TABLET ORAL EVERY 4 HOURS PRN
Status: DISCONTINUED | OUTPATIENT
Start: 2024-01-19 | End: 2024-01-19 | Stop reason: HOSPADM

## 2024-01-19 RX ORDER — FENTANYL CITRATE 50 UG/ML
INJECTION, SOLUTION INTRAMUSCULAR; INTRAVENOUS AS NEEDED
Status: DISCONTINUED | OUTPATIENT
Start: 2024-01-19 | End: 2024-01-19

## 2024-01-19 RX ORDER — MAGNESIUM HYDROXIDE 1200 MG/15ML
LIQUID ORAL AS NEEDED
Status: DISCONTINUED | OUTPATIENT
Start: 2024-01-19 | End: 2024-01-19 | Stop reason: HOSPADM

## 2024-01-19 RX ORDER — PROPOFOL 10 MG/ML
INJECTION, EMULSION INTRAVENOUS AS NEEDED
Status: DISCONTINUED | OUTPATIENT
Start: 2024-01-19 | End: 2024-01-19

## 2024-01-19 RX ORDER — MIDAZOLAM HYDROCHLORIDE 2 MG/2ML
INJECTION, SOLUTION INTRAMUSCULAR; INTRAVENOUS AS NEEDED
Status: DISCONTINUED | OUTPATIENT
Start: 2024-01-19 | End: 2024-01-19

## 2024-01-19 RX ORDER — ONDANSETRON 2 MG/ML
INJECTION INTRAMUSCULAR; INTRAVENOUS AS NEEDED
Status: DISCONTINUED | OUTPATIENT
Start: 2024-01-19 | End: 2024-01-19

## 2024-01-19 RX ORDER — ONDANSETRON 2 MG/ML
4 INJECTION INTRAMUSCULAR; INTRAVENOUS ONCE AS NEEDED
Status: DISCONTINUED | OUTPATIENT
Start: 2024-01-19 | End: 2024-01-19 | Stop reason: HOSPADM

## 2024-01-19 RX ORDER — DEXAMETHASONE SODIUM PHOSPHATE 10 MG/ML
INJECTION, SOLUTION INTRAMUSCULAR; INTRAVENOUS AS NEEDED
Status: DISCONTINUED | OUTPATIENT
Start: 2024-01-19 | End: 2024-01-19

## 2024-01-19 RX ORDER — SODIUM CHLORIDE, SODIUM LACTATE, POTASSIUM CHLORIDE, CALCIUM CHLORIDE 600; 310; 30; 20 MG/100ML; MG/100ML; MG/100ML; MG/100ML
INJECTION, SOLUTION INTRAVENOUS CONTINUOUS PRN
Status: DISCONTINUED | OUTPATIENT
Start: 2024-01-19 | End: 2024-01-19

## 2024-01-19 RX ORDER — LIDOCAINE HYDROCHLORIDE 20 MG/ML
INJECTION, SOLUTION EPIDURAL; INFILTRATION; INTRACAUDAL; PERINEURAL AS NEEDED
Status: DISCONTINUED | OUTPATIENT
Start: 2024-01-19 | End: 2024-01-19

## 2024-01-19 RX ORDER — ALBUTEROL SULFATE 2.5 MG/3ML
2.5 SOLUTION RESPIRATORY (INHALATION) ONCE AS NEEDED
Status: DISCONTINUED | OUTPATIENT
Start: 2024-01-19 | End: 2024-01-19 | Stop reason: HOSPADM

## 2024-01-19 RX ORDER — ONDANSETRON 2 MG/ML
4 INJECTION INTRAMUSCULAR; INTRAVENOUS EVERY 6 HOURS PRN
Status: DISCONTINUED | OUTPATIENT
Start: 2024-01-19 | End: 2024-01-19 | Stop reason: HOSPADM

## 2024-01-19 RX ORDER — DIPHENHYDRAMINE HCL 25 MG
12.5 TABLET ORAL EVERY 6 HOURS PRN
Status: DISCONTINUED | OUTPATIENT
Start: 2024-01-19 | End: 2024-01-19 | Stop reason: HOSPADM

## 2024-01-19 RX ADMIN — FENTANYL CITRATE 50 MCG: 50 INJECTION INTRAMUSCULAR; INTRAVENOUS at 07:31

## 2024-01-19 RX ADMIN — FENTANYL CITRATE 25 MCG: 50 INJECTION INTRAMUSCULAR; INTRAVENOUS at 08:06

## 2024-01-19 RX ADMIN — SODIUM CHLORIDE, SODIUM LACTATE, POTASSIUM CHLORIDE, AND CALCIUM CHLORIDE: .6; .31; .03; .02 INJECTION, SOLUTION INTRAVENOUS at 07:28

## 2024-01-19 RX ADMIN — MIDAZOLAM 2 MG: 1 INJECTION INTRAMUSCULAR; INTRAVENOUS at 07:26

## 2024-01-19 RX ADMIN — FENTANYL CITRATE 25 MCG: 50 INJECTION INTRAMUSCULAR; INTRAVENOUS at 07:52

## 2024-01-19 RX ADMIN — DEXAMETHASONE SODIUM PHOSPHATE 10 MG: 10 INJECTION, SOLUTION INTRAMUSCULAR; INTRAVENOUS at 07:38

## 2024-01-19 RX ADMIN — OXYCODONE HYDROCHLORIDE 5 MG: 5 TABLET ORAL at 09:29

## 2024-01-19 RX ADMIN — ONDANSETRON 4 MG: 2 INJECTION INTRAMUSCULAR; INTRAVENOUS at 07:49

## 2024-01-19 RX ADMIN — PROPOFOL 200 MG: 10 INJECTION, EMULSION INTRAVENOUS at 07:31

## 2024-01-19 RX ADMIN — FENTANYL CITRATE 25 MCG: 50 INJECTION INTRAMUSCULAR; INTRAVENOUS at 07:55

## 2024-01-19 RX ADMIN — PROPOFOL 120 MCG/KG/MIN: 10 INJECTION, EMULSION INTRAVENOUS at 07:33

## 2024-01-19 RX ADMIN — CEFAZOLIN SODIUM 2000 MG: 2 SOLUTION INTRAVENOUS at 07:34

## 2024-01-19 RX ADMIN — LIDOCAINE HYDROCHLORIDE 100 MG: 20 INJECTION, SOLUTION EPIDURAL; INFILTRATION; INTRACAUDAL; PERINEURAL at 07:31

## 2024-01-19 NOTE — TELEPHONE ENCOUNTER
Status post cystoscopy and transrectal ultrasound.  I recommend that he be booked for a transurethral resection of prostate.  Please arrange for a follow-up visit in the office in 2 weeks to book TURP if he decides that he wants this done for treatment of his lower urinary tract symptoms and chronic prostatitis

## 2024-01-19 NOTE — OP NOTE
OPERATIVE REPORT  PATIENT NAME: Cristhian Lee    :  1965  MRN: [id number]  Pt Location: [facility] OR [facility] 05    SURGERY DATE: 2024    Surgeons and Role:     * Diego Kasper MD - Primary    Preop Diagnosis:  BPH with obstruction/lower urinary tract symptoms [N40.1, N13.8]  Prostatitis, unspecified prostatitis type [N41.9]  Pelvic pain [R10.2]    Post-Op Diagnosis Codes:     * BPH with obstruction/lower urinary tract symptoms [N40.1, N13.8]     * Prostatitis, unspecified prostatitis type [N41.9]     * Pelvic pain [R10.2]    Procedure(s):  CYSTOSCOPY. TRUS    Specimen(s):  * No specimens in log *    Estimated Blood Loss:   Minimal    Drains:  * No LDAs found *    Anesthesia Type:   General    Operative Indications:  BPH with obstruction/lower urinary tract symptoms [N40.1, N13.8]  Prostatitis, unspecified prostatitis type [N41.9]  Pelvic pain [R10.2]      Operative Findings:  No malignant findings, no strictures, tight prostate with a high bladder neck, the prostate measures around 27 g or so.  I recommend that he consider a transurethral resection of prostate in the future.  There are no masses or tumors within the bladder.    Complications:   None    Procedure and Technique:    Candido has a history of chronic lower urinary tract symptoms and chronic prostatitis.  He presents today for cystoscopy and transrectal ultrasound performed in the operating room due to severe patient anxiety.    He was seen by the preoperative nursing and anesthesia team on the day of surgery.  He was brought to the operating room and placed under general anesthesia by way of a laryngeal mask airway.  Preoperative antibiotics were given.  He was placed in the lithotomy position with sequential compression devices in place.  All pressure points were padded.  A timeout confirm the proper patient position and procedure.    A 22 Nepali rigid cystoscope was placed per urethra, no strictures were noted, a intact sphincter is noted,  a high and tight and obstructed prostate is noted with elevation of the bladder neck and a puckering effect here.  The bladder is seen to have mild to moderate trabeculation but no lesions tumors defects or stones.    Transrectal ultrasound was then performed.  The prostate measures right around 27 g.  I recommend that he consider a TURP in the future given his longstanding lower urinary tract symptoms and symptoms of recurrent UTI with chronic prostatitis.    He was awakened from anesthesia and taken to the recovery room.       I was present for the entire procedure. and A qualified resident physician was not available.    Patient Disposition:  PACU     Plan: He will need to urinate prior to going home.  We will see him in the office to book him for a TURP should he so desire        SIGNATURE: Diego Kasper MD  DATE: January 19, 2024  TIME: 7:50 AM

## 2024-01-19 NOTE — INTERVAL H&P NOTE
H&P reviewed. After examining the patient I find no changes in the patients condition since the H&P had been written.    Vitals:    01/19/24 0654   BP: 136/91   Pulse: 72   Resp: 17   Temp: (!) 97.4 °F (36.3 °C)   SpO2: 98%   Proceed to cystoscopy and transrectal ultrasound with all indicated procedures

## 2024-01-19 NOTE — ANESTHESIA POSTPROCEDURE EVALUATION
Post-Op Assessment Note    CV Status:  Stable  Pain Score: 0    Pain management: adequate       Mental Status:  Sleepy and arousable   Hydration Status:  Stable   PONV Controlled:  None   Airway Patency:  Patent  Two or more mitigation strategies used for obstructive sleep apnea   Post Op Vitals Reviewed: Yes      Staff: CRNA               /74 (01/19/24 0757)    Temp 97.6 °F (36.4 °C) (01/19/24 0757)    Pulse 81 (01/19/24 0757)   Resp 12 (01/19/24 0757)    SpO2 96 % (01/19/24 0757)

## 2024-01-19 NOTE — ANESTHESIA PREPROCEDURE EVALUATION
Procedure:  CYSTOSCOPY, TRUS (Bladder)    Relevant Problems   CARDIO   (+) Mixed hyperlipidemia      /RENAL   (+) BPH with obstruction/lower urinary tract symptoms   (+) Chronic prostatitis      MUSCULOSKELETAL   (+) Lateral epicondylitis of left elbow        Physical Exam    Airway    Mallampati score: II  TM Distance: >3 FB  Neck ROM: full     Dental   No notable dental hx     Cardiovascular  Rhythm: regular, Rate: normal    Pulmonary   Breath sounds clear to auscultation    Other Findings        Anesthesia Plan  ASA Score- 2     Anesthesia Type- general with ASA Monitors.         Additional Monitors:     Airway Plan: LMA.           Plan Factors-Exercise tolerance (METS): >4 METS.    Chart reviewed. EKG reviewed.  Existing labs reviewed. Patient summary reviewed.    Patient is not a current smoker.      Obstructive sleep apnea risk education given perioperatively.        Induction- intravenous.    Postoperative Plan- Plan for postoperative opioid use.     Informed Consent- Anesthetic plan and risks discussed with patient.  I personally reviewed this patient with the CRNA. Discussed and agreed on the Anesthesia Plan with the CRNA..

## 2024-01-22 NOTE — TELEPHONE ENCOUNTER
Appt for surgical discussion with Leila (ok with her) scheduled next week. Please call to confirm

## 2024-01-22 NOTE — TELEPHONE ENCOUNTER
Patient called office. Would like to discuss next steps and getting surgery done. Pt has questions about time out of work and recovery. Please contact patient

## 2024-01-28 NOTE — PROGRESS NOTES
PT Evaluation     Today's date: 2024  Patient name: Cristhian Lee  : 1965  MRN: [id number]  Referring provider: Oralia Harp PA-C  Dx:   Encounter Diagnosis     ICD-10-CM    1. Prostatitis, unspecified prostatitis type  N41.9                      Assessment  Assessment details: Candido is a 58 year old male with concerns of pelvic pain and ongoing chronic prostatitis. He presents with limited global pelvic and hip mobility and increased tone in PFM. Direct PFM examination deferred until next treatment session. HEP instruction this date. Verbalized and demonstrated understanding. Written handouts provided. Time spent in patient education regarding toileting body mechanics, PFM anatomy, bladder and PFM Patient could benefit from a trial of PFPT to address presenting impairments and functional limitations and improve overall quality of life.  Patient will see urology this week for follow up from cystoscopy. Patient will wait for follow up to schedule continuing PT.   Impairments: abnormal muscle tone, abnormal or restricted ROM, lacks appropriate home exercise program and pain with function  Understanding of Dx/Px/POC: good   Prognosis: good    Goals  STG 2 weeks  1. Decrease perineal pain 25% or greater  2. Independent in ongoing HEP  3. Improved voluntary relaxation PFM post activation    LTG  10 weeks  1. Improve LUTS with more complete voiding and better initiation of urine stream.  2. Decrease use of bladder irritants  3. 3/5 PFM with full relaxation post activation  4. Resume exercise program without increase in pelvic pain.     Plan  Plan details: Pending outcome of urology follow up this week  Patient would benefit from: skilled physical therapy  Planned therapy interventions: manual therapy, motor coordination training, neuromuscular re-education, patient education, behavior modification, therapeutic activities, therapeutic exercise and home exercise program  Frequency: 1x week  Duration in  "visits: 10  Plan of Care beginning date: 1/29/2024  Plan of Care expiration date: 4/28/2024  Treatment plan discussed with: patient        PT Pelvic Floor Subjective:   History of Present Illness:   History of ongoing chronic prostatitis for many years that began post vasectomy surgery.  Since 2005 . Had vasectomy reversed the following year due to pain. Relieved with antibiotics but then symptoms return. Recent cystoscopy with TRUS where TURP was recommended per chart review.     Notes increased symptoms with increase protein intake; mucinex D triggers pain;  Notes nocturia 3-5 times per night  During the day varying void interval with difficulty initiating urine stream and occasional post void dribble.  Seen in ER on 1/8 secondary to prostatitis flare wit hpain near 10/10.                   Recurrent probem      Social Support:     Lives with:  Spouse    Relationship status: /committed    Work status: employed full time    Life stress severity: moderate  Diet and Exercise:    Diet:balanced nutrition    Enjoys running;  stopped since recent exacerbation  Minimal stretching  Bladder Function:     Voiding Difficulties positive for: frequent urination, hesitancy and incomplete emptying       Voiding Difficulties comments:     Voiding frequency: every 1-2 hours    Urinary leakage: urine leakage    Urinary leakage aggravated by: post-void dribble    Nocturia (episodes per night): 4 or more    Fluid Intake Type:  Coffee and water    Intake (ounces): Water: 130, Coffee: 80,     Intake (ounces) comment: Discussed reducing bladder irritants and volume to half body weight as means to address frequency  Bowel Function:     Bowel Function comments:  Denies bowel issue    Bowel frequency: daily    Uses \"squatty potty\": no Squatty Potty (suggested use today)  Sexual Function:     Sexually Active:  Sexually activeSexual function: able to achieve ejaculation, ejaculation pain and testicular pain  Pain:     Current pain " ratin    At best pain ratin    At worst pain ratin    Location:  Perineum posterior thigh and gluteal and high inner thigh    Onset:  More than 2 years ago    Quality:  Throbbing and pressure    Aggravating factors:  Prolonged positions, intercourse and eating certain foods (running)    Relieving factors:  Medications  Patient Goals:     Patient goals for therapy:  Decreased pain, improved bladder or bowel function, fully empty bladder or bowels and improved quality of life    Objective     Concurrent Complaints  Positive for disturbed sleep. Negative for bowel dysfunction and saddle (S4) numbness    Neurological Testing     Sensation     Lumbar   Left   Intact: light touch    Right   Intact: light touch    Active Range of Motion     Lumbar   Flexion:  Restriction level: minimal  Extension:  Restriction level: moderate  Left lateral flexion:  WFL  Right lateral flexion:  WFL    Strength/Myotome Testing     Additional Strength Details  No gross motor deficits    General Comments:      Hip Comments   Moderate hamstring, adductor and piriformis tightness.   5/5 hip ABD/ADD      Abdominal Assessment:      Abdominal Assessment: No TTP  Lipoma left UQ       General Perineum Exam:   perineum intact.     General perineum exam comments: Deferred direct PFM MELBA  Palpation at ischial tuberosity and perineal body.   TTP transverse perineal that eased with ischemic techniques and STM                     POC expires Unit limit Auth Expiration date PT/OT + Visit Limit?   24 4  4                           Visit/Unit Tracking  AUTH Status:  Date               AUTH after 4th visi Used 1               Remaining  3                                    Precautions:       Manuals                                                                 Neuro Re-Ed                                                                                                        Ther Ex             Piriformis stretch HEP             Adductor/happy baby stretch HEP                         Diaphragmatic breathing nv            Pfm relaxation nv                                                   Ther Activity             PFM ed PP            Bladder health PP            Kavon taylor PP                                      Gait Training                                       Modalities

## 2024-01-29 ENCOUNTER — EVALUATION (OUTPATIENT)
Dept: PHYSICAL THERAPY | Age: 59
End: 2024-01-29
Payer: COMMERCIAL

## 2024-01-29 DIAGNOSIS — N41.9 PROSTATITIS, UNSPECIFIED PROSTATITIS TYPE: Primary | ICD-10-CM

## 2024-01-29 PROCEDURE — 97110 THERAPEUTIC EXERCISES: CPT | Performed by: PHYSICAL THERAPIST

## 2024-01-29 PROCEDURE — 97162 PT EVAL MOD COMPLEX 30 MIN: CPT | Performed by: PHYSICAL THERAPIST

## 2024-01-29 PROCEDURE — 97530 THERAPEUTIC ACTIVITIES: CPT | Performed by: PHYSICAL THERAPIST

## 2024-01-31 NOTE — PROGRESS NOTES
"2/1/2024      Chief Complaint   Patient presents with    Follow-up         Assessment and Plan    58 y.o. male     1. BPH with LUTS  2. Chronic prostatitis   - s/p cystoscopy TRUS (1/19/24) in OR  - Discussed TURP. Patient declines. Would like to have Rezium procedure   - Follow up with MD to discuss  - Call with any questions or concerns in the meantime  - All questions answered; patient understands and agrees with plan       History of Present Illness  Cristhian Lee is a 58 y.o. male patient with history of BPH with LUTS and chronic prostatitis here for follow up.     Patient with history of chronic prostatitis. Underwent cystoscopy TURP in the OR. Recommendations were for TURP. Patient presents today to discuss TURP.     Review of Systems   Constitutional:  Negative for activity change, appetite change, chills and fever.   HENT:  Negative for congestion and trouble swallowing.    Respiratory:  Negative for cough and shortness of breath.    Cardiovascular:  Negative for chest pain, palpitations and leg swelling.   Gastrointestinal:  Negative for abdominal pain, constipation, diarrhea, nausea and vomiting.   Genitourinary:  Negative for difficulty urinating, dysuria, flank pain, frequency, hematuria and urgency.   Musculoskeletal:  Negative for back pain and gait problem.   Skin:  Negative for wound.   Allergic/Immunologic: Negative for immunocompromised state.   Neurological:  Negative for dizziness and syncope.   Hematological:  Does not bruise/bleed easily.   Psychiatric/Behavioral:  Negative for confusion.    All other systems reviewed and are negative.      Vitals  Vitals:    02/01/24 1211   BP: 130/85   Pulse: 85   SpO2: 94%   Weight: 97.1 kg (214 lb)   Height: 5' 7\" (1.702 m)       Physical Exam  Constitutional:       General: He is not in acute distress.     Appearance: Normal appearance. He is not ill-appearing, toxic-appearing or diaphoretic.   HENT:      Head: Normocephalic.      Nose: No " congestion.   Eyes:      General: No scleral icterus.        Right eye: No discharge.         Left eye: No discharge.      Conjunctiva/sclera: Conjunctivae normal.      Pupils: Pupils are equal, round, and reactive to light.   Pulmonary:      [place]: Pulmonary effort is normal.   Musculoskeletal:      Cervical back: Normal range of motion.   Skin:     General: Skin is warm and dry.      Coloration: Skin is not jaundiced or pale.      Findings: No bruising, erythema, lesion or rash.   Neurological:      General: No focal deficit present.      Mental Status: He is alert and oriented to person, place, and time. Mental status is at baseline.      Gait: Gait normal.   Psychiatric:         Mood and Affect: Mood normal.         Behavior: Behavior normal.         Thought Content: Thought content normal.         Judgment: Judgment normal.           Past History  Past Medical History:   Diagnosis Date    Benign localized prostatic hyperplasia with lower urinary tract symptoms (LUTS)     Last Assessed:8/25/2015    Hypogonadism in male     last assessed:8/25/2015    PONV (postoperative nausea and vomiting)      Social History     Socioeconomic History    Marital status: /Civil Union     Spouse name: None    Number of children: None    Years of education: None    Highest education level: None   Occupational History    None   Tobacco Use    Smoking status: Former     Passive exposure: Past    Smokeless tobacco: Never   Vaping Use    Vaping status: Never Used   Substance and Sexual Activity    Alcohol use: No    Drug use: Never    Sexual activity: None   Other Topics Concern    None   Social History Narrative    Caffeine use     Social Determinants of Health     Financial Resource Strain: Not on file   Food Insecurity: Not on file   Transportation Needs: Not on file   Physical Activity: Not on file   Stress: Not on file   Social Connections: Not on file   Intimate Partner Violence: Not on file   Housing Stability: Not on  file     Social History     Tobacco Use   Smoking Status Former    Passive exposure: Past   Smokeless Tobacco Never     Family History   Problem Relation Age of Onset    Glaucoma Mother     Substance Abuse Neg Hx     Mental illness Neg Hx        The following portions of the patient's history were reviewed and updated as appropriate: allergies, current medications, past medical history, past social history, past surgical history and problem list.    Results  No results found for this or any previous visit (from the past 1 hour(s)).]  Lab Results   Component Value Date    PSA 0.70 12/07/2023    PSA 0.6 11/25/2022     Lab Results   Component Value Date    GLUCOSE 97 10/01/2016    CALCIUM 9.8 01/08/2024     10/01/2016    K 4.8 01/08/2024    CO2 25 01/08/2024     01/08/2024    BUN 9 01/08/2024    CREATININE 0.83 01/08/2024     Lab Results   Component Value Date    WBC 5.35 01/08/2024    HGB 14.4 01/08/2024    HCT 41.6 01/08/2024    MCV 95 01/08/2024     01/08/2024       Leila Hawkins PA-C

## 2024-02-01 ENCOUNTER — OFFICE VISIT (OUTPATIENT)
Dept: UROLOGY | Facility: CLINIC | Age: 59
End: 2024-02-01

## 2024-02-01 VITALS
HEART RATE: 85 BPM | OXYGEN SATURATION: 94 % | WEIGHT: 214 LBS | BODY MASS INDEX: 33.59 KG/M2 | HEIGHT: 67 IN | SYSTOLIC BLOOD PRESSURE: 130 MMHG | DIASTOLIC BLOOD PRESSURE: 85 MMHG

## 2024-02-01 DIAGNOSIS — N41.9 PROSTATITIS, UNSPECIFIED PROSTATITIS TYPE: Primary | ICD-10-CM

## 2024-02-01 DIAGNOSIS — N13.8 BPH WITH OBSTRUCTION/LOWER URINARY TRACT SYMPTOMS: ICD-10-CM

## 2024-02-01 DIAGNOSIS — N40.1 BPH WITH OBSTRUCTION/LOWER URINARY TRACT SYMPTOMS: ICD-10-CM

## 2024-02-01 PROCEDURE — 99024 POSTOP FOLLOW-UP VISIT: CPT | Performed by: PHYSICIAN ASSISTANT

## 2024-02-08 DIAGNOSIS — N41.1 CHRONIC PROSTATITIS: ICD-10-CM

## 2024-02-08 RX ORDER — ASCORBIC ACID 1000 MG
TABLET ORAL
Refills: 0 | Status: CANCELLED | OUTPATIENT
Start: 2024-02-08

## 2024-02-08 RX ORDER — DICLOFENAC POTASSIUM 50 MG/1
50 TABLET, FILM COATED ORAL 2 TIMES DAILY
Qty: 10 TABLET | Refills: 0 | Status: SHIPPED | OUTPATIENT
Start: 2024-02-08 | End: 2024-02-13

## 2024-02-08 NOTE — TELEPHONE ENCOUNTER
Patient states he is having inflammation again and thinks this helped last time. He would like to have more than 10 pills if possible or whatever the doctor thinks.     Reason for call:   [x] Refill   [] Prior Auth  [] Other:     Office:   [] PCP/Provider -   [x] Specialty/Provider - Urology     Medication: Diclofenac Potassium     Dose/Frequency: 50 mg tablet taken by mouth 2x daily     Pharmacy: [facility]/pharmacy #[id number] - [place], [place] - [phone]     Does the patient have enough for 3 days?   [] Yes   [x] No - Send as HP to POD

## 2024-02-13 DIAGNOSIS — F41.9 ANXIETY: ICD-10-CM

## 2024-02-13 RX ORDER — PAROXETINE 30 MG/1
TABLET, FILM COATED ORAL
Qty: 180 TABLET | Refills: 3 | Status: SHIPPED | OUTPATIENT
Start: 2024-02-13

## 2024-03-01 ENCOUNTER — OFFICE VISIT (OUTPATIENT)
Dept: UROLOGY | Facility: CLINIC | Age: 59
End: 2024-03-01
Payer: COMMERCIAL

## 2024-03-01 VITALS
DIASTOLIC BLOOD PRESSURE: 84 MMHG | WEIGHT: 223.6 LBS | HEIGHT: 67 IN | SYSTOLIC BLOOD PRESSURE: 120 MMHG | OXYGEN SATURATION: 99 % | RESPIRATION RATE: 20 BRPM | BODY MASS INDEX: 35.09 KG/M2 | TEMPERATURE: 97.4 F | HEART RATE: 83 BPM

## 2024-03-01 DIAGNOSIS — N40.1 BPH WITH OBSTRUCTION/LOWER URINARY TRACT SYMPTOMS: Primary | ICD-10-CM

## 2024-03-01 DIAGNOSIS — N13.8 BPH WITH OBSTRUCTION/LOWER URINARY TRACT SYMPTOMS: Primary | ICD-10-CM

## 2024-03-01 PROCEDURE — 99214 OFFICE O/P EST MOD 30 MIN: CPT | Performed by: UROLOGY

## 2024-03-01 PROCEDURE — 81003 URINALYSIS AUTO W/O SCOPE: CPT | Performed by: UROLOGY

## 2024-03-01 RX ORDER — TADALAFIL 5 MG/1
5 TABLET ORAL DAILY PRN
Qty: 90 TABLET | Refills: 3 | Status: SHIPPED | OUTPATIENT
Start: 2024-03-01

## 2024-03-01 RX ORDER — TADALAFIL 5 MG/1
5 TABLET ORAL DAILY PRN
Qty: 90 TABLET | Refills: 3 | Status: SHIPPED | OUTPATIENT
Start: 2024-03-01 | End: 2024-03-01

## 2024-03-01 NOTE — PROGRESS NOTES
UROLOGY PROGRESS NOTE         NAME: Cristhian Lee  AGE: 59 y.o. SEX: male  : 1965   MRN: [id number]    DATE: 3/1/2024  TIME: 4:42 PM    Assessment and Plan      Impression:   1. BPH with obstruction/lower urinary tract symptoms  -     POCT urine dip auto non-scope  -     tadalafil (CIALIS) 5 MG tablet; Take 1 tablet (5 mg total) by mouth daily as needed (BPH)         Plan: I spoke with the patient.  He has a long history of prostatitis.  In my experience, gentleman with chronic prostatitis have a miserable postop recovery after Rezum due to pain that can last months and sometimes require steroids.  He would likely be best served with a TURP if he ultimately needs surgery.  In our discussion he wants to try daily tadalafil to see if it improves his voiding.  He uses sildenafil and will discontinue this.  He will remain on his Flomax.  At this point he wants to avoid procedures if possible.  He will be following up in [place] with our urology team there.      Chief Complaint   No chief complaint on file.    History of Present Illness     HPI: Cristhian Lee is a 59 y.o. year old male who presents with BPH and prostatitis.  Prior urology notes reviewed.  Longstanding history of prostatitis which sometimes requires Vicodin and doxycycline.  Issues previously evaluated with our group in [place].  Notes reviewed.              The following portions of the patient's history were reviewed and updated as appropriate: allergies, current medications, past family history, past medical history, past social history, past surgical history and problem list.  Past Medical History:   Diagnosis Date   • Benign localized prostatic hyperplasia with lower urinary tract symptoms (LUTS)     Last Assessed:2015   • Hypogonadism in male     last assessed:2015   • PONV (postoperative nausea and vomiting)      Past Surgical History:   Procedure Laterality Date   • APPENDECTOMY     • NOSE SURGERY     •  "OTHER SURGICAL HISTORY      testicular torsion surgery   • ID CYSTOURETHROSCOPY N/A 1/19/2024    Procedure: CYSTOSCOPY, TRUS;  Surgeon: Diego Kasper MD;  Location: [facility];  Service: Urology   • VASECTOMY      Last Assessed:4/7/2015   • VASECTOMY REVERSAL       shoulder  Review of Systems     Const: Denies chills, fever and weight loss.  CV: Denies chest pain.  Resp: Denies SOB.  GI: Denies abdominal pain, nausea and vomiting.  : Denies symptoms other than stated above.  Musculo: Denies back pain.    Objective   /84   Pulse 83   Temp (!) 97.4 °F (36.3 °C)   Resp 20   Ht 5' 7\" (1.702 m)   Wt 101 kg (223 lb 9.6 oz)   SpO2 99%   BMI 35.02 kg/m²     Physical Exam  Const: Appears healthy and well developed. No signs of acute distress present.  Resp: Respirations are regular and unlabored.   CV: Rate is regular. Rhythm is regular.  Abdomen: Abdomen is soft, nontender, and nondistended. Kidneys are not palpable.  : Not performed  Psych: Patient's attitude is cooperative. Mood is normal. Affect is normal.    Procedure   Procedures     Current Medications     Current Outpatient Medications:   •  cetirizine (ZyrTEC) 10 mg tablet, Take 10 mg by mouth daily, Disp: , Rfl:   •  Coenzyme Q10 (Co Q 10) 10 MG CAPS, Take by mouth, Disp: , Rfl:   •  ergocalciferol (VITAMIN D2) 50,000 units, Take 1 capsule (50,000 Units total) by mouth once a week, Disp: 12 capsule, Rfl: 1  •  loratadine (CLARITIN) 10 mg tablet, Take 10 mg by mouth daily, Disp: , Rfl:   •  NON FORMULARY, Mineral mix, Disp: , Rfl:   •  Omega-3 Fatty Acids (FISH OIL PO), Take by mouth, Disp: , Rfl:   •  PARoxetine (PAXIL) 30 mg tablet, TAKE 2 TABLETS DAILY, Disp: 180 tablet, Rfl: 3  •  tadalafil (CIALIS) 5 MG tablet, Take 1 tablet (5 mg total) by mouth daily as needed (BPH), Disp: 90 tablet, Rfl: 3  •  tamsulosin (FLOMAX) 0.4 mg, Take 2 capsules (0.8 mg total) by mouth daily with dinner, Disp: 180 capsule, Rfl: 1  •  diclofenac potassium " (CATAFLAM) 50 mg tablet, Take 1 tablet (50 mg total) by mouth 2 (two) times a day for 5 days, Disp: 10 tablet, Rfl: 0  •  phenazopyridine (PYRIDIUM) 100 mg tablet, Take 1 tablet (100 mg total) by mouth 3 (three) times a day as needed for bladder spasms (Patient not taking: Reported on 1/18/2024), Disp: 10 tablet, Rfl: 0  •  rosuvastatin (CRESTOR) 5 mg tablet, Take 1 tablet (5 mg total) by mouth daily (Patient not taking: Reported on 1/18/2024), Disp: 30 tablet, Rfl: 3        Olegario Tolbert MD

## 2024-03-03 ENCOUNTER — NURSE TRIAGE (OUTPATIENT)
Dept: OTHER | Facility: OTHER | Age: 59
End: 2024-03-03

## 2024-03-03 DIAGNOSIS — N41.1 CHRONIC PROSTATITIS: ICD-10-CM

## 2024-03-03 RX ORDER — DICLOFENAC POTASSIUM 50 MG/1
50 TABLET, FILM COATED ORAL 2 TIMES DAILY
Qty: 60 TABLET | Refills: 0 | Status: SHIPPED | OUTPATIENT
Start: 2024-03-03 | End: 2024-04-02

## 2024-03-03 NOTE — TELEPHONE ENCOUNTER
"Reason for Disposition  • [1] Prescription refill request for ESSENTIAL medicine (i.e., likelihood of harm to patient if not taken) AND [2] triager unable to refill per department policy    Answer Assessment - Initial Assessment Questions  1. DRUG NAME: \"What medicine do you need to have refilled?\"      Diclofenac Potassium (Cataflam) 50mg - 5 days at a time    2. REFILLS REMAINING: \"How many refills are remaining?\" (Note: The label on the medicine or pill bottle will show how many refills are remaining. If there are no refills remaining, then a renewal may be needed.)      0    3. EXPIRATION DATE: \"What is the expiration date?\" (Note: The label states when the prescription will , and thus can no longer be refilled.)      Denies    4. PRESCRIBING HCP: \"Who prescribed it?\" Reason: If prescribed by specialist, call should be referred to that group.      Urology    5. SYMPTOMS: \"Do you have any symptoms?\"      Patient states he took a long drive and was sitting on his prostate the entire way and is having a lot of pain. Pain 7-8/10. States that he is scheduled to have surgery, but needs medication to hold him over until then.    Protocols used: Medication Refill and Renewal Call-ADULT-    "

## 2024-03-03 NOTE — TELEPHONE ENCOUNTER
"Regarding: med refill  ----- Message from Kaylan Browne sent at 3/3/2024  9:00 AM EST -----  \"I would like to get a refill on my diclofenac potassium (CATAFLAM) 50 mg tablet. I took a long drive back and forth and I am in a lot of pain.\"    "

## 2024-03-04 NOTE — TELEPHONE ENCOUNTER
Called and spoke to the PT. Let PT know medications was sent to [facility] Pharmacy. PT confirmed Pharmacy and stated PT's wife will pick it up today.

## 2024-05-21 DIAGNOSIS — J32.0 CHRONIC MAXILLARY SINUSITIS: Primary | ICD-10-CM

## 2024-05-21 RX ORDER — PREDNISONE 20 MG/1
TABLET ORAL
Qty: 18 TABLET | Refills: 0 | Status: SHIPPED | OUTPATIENT
Start: 2024-05-21 | End: 2024-05-30

## 2024-06-09 DIAGNOSIS — N13.8 BPH WITH OBSTRUCTION/LOWER URINARY TRACT SYMPTOMS: ICD-10-CM

## 2024-06-09 DIAGNOSIS — N40.1 BPH WITH OBSTRUCTION/LOWER URINARY TRACT SYMPTOMS: ICD-10-CM

## 2024-06-10 DIAGNOSIS — N13.8 BPH WITH OBSTRUCTION/LOWER URINARY TRACT SYMPTOMS: ICD-10-CM

## 2024-06-10 DIAGNOSIS — N40.1 BPH WITH OBSTRUCTION/LOWER URINARY TRACT SYMPTOMS: ICD-10-CM

## 2024-06-10 RX ORDER — TAMSULOSIN HYDROCHLORIDE 0.4 MG/1
0.8 CAPSULE ORAL
Qty: 28 CAPSULE | Refills: 0 | Status: SHIPPED | OUTPATIENT
Start: 2024-06-10 | End: 2024-06-12 | Stop reason: SDUPTHER

## 2024-06-10 RX ORDER — TAMSULOSIN HYDROCHLORIDE 0.4 MG/1
0.8 CAPSULE ORAL
Qty: 180 CAPSULE | Refills: 1 | Status: SHIPPED | OUTPATIENT
Start: 2024-06-10 | End: 2024-06-10 | Stop reason: SDUPTHER

## 2024-06-12 DIAGNOSIS — N40.1 BPH WITH OBSTRUCTION/LOWER URINARY TRACT SYMPTOMS: ICD-10-CM

## 2024-06-12 DIAGNOSIS — N13.8 BPH WITH OBSTRUCTION/LOWER URINARY TRACT SYMPTOMS: ICD-10-CM

## 2024-06-12 RX ORDER — TAMSULOSIN HYDROCHLORIDE 0.4 MG/1
0.8 CAPSULE ORAL
Qty: 90 CAPSULE | Refills: 1 | Status: SHIPPED | OUTPATIENT
Start: 2024-06-12

## 2024-06-16 DIAGNOSIS — E55.9 VITAMIN D DEFICIENCY: ICD-10-CM

## 2024-06-17 ENCOUNTER — OFFICE VISIT (OUTPATIENT)
Dept: OBGYN CLINIC | Facility: CLINIC | Age: 59
End: 2024-06-17
Payer: COMMERCIAL

## 2024-06-17 ENCOUNTER — APPOINTMENT (OUTPATIENT)
Dept: RADIOLOGY | Facility: CLINIC | Age: 59
End: 2024-06-17
Payer: COMMERCIAL

## 2024-06-17 VITALS
WEIGHT: 223 LBS | SYSTOLIC BLOOD PRESSURE: 114 MMHG | HEIGHT: 67 IN | DIASTOLIC BLOOD PRESSURE: 76 MMHG | HEART RATE: 86 BPM | BODY MASS INDEX: 35 KG/M2

## 2024-06-17 DIAGNOSIS — M25.562 ACUTE PAIN OF LEFT KNEE: ICD-10-CM

## 2024-06-17 DIAGNOSIS — M25.562 LEFT KNEE PAIN, UNSPECIFIED CHRONICITY: ICD-10-CM

## 2024-06-17 DIAGNOSIS — M23.92 ACUTE INTERNAL DERANGEMENT OF LEFT KNEE: Primary | ICD-10-CM

## 2024-06-17 PROCEDURE — 73564 X-RAY EXAM KNEE 4 OR MORE: CPT

## 2024-06-17 PROCEDURE — 99214 OFFICE O/P EST MOD 30 MIN: CPT | Performed by: ORTHOPAEDIC SURGERY

## 2024-06-17 RX ORDER — ERGOCALCIFEROL 1.25 MG/1
50000 CAPSULE ORAL WEEKLY
Qty: 12 CAPSULE | Refills: 3 | Status: SHIPPED | OUTPATIENT
Start: 2024-06-17

## 2024-06-17 RX ORDER — IBUPROFEN 800 MG/1
800 TABLET ORAL 3 TIMES DAILY
COMMUNITY

## 2024-06-17 NOTE — PROGRESS NOTES
Patient Name:  Cristhian Lee  MRN:  [id number]    Assessment & Plan     1. Acute internal derangement of left knee  -     MRI knee left  wo contrast; Future; Expected date: 06/17/2024  2. Acute pain of left knee  -     XR knee 4+ vw left injury; Future; Expected date: 06/17/2024  -     MRI knee left  wo contrast; Future; Expected date: 06/17/2024      Left knee internal derangement  X-rays reviewed in office today with patient  Due to the patient's pain with twisting, normal x-rays, mechanical symptoms, and positive special testing a left knee MRI study was ordered to evaluate for suspected medial meniscus tear to be used in possible preoperative planning.  Gentle range of motion exercises at home  Avoid running, cutting, pivoting, twisting activity  OTC analgesics as needed for symptom management  Follow up after MRI study for further treatment planning    Chief Complaint     Left knee pain    History of the Present Illness     Cristhian Lee is a 59 y.o. male with Left knee pain for approximately 4 days without injury. The patient admits he took a few weeks off from running and then began again last week and noticed increase in pain. The patient normally runs 3x a week, about 3 miles a time. He has increase in pain while he was in the shower last night. He has pain while twisting or weight bearing. He is ambulating with crutches. He denies mechanical locking. The patient admits some clicking and catching. Pain is managed with aleve or 800 mg ibuprofen. He is an  for work.    Review of Systems     Review of Systems   Constitutional:  Negative for chills and fever.   HENT:  Negative for ear pain and sore throat.    Eyes:  Negative for pain and visual disturbance.   Respiratory:  Negative for cough and shortness of breath.    Cardiovascular:  Negative for chest pain and palpitations.   Gastrointestinal:  Negative for abdominal pain and vomiting.   Genitourinary:  Negative for dysuria and  "hematuria.   Musculoskeletal:  Negative for arthralgias and back pain.   Skin:  Negative for color change and rash.   Neurological:  Negative for seizures and syncope.   All other systems reviewed and are negative.      Physical Exam     /76   Pulse 86   Ht 5' 7\" (1.702 m)   Wt 101 kg (223 lb)   BMI 34.93 kg/m²     Left Knee  Range of motion from 0 to 130 with pain.    There is no crepitus with range of motion.   There is trace effusion.    There is mild tenderness over the posteromedial joint line.    There is 5/5 quadriceps strength and equal tone.    The patient is able to perform a straight leg raise.      positive  patellar grind test.  Anterior drawer tests is negative  negative Lachman Test.   Posterior drawer test is   negative   Varus stress testing reveals no instability at 0 and 30 degrees   Valgus stress testing reveals no instability at 0 and 30 degrees  Edgar's testing is positive    The patient is neurovascular intact distally.      Eyes:  Anicteric sclerae.  Neck:  Supple.  Lungs:  Normal respiratory effort.  Cardiovascular:  Capillary refill is less than 2 seconds.  Skin:  Intact without erythema.  Neurologic:  Sensation grossly intact to light touch.  Psychiatric:  Mood and affect are appropriate.    Data Review     I have personally reviewed pertinent films in PACS, and my interpretation follows:    X-rays taken 6/17/2024 of Left knee independently reviewed and demonstrate well preserved joint spaces. No acute fracture or dislocation.    Past Medical History:   Diagnosis Date    Benign localized prostatic hyperplasia with lower urinary tract symptoms (LUTS)     Last Assessed:8/25/2015    Hypogonadism in male     last assessed:8/25/2015    PONV (postoperative nausea and vomiting)        Past Surgical History:   Procedure Laterality Date    APPENDECTOMY      NOSE SURGERY  2020    OTHER SURGICAL HISTORY      testicular torsion surgery    ID CYSTOURETHROSCOPY N/A 1/19/2024    Procedure: " CYSTOSCOPY, TRUS;  Surgeon: Diego Kasper MD;  [facility];  Service: Urology    VASECTOMY      Last Assessed:4/7/2015    VASECTOMY REVERSAL         Allergies   Allergen Reactions    Meperidine Irritability     Increase pain, anger, irritability    Quinolones     Sulfamethoxazole-Trimethoprim Other (See Comments)       Current Outpatient Medications on File Prior to Visit   Medication Sig Dispense Refill    cetirizine (ZyrTEC) 10 mg tablet Take 10 mg by mouth daily      Coenzyme Q10 (Co Q 10) 10 MG CAPS Take by mouth      ergocalciferol (VITAMIN D2) 50,000 units TAKE 1 CAPSULE ONCE A WEEK 12 capsule 3    ibuprofen (MOTRIN) 800 mg tablet Take 800 mg by mouth 3 (three) times a day      loratadine (CLARITIN) 10 mg tablet Take 10 mg by mouth daily      NON FORMULARY Mineral mix      Omega-3 Fatty Acids (FISH OIL PO) Take by mouth      PARoxetine (PAXIL) 30 mg tablet TAKE 2 TABLETS DAILY 180 tablet 3    tadalafil (CIALIS) 5 MG tablet Take 1 tablet (5 mg total) by mouth daily as needed (BPH) 90 tablet 3    tamsulosin (FLOMAX) 0.4 mg Take 2 capsules (0.8 mg total) by mouth daily with dinner 90 capsule 1    diclofenac potassium (CATAFLAM) 50 mg tablet Take 1 tablet (50 mg total) by mouth 2 (two) times a day 60 tablet 0    phenazopyridine (PYRIDIUM) 100 mg tablet Take 1 tablet (100 mg total) by mouth 3 (three) times a day as needed for bladder spasms (Patient not taking: Reported on 1/18/2024) 10 tablet 0    rosuvastatin (CRESTOR) 5 mg tablet Take 1 tablet (5 mg total) by mouth daily (Patient not taking: Reported on 1/18/2024) 30 tablet 3    [DISCONTINUED] ergocalciferol (VITAMIN D2) 50,000 units Take 1 capsule (50,000 Units total) by mouth once a week 12 capsule 1     No current facility-administered medications on file prior to visit.       Social History     Tobacco Use    Smoking status: Former     Passive exposure: Past    Smokeless tobacco: Never   Vaping Use    Vaping status: Never Used   Substance Use  Topics    Alcohol use: No    Drug use: Never       Family History   Problem Relation Age of Onset    Glaucoma Mother     Substance Abuse Neg Hx     Mental illness Neg Hx              Procedures Performed     Procedures  None.      Steve Fletcher DO  Scribe Attestation      I,:  Blanca Joyner am acting as a scribe while in the presence of the attending physician.:       I,:  Steve Fletcher DO personally performed the services described in this documentation    as scribed in my presence.:

## 2024-06-24 ENCOUNTER — HOSPITAL ENCOUNTER (OUTPATIENT)
Dept: MRI IMAGING | Facility: HOSPITAL | Age: 59
Discharge: HOME | End: 2024-06-24
Payer: COMMERCIAL

## 2024-06-24 DIAGNOSIS — M25.562 ACUTE PAIN OF LEFT KNEE: ICD-10-CM

## 2024-06-24 DIAGNOSIS — M23.92 ACUTE INTERNAL DERANGEMENT OF LEFT KNEE: ICD-10-CM

## 2024-06-24 PROCEDURE — 73721 MRI JNT OF LWR EXTRE W/O DYE: CPT

## 2024-06-25 ENCOUNTER — TELEPHONE (OUTPATIENT)
Age: 59
End: 2024-06-25

## 2024-06-25 NOTE — TELEPHONE ENCOUNTER
Caller: Pat, spouse     Doctor: Justine    Reason for call: Asking if the patient can see another doctor to go over MRI results, since the doctor is away, and can he return to work, based on the imaging results.  Please advise     Call back#: [phone]

## 2024-07-03 ENCOUNTER — TELEPHONE (OUTPATIENT)
Dept: OBGYN CLINIC | Facility: CLINIC | Age: 59
End: 2024-07-03

## 2024-07-03 ENCOUNTER — TELEPHONE (OUTPATIENT)
Age: 59
End: 2024-07-03

## 2024-07-03 NOTE — TELEPHONE ENCOUNTER
Caller: Candido    Doctor: Justine    Reason for call: Patient returning call about Friday or Monday appointment. Patient is available Friday. There where no slots left. The patient will be there for 9 Am if this doesn't work please call the patient with a better time for the Dr.    Call back#: [phone]

## 2024-07-05 ENCOUNTER — OFFICE VISIT (OUTPATIENT)
Dept: OBGYN CLINIC | Facility: CLINIC | Age: 59
End: 2024-07-05
Payer: COMMERCIAL

## 2024-07-05 VITALS
DIASTOLIC BLOOD PRESSURE: 77 MMHG | WEIGHT: 223 LBS | SYSTOLIC BLOOD PRESSURE: 119 MMHG | HEART RATE: 80 BPM | BODY MASS INDEX: 35 KG/M2 | HEIGHT: 67 IN

## 2024-07-05 DIAGNOSIS — M17.12 PRIMARY OSTEOARTHRITIS OF LEFT KNEE: ICD-10-CM

## 2024-07-05 DIAGNOSIS — S83.242D OTHER TEAR OF MEDIAL MENISCUS OF LEFT KNEE AS CURRENT INJURY, SUBSEQUENT ENCOUNTER: Primary | ICD-10-CM

## 2024-07-05 PROCEDURE — 99214 OFFICE O/P EST MOD 30 MIN: CPT | Performed by: ORTHOPAEDIC SURGERY

## 2024-07-05 NOTE — PROGRESS NOTES
Patient Name:  Cristhian Lee  MRN:  [id number]    Assessment & Plan     1. Other tear of medial meniscus of left knee as current injury, subsequent encounter  -     Durable Medical Equipment  2. Primary osteoarthritis of left knee  -     Durable Medical Equipment  -     Injection Procedure Prior Authorization; Future    Left knee medial meniscus posterior root tear  MRI reviewed in office with patient   Discussed in detail the anatomy and function of the native meniscus and the disruption of mechanical function after meniscal root tear.    Non operative treatment was discussed in the form of activity modification, oral analgesics, injection therapy, formal physical therapy, home exercise program, and bracing. Risks of continued non operative management include persistent pain, decreased function, and rapid progression of osteoarthritis.    Surgical intervention was also discussed in the form of Left knee arthroscopy with medial meniscus root repair.  Due to the nature of his injury and well-preserved articular surfaces, I recommended surgical intervention.  Risks of surgery, including but not limited to, persistent pain, recurrent tearing, inability to perform a repair due to poor meniscal tissue or more significant degenerative change than is identified on preoperative imaging, progressive osteoarthritis, blood clots, postoperative stiffness, infection, anesthesia complications, and need for subsequent surgery progression of osteoarthritis.   Discussed postoperative protocol including 6 weeks of maintaining nonweightbearing restrictions, post operative immobilization, post op protocol with outpatient PT, return to unrestricted duty.  At this time, patient is concerned about finances and inability to take off work. He would look to move forward with surgery in January. Advised patient postponing meniscus root repair until January could lead to interval worsening of the tissue and more rapid onset degenerative  "change limiting ability to perform repair.  At this time, will move forward with medial  brace and consideration of CSI vs Visco injection. Also discussed PRP injections, but patient not interested as it is not covered by insurance.   He will follow up in office once visco injection available for administration  Placed order for medial  brace. Patient bypassed preauthorization for the brace and was advised this may lead to increased cost for the brace.      History of the Present Illness   Cristhian Lee is a 59 y.o. male with Left knee internal derangement. Today, patient reports he is a little better since last appointment and no longer is using crutches. He admits to persistent pain, not relieved with OTC NSAIDs or topical CBD ointment. He is currently using neoprene sleeve for support. He admits he is still working.          Review of Systems     Review of Systems   Constitutional:  Negative for chills and fever.   HENT:  Negative for ear pain and sore throat.    Eyes:  Negative for pain and visual disturbance.   Respiratory:  Negative for cough and shortness of breath.    Cardiovascular:  Negative for chest pain and palpitations.   Gastrointestinal:  Negative for abdominal pain and vomiting.   Genitourinary:  Negative for dysuria and hematuria.   Musculoskeletal:  Negative for arthralgias and back pain.   Skin:  Negative for color change and rash.   Neurological:  Negative for seizures and syncope.   All other systems reviewed and are negative.      Physical Exam     /77   Pulse 80   Ht 5' 7\" (1.702 m)   Wt 101 kg (223 lb)   BMI 34.93 kg/m²     Left Knee  Range of motion from 0 to 130 degrees without pain at terminal flexion.    There is no effusion.    There is mild tenderness over the posteromedial joint line.    The patient is able to perform a straight leg raise with 4+/5 quad strength.    Varus stress testing reveals no pain or instability at 0 and 30 degrees   Valgus stress " testing reveals no pain or instability at 0 and 30 degrees  The patient is neurovascular intact distally.      Data Review     I have personally reviewed pertinent films in PACS, and my interpretation follows.    X-rays taken 6/17/2024 of Left knee independently reviewed and demonstrate well preserved joint spaces. No acute fracture or dislocation.     MRI performed 06/24/2024 of Left knee demonstrates medial meniscus posterior root tear, mild medial compartment degenerative changes with partial thickness cartilage loss.     Social History     Tobacco Use    Smoking status: Former     Passive exposure: Past    Smokeless tobacco: Never   Vaping Use    Vaping status: Never Used   Substance Use Topics    Alcohol use: No    Drug use: Never           Procedures  None     Martha Morley PA-C

## 2024-07-16 ENCOUNTER — TELEPHONE (OUTPATIENT)
Age: 59
End: 2024-07-16

## 2024-07-16 NOTE — TELEPHONE ENCOUNTER
CALLED AND SPOKE TO THE PATIENT WIFE, REID REGARDING THE STATUS OF VISCO INJECTION FOR PATIENT. I EXPLAINED WE RECEIVED THE APPROVAL BUT WE ARE WAITING FOR THE INJECTION TO ARRIVE HERE IN OUR OFFICE TO SCHEDULE THE PATIENT. WE WILL FOLLOW UP END OF THIS WEEK WITH THE SPECIALTY PHARMACY FOR THE STATUS OF THE MEDICATION. PER PT WIFE, PT HAS APPT SCHEDULED FOR FOLLOW UP ON 7/30/2024.

## 2024-07-16 NOTE — TELEPHONE ENCOUNTER
Caller: Pat - Wife    Doctor: Justine    Reason for call: Calling to check on the status of the gel injections    Call back#: [phone]

## 2024-07-24 ENCOUNTER — TELEPHONE (OUTPATIENT)
Age: 59
End: 2024-07-24

## 2024-07-24 NOTE — TELEPHONE ENCOUNTER
Caller: Spouse/Pat    Doctor: Skip    Reason for call: Questioned if the patient could receive his visco at the next appt 7/30? Then a Friday only appt was requested. Asked if Martha could give the injection on a Friday? Please cb to discuss    Call back#: [phone]

## 2024-07-24 NOTE — TELEPHONE ENCOUNTER
Spoke with the patient's wife. He switched his appointment for a visco injection from 7/30 to 7/29 with Martha.

## 2024-07-29 ENCOUNTER — PROCEDURE VISIT (OUTPATIENT)
Dept: OBGYN CLINIC | Facility: CLINIC | Age: 59
End: 2024-07-29
Payer: COMMERCIAL

## 2024-07-29 VITALS
HEIGHT: 67 IN | WEIGHT: 223 LBS | DIASTOLIC BLOOD PRESSURE: 77 MMHG | HEART RATE: 79 BPM | SYSTOLIC BLOOD PRESSURE: 114 MMHG | BODY MASS INDEX: 35 KG/M2

## 2024-07-29 DIAGNOSIS — M17.12 PRIMARY OSTEOARTHRITIS OF LEFT KNEE: Primary | ICD-10-CM

## 2024-07-29 PROCEDURE — 20610 DRAIN/INJ JOINT/BURSA W/O US: CPT | Performed by: PHYSICIAN ASSISTANT

## 2024-07-29 NOTE — PROGRESS NOTES
Patient has failed at least three months of conservative therapy including bracing, OTC medications , patient symptoms include pain with increased function, pain is rated at 6/10.  After discussing the options for treatment, the patient elected to proceed forward with Left knee Monovisc injection to reduce pain. Risks of injection, including but not limited to, post-injection pain, swelling, pseudo septic reaction, skin rash, itching, and infection were discussed in detail.  The patient understood, had no further questions and elected to proceed forward.  After sterile preparation, the Monovisc injection was injected into the Left knee.  The patient tolerated the procedure well no complications were noted.  The patient was instructed ice and elevate the extremity, limit strenuous activity for the next 2-3 days, and to contact us if there were any questions or concerns prior to their follow-up appointment.  I will see the patient back in 3 months for reevaluation.        Large joint arthrocentesis: L knee  Universal Protocol:  Risks and benefits: risks, benefits and alternatives were discussed  Consent given by: patient  Patient identity confirmed: verbally with patient  Supporting Documentation  Indications: pain   Procedure Details  Location: knee - L knee  Needle size: 22 G  Approach: lateral  Medications administered: 88 mg hyaluronan 88 MG/4ML    Patient tolerance: patient tolerated the procedure well with no immediate complications  Dressing:  Sterile dressing applied

## 2024-08-06 ENCOUNTER — TELEPHONE (OUTPATIENT)
Age: 59
End: 2024-08-06

## 2024-08-06 NOTE — TELEPHONE ENCOUNTER
Caller: Pat - Patient Spouse    Doctor: Justine    Reason for call: Patient has seen Dr Fletcher regarding pain in the knee previously, and has recently received a visco injection to attempt to control symptoms.    They are calling to indicate the gel injection does not seen to be helping, so they are looking to possibly pursue surgery.  The patient's follow-up has been moved forward to the soonest available on 9/13 to discuss procedure and get consents, but they would like to know if there is anything they can do to get the process for that started.    Please reach out to patient spouse to advise.    Call back#: [phone]

## 2024-08-12 ENCOUNTER — TELEPHONE (OUTPATIENT)
Age: 59
End: 2024-08-12

## 2024-08-12 NOTE — TELEPHONE ENCOUNTER
Hello,    Please advise if a forced appointment can be accommodated for the patient:    Call back #: [phone]    Insurance: [employer]    Reason for appointment: Increased pain left knee/discuss/schedule sx-patient is unable to work due to pain    Requested doctor and/or location: Justine/either ASAP      Thank you.

## 2024-08-23 ENCOUNTER — OFFICE VISIT (OUTPATIENT)
Dept: OBGYN CLINIC | Facility: CLINIC | Age: 59
End: 2024-08-23
Payer: COMMERCIAL

## 2024-08-23 ENCOUNTER — APPOINTMENT (OUTPATIENT)
Dept: RADIOLOGY | Facility: CLINIC | Age: 59
End: 2024-08-23
Payer: COMMERCIAL

## 2024-08-23 VITALS
WEIGHT: 221.2 LBS | SYSTOLIC BLOOD PRESSURE: 120 MMHG | BODY MASS INDEX: 34.72 KG/M2 | HEIGHT: 67 IN | DIASTOLIC BLOOD PRESSURE: 85 MMHG | HEART RATE: 73 BPM

## 2024-08-23 DIAGNOSIS — M25.561 RIGHT KNEE PAIN, UNSPECIFIED CHRONICITY: Primary | ICD-10-CM

## 2024-08-23 DIAGNOSIS — S83.242D OTHER TEAR OF MEDIAL MENISCUS OF LEFT KNEE AS CURRENT INJURY, SUBSEQUENT ENCOUNTER: ICD-10-CM

## 2024-08-23 DIAGNOSIS — M25.561 RIGHT KNEE PAIN, UNSPECIFIED CHRONICITY: ICD-10-CM

## 2024-08-23 PROCEDURE — 73564 X-RAY EXAM KNEE 4 OR MORE: CPT

## 2024-08-23 PROCEDURE — 99214 OFFICE O/P EST MOD 30 MIN: CPT | Performed by: ORTHOPAEDIC SURGERY

## 2024-08-23 RX ORDER — HYALURONATE SODIUM, STABILIZED 88 MG/4 ML
SYRINGE (ML) INTRAARTICULAR
COMMUNITY
Start: 2024-07-15

## 2024-08-23 RX ORDER — CHLORHEXIDINE GLUCONATE ORAL RINSE 1.2 MG/ML
15 SOLUTION DENTAL ONCE
OUTPATIENT
Start: 2024-08-23 | End: 2024-08-23

## 2024-08-23 RX ORDER — CHLORHEXIDINE GLUCONATE 40 MG/ML
SOLUTION TOPICAL DAILY PRN
OUTPATIENT
Start: 2024-08-23

## 2024-08-23 NOTE — PROGRESS NOTES
Patient Name:  Cristhian Lee  MRN:  [id number]    Assessment & Plan     1. Right knee pain, unspecified chronicity  -     XR knee 4+ vw right injury; Future; Expected date: 08/23/2024  2. Other tear of medial meniscus of left knee as current injury, subsequent encounter  -     Case request operating room: REPAIR MENISCUS- Left knee medial meniscus root repair vs partial menisectomy; Standing  -     Ambulatory referral to Family Practice; Future  -     CBC and Platelet; Future  -     Comprehensive metabolic panel; Future  -     EKG 12 lead; Future  -     XR chest pa & lateral; Future; Expected date: 08/23/2024  -     Case request operating room: REPAIR MENISCUS- Left knee medial meniscus root repair vs partial menisectomy    Left knee medial meniscus posterior root tear  MRI reviewed in office with patient  Non operative treatment was discussed in the form of activity modification, oral analgesics, injection therapy, formal physical therapy, home exercise program, and bracing. Risks of continued non operative management include persistent pain, decreased function, and rapid progression of osteoarthritis.    Surgical intervention was also discussed in the form of Left knee arthroscopy with medial meniscus root repair.  Due to the nature of his injury and well-preserved articular surfaces, nature of his injury, and activity level, I have recommended surgical intervention in the form of left knee arthroscopy with medial meniscus root repair versus partial meniscectomy.  Risks of surgery, including but not limited to, persistent pain, recurrent tearing, inability to perform a repair due to poor meniscal tissue or more significant degenerative change than is identified on preoperative imaging, progressive osteoarthritis, blood clots, postoperative stiffness, infection, nerve and blood vessel injury, anesthesia complications, and need for subsequent surgery progression of osteoarthritis, including  arthroplasty.  Discussed postoperative protocol including 6 weeks of maintaining nonweightbearing restrictions with meniscal repair, post operative immobilization, post op protocol with outpatient PT, return to unrestricted duty.  All questions were answered to the patient's satisfaction  He will obtained labs and preoperative clearance from his PCP.  Surgery tentatively scheduled for 9/4/2024  He will follow up post operatively      History of the Present Illness   Cristhian Lee is a 59 y.o. male with Left knee medial meniscus posterior root tear. The patient was last seen on 7/29/2024 when he was provided left knee Monovisc injection. He has great difficulty kneeling and bending his knee and more recently has been unable to work due to the pain.  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  He believes that many years of doing this may have contributed to his current knee situation.  Pain is exacerbated at work while kneeling and bending. He has been wearing  brace all the time, but admits it doesn't really help. He cannot stay on his feet more than 2 hours due to pain. Pain is managed with Aleve for ibuprofen. He would like to discuss operative management today.   He is also complaining of right knee pain today. He denies recent injury. Pain occurs while weight bearing. He notices clicking in his right knee. Denies mechanical locking and catching.       Review of Systems     Review of Systems   Constitutional:  Negative for chills and fever.   HENT:  Negative for ear pain and sore throat.    Eyes:  Negative for pain and visual disturbance.   Respiratory:  Negative for cough and shortness of breath.    Cardiovascular:  Negative for chest pain and palpitations.   Gastrointestinal:  Negative for abdominal pain and vomiting.   Genitourinary:  Negative for dysuria and hematuria.   Musculoskeletal:  Negative for arthralgias and back pain.   Skin:  Negative for color  "change and rash.   Neurological:  Negative for seizures and syncope.   All other systems reviewed and are negative.      Physical Exam     /85   Pulse 73   Ht 5' 7\" (1.702 m)   Wt 100 kg (221 lb 3.2 oz)   BMI 34.64 kg/m²     Left Knee  Range of motion from 0 to 100 degrees with pain at terminal flexion.    There is an effusion.    There is tenderness over the posteromedial joint line, lateral joint line.    The patient is able to perform a straight leg raise with 4+/5 quad strength.    Varus stress testing reveals no pain or instability at 0 and 30 degrees   Valgus stress testing reveals no pain or instability at 0 and 30 degrees  Edgar's positive medially  The patient is neurovascular intact distally.    Right Knee  Range of motion from 0 to 125.    There is mild crepitus with range of motion.   There is no effusion.    There is tenderness over the medial joint line.    There is 5/5 quadriceps strength and equal tone.    The patient is able to perform a straight leg raise.      negative patellar grind test.  Anterior drawer tests is negative  negative Lachman Test.   Posterior drawer test is   negative   Varus stress testing reveals no instability at 0 and 30 degrees   Valgus stress testing reveals no instability at 0 and 30 degrees  Edgar's testing is negative   The patient is neurovascular intact distally.      Data Review     I have personally reviewed pertinent films in PACS, and my interpretation follows.    X-rays taken 6/17/2024 of Left knee independently reviewed and demonstrate well preserved joint spaces. No acute fracture or dislocation.     MRI performed 06/24/2024 of Left knee demonstrates medial meniscus posterior root tear, mild medial compartment degenerative changes with partial thickness cartilage loss.     X-rays of the right knee obtained 8/23/2024 demonstrate well preserved joint spaces. No acute fracture or dislocation.    Social History     Tobacco Use    Smoking status: " Former     Passive exposure: Past    Smokeless tobacco: Never   Vaping Use    Vaping status: Never Used   Substance Use Topics    Alcohol use: No    Drug use: Never           Procedures  None     Blanca Joyner   Scribe Attestation      I,:  Blanca Joyner am acting as a scribe while in the presence of the attending physician.:       I,:  Steve Fletcher, DO personally performed the services described in this documentation    as scribed in my presence.:

## 2024-08-24 ENCOUNTER — APPOINTMENT (OUTPATIENT)
Dept: LAB | Facility: CLINIC | Age: 59
End: 2024-08-24
Payer: COMMERCIAL

## 2024-08-24 DIAGNOSIS — S83.242D OTHER TEAR OF MEDIAL MENISCUS OF LEFT KNEE AS CURRENT INJURY, SUBSEQUENT ENCOUNTER: ICD-10-CM

## 2024-08-24 LAB
ALBUMIN SERPL BCG-MCNC: 4.4 G/DL (ref 3.5–5)
ALP SERPL-CCNC: 78 U/L (ref 34–104)
ALT SERPL W P-5'-P-CCNC: 19 U/L (ref 7–52)
ANION GAP SERPL CALCULATED.3IONS-SCNC: 9 MMOL/L (ref 4–13)
AST SERPL W P-5'-P-CCNC: 17 U/L (ref 13–39)
BILIRUB SERPL-MCNC: 0.55 MG/DL (ref 0.2–1)
BUN SERPL-MCNC: 14 MG/DL (ref 5–25)
CALCIUM SERPL-MCNC: 9.7 MG/DL (ref 8.4–10.2)
CHLORIDE SERPL-SCNC: 102 MMOL/L (ref 96–108)
CO2 SERPL-SCNC: 27 MMOL/L (ref 21–32)
CREAT SERPL-MCNC: 0.91 MG/DL (ref 0.6–1.3)
ERYTHROCYTE [DISTWIDTH] IN BLOOD BY AUTOMATED COUNT: 12.5 % (ref 11.6–15.1)
GFR SERPL CREATININE-BSD FRML MDRD: 91 ML/MIN/1.73SQ M
GLUCOSE P FAST SERPL-MCNC: 91 MG/DL (ref 65–99)
HCT VFR BLD AUTO: 40.8 % (ref 36.5–49.3)
HGB BLD-MCNC: 13.9 G/DL (ref 12–17)
MCH RBC QN AUTO: 33.4 PG (ref 26.8–34.3)
MCHC RBC AUTO-ENTMCNC: 34.1 G/DL (ref 31.4–37.4)
MCV RBC AUTO: 98 FL (ref 82–98)
PLATELET # BLD AUTO: 280 THOUSANDS/UL (ref 149–390)
PMV BLD AUTO: 9.6 FL (ref 8.9–12.7)
POTASSIUM SERPL-SCNC: 4.2 MMOL/L (ref 3.5–5.3)
PROT SERPL-MCNC: 6.8 G/DL (ref 6.4–8.4)
RBC # BLD AUTO: 4.16 MILLION/UL (ref 3.88–5.62)
SODIUM SERPL-SCNC: 138 MMOL/L (ref 135–147)
WBC # BLD AUTO: 4.19 THOUSAND/UL (ref 4.31–10.16)

## 2024-08-24 PROCEDURE — 36415 COLL VENOUS BLD VENIPUNCTURE: CPT

## 2024-08-24 PROCEDURE — 85027 COMPLETE CBC AUTOMATED: CPT

## 2024-08-24 PROCEDURE — 80053 COMPREHEN METABOLIC PANEL: CPT

## 2024-08-26 ENCOUNTER — APPOINTMENT (OUTPATIENT)
Dept: RADIOLOGY | Facility: CLINIC | Age: 59
End: 2024-08-26
Payer: COMMERCIAL

## 2024-08-26 ENCOUNTER — TELEPHONE (OUTPATIENT)
Dept: OBGYN CLINIC | Facility: CLINIC | Age: 59
End: 2024-08-26

## 2024-08-26 ENCOUNTER — OFFICE VISIT (OUTPATIENT)
Dept: FAMILY MEDICINE CLINIC | Facility: CLINIC | Age: 59
End: 2024-08-26
Payer: COMMERCIAL

## 2024-08-26 VITALS
SYSTOLIC BLOOD PRESSURE: 110 MMHG | TEMPERATURE: 99 F | DIASTOLIC BLOOD PRESSURE: 80 MMHG | HEART RATE: 91 BPM | WEIGHT: 220 LBS | HEIGHT: 67 IN | OXYGEN SATURATION: 97 % | BODY MASS INDEX: 34.53 KG/M2

## 2024-08-26 DIAGNOSIS — Z01.818 PRE-OPERATIVE CLEARANCE: ICD-10-CM

## 2024-08-26 DIAGNOSIS — S83.242D OTHER TEAR OF MEDIAL MENISCUS OF LEFT KNEE AS CURRENT INJURY, SUBSEQUENT ENCOUNTER: Primary | ICD-10-CM

## 2024-08-26 DIAGNOSIS — S83.242D OTHER TEAR OF MEDIAL MENISCUS OF LEFT KNEE AS CURRENT INJURY, SUBSEQUENT ENCOUNTER: ICD-10-CM

## 2024-08-26 PROCEDURE — 99214 OFFICE O/P EST MOD 30 MIN: CPT | Performed by: NURSE PRACTITIONER

## 2024-08-26 PROCEDURE — 71046 X-RAY EXAM CHEST 2 VIEWS: CPT

## 2024-08-26 PROCEDURE — 93000 ELECTROCARDIOGRAM COMPLETE: CPT | Performed by: NURSE PRACTITIONER

## 2024-08-26 NOTE — PROGRESS NOTES
Cristhian Lee 1965 male MRN: [id number]      ASSESSMENT/PLAN  Problem List Items Addressed This Visit        Musculoskeletal and Integument    Other tear of medial meniscus of left knee as current injury, subsequent encounter - Primary     Patient has upcoming surgery scheduled in 9/4/2024 and is cleared for his surgery results of the EKG and labs reviewed and was normal             Surgery/Wound/Pain    Pre-operative clearance    Relevant Orders    POCT ECG (Completed)       High Risk Surgery: no  CAD: no  CHF: no  CVD: no  DM2 on insulin: no  Cr>2: no        Cristhian Lee is undergoing a Low Risk surgery. He is at Low Risk for major adverse cardiac event (MACE). He may not proceed with surgery as planned without further workup.    Future Appointments   Date Time Provider Department Center   9/16/2024 10:15 AM DO HANNAH Sloan [facility]   11/19/2024  4:20 PM KAREL Sharma  [facility]          SUBJECTIVE  CC: Pre-op Exam (REPAIR MENISCUS- Left knee medial meniscus root repair vs partial menisectomy (Left: Knee). 9/4/24 w/Justine)      HPI:  Cristhian Lee is a 59 y.o. male who is planning to undergo Left knee meniscus tear repair  under general by Dr. Fletcher on 9/4/2024.  Patient has not had complications with anesthesia in the past.  Functional status: full    Review of Systems   Constitutional:  Negative for activity change, appetite change, chills, diaphoresis, fatigue, fever and unexpected weight change.   HENT:  Positive for congestion. Negative for ear pain, hearing loss, postnasal drip, sinus pressure, sinus pain, sneezing and sore throat.    Eyes:  Negative for pain, redness and visual disturbance.   Respiratory:  Negative for cough and shortness of breath.    Cardiovascular:  Negative for chest pain, palpitations and leg swelling.   Gastrointestinal:  Negative for abdominal pain, diarrhea, nausea and vomiting.   Endocrine: Negative.     Genitourinary: Negative.    Musculoskeletal:  Positive for arthralgias and gait problem.   Skin: Negative.    Allergic/Immunologic: Negative.    Neurological:  Negative for dizziness and light-headedness.   Hematological: Negative.    Psychiatric/Behavioral:  Negative for behavioral problems and dysphoric mood.        Historical Information   The patient history was reviewed and updated as follows:    Past Medical History:   Diagnosis Date   • Benign localized prostatic hyperplasia with lower urinary tract symptoms (LUTS)     Last Assessed:8/25/2015   • Hypogonadism in male     last assessed:8/25/2015   • PONV (postoperative nausea and vomiting)      Past Surgical History:   Procedure Laterality Date   • APPENDECTOMY     • NOSE SURGERY  2020   • OTHER SURGICAL HISTORY      testicular torsion surgery   • OK CYSTOURETHROSCOPY N/A 1/19/2024    Procedure: CYSTOSCOPY, TRUS;  Surgeon: Diego Kasper MD;  Location: [facility] MA[facility] OR;  Service: Urology   • VASECTOMY      Last Assessed:4/7/2015   • VASECTOMY REVERSAL       Family History   Problem Relation Age of Onset   • Glaucoma Mother    • Substance Abuse Neg Hx    • Mental illness Neg Hx       Social History   Social History     Substance and Sexual Activity   Alcohol Use No     Social History     Substance and Sexual Activity   Drug Use Never     Social History     Tobacco Use   Smoking Status Former   • Passive exposure: Past   Smokeless Tobacco Never       Medications:     Current Outpatient Medications:   •  cetirizine (ZyrTEC) 10 mg tablet, Take 10 mg by mouth daily, Disp: , Rfl:   •  Coenzyme Q10 (Co Q 10) 10 MG CAPS, Take by mouth, Disp: , Rfl:   •  diclofenac potassium (CATAFLAM) 50 mg tablet, Take 1 tablet (50 mg total) by mouth 2 (two) times a day, Disp: 60 tablet, Rfl: 0  •  ergocalciferol (VITAMIN D2) 50,000 units, TAKE 1 CAPSULE ONCE A WEEK, Disp: 12 capsule, Rfl: 3  •  ibuprofen (MOTRIN) 800 mg tablet, Take 800 mg by mouth 3 (three) times a day, Disp: ,  "Rfl:   •  loratadine (CLARITIN) 10 mg tablet, Take 10 mg by mouth daily, Disp: , Rfl:   •  Monovisc injection, , Disp: , Rfl:   •  NON FORMULARY, Mineral mix, Disp: , Rfl:   •  Omega-3 Fatty Acids (FISH OIL PO), Take by mouth, Disp: , Rfl:   •  PARoxetine (PAXIL) 30 mg tablet, TAKE 2 TABLETS DAILY, Disp: 180 tablet, Rfl: 3  •  phenazopyridine (PYRIDIUM) 100 mg tablet, Take 1 tablet (100 mg total) by mouth 3 (three) times a day as needed for bladder spasms, Disp: 10 tablet, Rfl: 0  •  rosuvastatin (CRESTOR) 5 mg tablet, Take 1 tablet (5 mg total) by mouth daily, Disp: 30 tablet, Rfl: 3  •  tadalafil (CIALIS) 5 MG tablet, Take 1 tablet (5 mg total) by mouth daily as needed (BPH), Disp: 90 tablet, Rfl: 3  •  tamsulosin (FLOMAX) 0.4 mg, Take 2 capsules (0.8 mg total) by mouth daily with dinner, Disp: 90 capsule, Rfl: 1  Allergies   Allergen Reactions   • Meperidine Irritability     Increase pain, anger, irritability   • Quinolones    • Sulfamethoxazole-Trimethoprim Other (See Comments)       OBJECTIVE    Vitals:   Vitals:    08/26/24 1014   BP: 110/80   BP Location: Left arm   Patient Position: Sitting   Cuff Size: Large   Pulse: 91   Temp: 99 °F (37.2 °C)   SpO2: 97%   Weight: 99.8 kg (220 lb)   Height: 5' 7\" (1.702 m)           Physical Exam  Vitals and nursing note reviewed.   Constitutional:       General: He is not in acute distress.     Appearance: He is well-developed.   HENT:      Head: Normocephalic and atraumatic.      Right Ear: Tympanic membrane normal.      Left Ear: Tympanic membrane normal.      Nose: Nose normal.      Mouth/Throat:      Mouth: Mucous membranes are moist.   Eyes:      Pupils: Pupils are equal, round, and reactive to light.   Neck:      Thyroid: No thyromegaly.   Cardiovascular:      Rate and Rhythm: Normal rate and regular rhythm.      Heart sounds: Normal heart sounds. No murmur heard.  Pulmonary:      Effort: Pulmonary effort is normal. No respiratory distress.      Breath sounds: " Normal breath sounds. No wheezing.   Abdominal:      General: Bowel sounds are normal.      Palpations: Abdomen is soft.   Musculoskeletal:      Cervical back: Normal range of motion.      Left knee: Swelling and crepitus present. Decreased range of motion. Tenderness present over the medial joint line and lateral joint line.   Skin:     General: Skin is warm and dry.   Neurological:      Mental Status: He is alert and oriented to person, place, and time.   Psychiatric:         Mood and Affect: Mood and affect normal.                    KAREL Sharma  [facility]   8/26/2024  10:39 AM

## 2024-08-26 NOTE — ASSESSMENT & PLAN NOTE
Patient has upcoming surgery scheduled in 9/4/2024 and is cleared for his surgery results of the EKG and labs reviewed and was normal

## 2024-08-26 NOTE — TELEPHONE ENCOUNTER
Called pt to remind him that he also has to get a pre-op CXR and EKG done. He did get his pre-op LABS done.  He is scheduled to see his PCP today for clearance.

## 2024-08-29 NOTE — PRE-PROCEDURE INSTRUCTIONS
Pre-Surgery Instructions:   Medication Instructions    cetirizine (ZyrTEC) 10 mg tablet PRN-Take as directed    Coenzyme Q10 (Co Q 10) 10 MG CAPS Stop taking 7 days prior to surgery.    ergocalciferol (VITAMIN D2) 50,000 units Stop taking 7 days prior to surgery.    ibuprofen (MOTRIN) 800 mg tablet Stop taking 7 days prior to surgery.    loratadine (CLARITIN) 10 mg tablet PRN-Take as directed    NON FORMULARY Stop taking 7 days prior to surgery.    Omega-3 Fatty Acids (FISH OIL PO) Stop taking 7 days prior to surgery.    PARoxetine (PAXIL) 30 mg tablet Take as directed    tadalafil (CIALIS) 5 MG tablet Take as directed for BPH    tamsulosin (FLOMAX) 0.4 mg Take as directed        Medication instructions for day surgery reviewed. Please use only a sip of water to take your instructed medications. Avoid all over the counter vitamins, supplements and NSAIDS for one week prior to surgery per anesthesia guidelines. Tylenol is ok to take as needed.     You will receive a call one business day prior to surgery with an arrival time and hospital directions. If your surgery is scheduled on a Monday, the hospital will be calling you on the Friday prior to your surgery. If you have not heard from anyone by 8pm, please call the hospital supervisor through the hospital  at [phone]. ([place] [phone] or [place] [phone]).    Do not eat or drink anything after midnight the night before your surgery, including candy, mints, lifesavers, or chewing gum. Do not drink alcohol 24hrs before your surgery. Try not to smoke at least 24hrs before your surgery.       Follow the pre surgery showering instructions as listed in the “My Surgical Experience Booklet” or otherwise provided by your surgeon's office. Do not use a blade to shave the surgical area 1 week before surgery. It is okay to use a clean electric clippers up to 24 hours before surgery. Do not apply any lotions, creams, including makeup, cologne, deodorant,  or perfumes after showering on the day of your surgery. Do not use dry shampoo, hair spray, hair gel, or any type of hair products.     No contact lenses, eye make-up, or artificial eyelashes. Remove nail polish, including gel polish, and any artificial, gel, or acrylic nails if possible. Remove all jewelry including rings and body piercing jewelry.     Wear causal clothing that is easy to take on and off. Consider your type of surgery.    Keep any valuables, jewelry, piercings at home. Please bring any specially ordered equipment (sling, braces) if indicated.    Arrange for a responsible person to drive you to and from the hospital on the day of your surgery. Please confirm the visitor policy for the day of your procedure when you receive your phone call with an arrival time.     Call the surgeon's office with any new illnesses, exposures, or additional questions prior to surgery.    Please reference your “My Surgical Experience Booklet” for additional information to prepare for your upcoming surgery.

## 2024-08-30 ENCOUNTER — TELEPHONE (OUTPATIENT)
Dept: OBGYN CLINIC | Facility: CLINIC | Age: 59
End: 2024-08-30

## 2024-09-03 ENCOUNTER — TELEPHONE (OUTPATIENT)
Age: 59
End: 2024-09-03

## 2024-09-03 NOTE — TELEPHONE ENCOUNTER
Pat from [facility] called regarding pre-op clearance.  The clearance says cleared at the top but at the bottom says may not proceed with surgery.  Please clarify as soon as possible - surgery is scheduled for tomorrow.  If you have questions, Pat can be reached at [phone].  Thank you.

## 2024-09-04 ENCOUNTER — ANESTHESIA EVENT (OUTPATIENT)
Dept: PERIOP | Facility: HOSPITAL | Age: 59
End: 2024-09-04
Payer: COMMERCIAL

## 2024-09-04 ENCOUNTER — ANESTHESIA (OUTPATIENT)
Dept: PERIOP | Facility: HOSPITAL | Age: 59
End: 2024-09-04
Payer: COMMERCIAL

## 2024-09-04 ENCOUNTER — HOSPITAL ENCOUNTER (OUTPATIENT)
Facility: HOSPITAL | Age: 59
Setting detail: OUTPATIENT SURGERY
Discharge: HOME | End: 2024-09-04
Attending: ORTHOPAEDIC SURGERY | Admitting: ORTHOPAEDIC SURGERY
Payer: COMMERCIAL

## 2024-09-04 VITALS
DIASTOLIC BLOOD PRESSURE: 89 MMHG | OXYGEN SATURATION: 93 % | TEMPERATURE: 97.8 F | SYSTOLIC BLOOD PRESSURE: 136 MMHG | BODY MASS INDEX: 32.18 KG/M2 | HEART RATE: 91 BPM | HEIGHT: 68 IN | RESPIRATION RATE: 19 BRPM | WEIGHT: 212.3 LBS

## 2024-09-04 DIAGNOSIS — Z98.890 S/P LEFT KNEE ARTHROSCOPY: Primary | ICD-10-CM

## 2024-09-04 PROCEDURE — 29881 ARTHRS KNE SRG MNISECTMY M/L: CPT | Performed by: PHYSICIAN ASSISTANT

## 2024-09-04 PROCEDURE — 29881 ARTHRS KNE SRG MNISECTMY M/L: CPT | Performed by: ORTHOPAEDIC SURGERY

## 2024-09-04 RX ORDER — DEXAMETHASONE SODIUM PHOSPHATE 10 MG/ML
INJECTION, SOLUTION INTRAMUSCULAR; INTRAVENOUS AS NEEDED
Status: DISCONTINUED | OUTPATIENT
Start: 2024-09-04 | End: 2024-09-04

## 2024-09-04 RX ORDER — FENTANYL CITRATE 50 UG/ML
INJECTION, SOLUTION INTRAMUSCULAR; INTRAVENOUS AS NEEDED
Status: DISCONTINUED | OUTPATIENT
Start: 2024-09-04 | End: 2024-09-04

## 2024-09-04 RX ORDER — SODIUM CHLORIDE, SODIUM LACTATE, POTASSIUM CHLORIDE, CALCIUM CHLORIDE 600; 310; 30; 20 MG/100ML; MG/100ML; MG/100ML; MG/100ML
INJECTION, SOLUTION INTRAVENOUS CONTINUOUS PRN
Status: DISCONTINUED | OUTPATIENT
Start: 2024-09-04 | End: 2024-09-04

## 2024-09-04 RX ORDER — FENTANYL CITRATE/PF 50 MCG/ML
25 SYRINGE (ML) INJECTION
Status: DISCONTINUED | OUTPATIENT
Start: 2024-09-04 | End: 2024-09-04 | Stop reason: HOSPADM

## 2024-09-04 RX ORDER — OXYCODONE AND ACETAMINOPHEN 5; 325 MG/1; MG/1
1 TABLET ORAL EVERY 4 HOURS PRN
Qty: 10 TABLET | Refills: 0 | Status: SHIPPED | OUTPATIENT
Start: 2024-09-04

## 2024-09-04 RX ORDER — BUPIVACAINE HYDROCHLORIDE 5 MG/ML
INJECTION, SOLUTION EPIDURAL; INTRACAUDAL AS NEEDED
Status: DISCONTINUED | OUTPATIENT
Start: 2024-09-04 | End: 2024-09-04 | Stop reason: HOSPADM

## 2024-09-04 RX ORDER — ONDANSETRON 2 MG/ML
INJECTION INTRAMUSCULAR; INTRAVENOUS AS NEEDED
Status: DISCONTINUED | OUTPATIENT
Start: 2024-09-04 | End: 2024-09-04

## 2024-09-04 RX ORDER — HYDROMORPHONE HCL/PF 1 MG/ML
SYRINGE (ML) INJECTION AS NEEDED
Status: DISCONTINUED | OUTPATIENT
Start: 2024-09-04 | End: 2024-09-04

## 2024-09-04 RX ORDER — HYDROMORPHONE HCL/PF 1 MG/ML
0.5 SYRINGE (ML) INJECTION
Status: DISCONTINUED | OUTPATIENT
Start: 2024-09-04 | End: 2024-09-04 | Stop reason: HOSPADM

## 2024-09-04 RX ORDER — IBUPROFEN 800 MG/1
TABLET, FILM COATED ORAL
Qty: 30 TABLET | Refills: 0 | Status: SHIPPED | OUTPATIENT
Start: 2024-09-04

## 2024-09-04 RX ORDER — ASPIRIN 81 MG/1
81 TABLET ORAL 2 TIMES DAILY
Qty: 28 TABLET | Refills: 0 | Status: SHIPPED | OUTPATIENT
Start: 2024-09-04

## 2024-09-04 RX ORDER — KETOROLAC TROMETHAMINE 30 MG/ML
INJECTION, SOLUTION INTRAMUSCULAR; INTRAVENOUS AS NEEDED
Status: DISCONTINUED | OUTPATIENT
Start: 2024-09-04 | End: 2024-09-04

## 2024-09-04 RX ORDER — SODIUM CHLORIDE, SODIUM LACTATE, POTASSIUM CHLORIDE, AND CALCIUM CHLORIDE .6; .31; .03; .02 G/100ML; G/100ML; G/100ML; G/100ML
IRRIGANT IRRIGATION AS NEEDED
Status: DISCONTINUED | OUTPATIENT
Start: 2024-09-04 | End: 2024-09-04 | Stop reason: HOSPADM

## 2024-09-04 RX ORDER — LIDOCAINE HYDROCHLORIDE 20 MG/ML
INJECTION, SOLUTION EPIDURAL; INFILTRATION; INTRACAUDAL; PERINEURAL AS NEEDED
Status: DISCONTINUED | OUTPATIENT
Start: 2024-09-04 | End: 2024-09-04

## 2024-09-04 RX ORDER — CHLORHEXIDINE GLUCONATE ORAL RINSE 1.2 MG/ML
15 SOLUTION DENTAL ONCE
Status: COMPLETED | OUTPATIENT
Start: 2024-09-04 | End: 2024-09-04

## 2024-09-04 RX ORDER — OXYCODONE AND ACETAMINOPHEN 5; 325 MG/1; MG/1
1 TABLET ORAL ONCE
Status: COMPLETED | OUTPATIENT
Start: 2024-09-04 | End: 2024-09-04

## 2024-09-04 RX ORDER — CHLORHEXIDINE GLUCONATE 40 MG/ML
SOLUTION TOPICAL DAILY PRN
Status: DISCONTINUED | OUTPATIENT
Start: 2024-09-04 | End: 2024-09-04 | Stop reason: HOSPADM

## 2024-09-04 RX ORDER — DIPHENHYDRAMINE HYDROCHLORIDE 50 MG/ML
12.5 INJECTION INTRAMUSCULAR; INTRAVENOUS
Status: DISCONTINUED | OUTPATIENT
Start: 2024-09-04 | End: 2024-09-04 | Stop reason: HOSPADM

## 2024-09-04 RX ORDER — ONDANSETRON 2 MG/ML
4 INJECTION INTRAMUSCULAR; INTRAVENOUS ONCE AS NEEDED
Status: DISCONTINUED | OUTPATIENT
Start: 2024-09-04 | End: 2024-09-04 | Stop reason: HOSPADM

## 2024-09-04 RX ORDER — MIDAZOLAM HYDROCHLORIDE 2 MG/2ML
INJECTION, SOLUTION INTRAMUSCULAR; INTRAVENOUS AS NEEDED
Status: DISCONTINUED | OUTPATIENT
Start: 2024-09-04 | End: 2024-09-04

## 2024-09-04 RX ORDER — PHENYLEPHRINE HCL IN 0.9% NACL 1 MG/10 ML
SYRINGE (ML) INTRAVENOUS AS NEEDED
Status: DISCONTINUED | OUTPATIENT
Start: 2024-09-04 | End: 2024-09-04

## 2024-09-04 RX ORDER — PROPOFOL 10 MG/ML
INJECTION, EMULSION INTRAVENOUS AS NEEDED
Status: DISCONTINUED | OUTPATIENT
Start: 2024-09-04 | End: 2024-09-04

## 2024-09-04 RX ORDER — CEFAZOLIN SODIUM 2 G/50ML
2000 SOLUTION INTRAVENOUS ONCE
Status: COMPLETED | OUTPATIENT
Start: 2024-09-04 | End: 2024-09-04

## 2024-09-04 RX ADMIN — HYDROMORPHONE HYDROCHLORIDE 0.5 MG: 1 INJECTION, SOLUTION INTRAMUSCULAR; INTRAVENOUS; SUBCUTANEOUS at 15:25

## 2024-09-04 RX ADMIN — PROPOFOL 50 MG: 10 INJECTION, EMULSION INTRAVENOUS at 14:24

## 2024-09-04 RX ADMIN — CEFAZOLIN SODIUM 2000 MG: 2 SOLUTION INTRAVENOUS at 14:00

## 2024-09-04 RX ADMIN — CHLORHEXIDINE GLUCONATE 0.12% ORAL RINSE 15 ML: 1.2 LIQUID ORAL at 11:49

## 2024-09-04 RX ADMIN — LIDOCAINE HYDROCHLORIDE 100 MG: 20 INJECTION, SOLUTION EPIDURAL; INFILTRATION; INTRACAUDAL at 13:55

## 2024-09-04 RX ADMIN — FENTANYL CITRATE 50 MCG: 50 INJECTION INTRAMUSCULAR; INTRAVENOUS at 13:55

## 2024-09-04 RX ADMIN — HYDROMORPHONE HYDROCHLORIDE 0.5 MG: 1 INJECTION, SOLUTION INTRAMUSCULAR; INTRAVENOUS; SUBCUTANEOUS at 14:24

## 2024-09-04 RX ADMIN — KETOROLAC TROMETHAMINE 30 MG: 30 INJECTION, SOLUTION INTRAMUSCULAR at 14:46

## 2024-09-04 RX ADMIN — PROPOFOL 200 MG: 10 INJECTION, EMULSION INTRAVENOUS at 13:55

## 2024-09-04 RX ADMIN — DEXAMETHASONE SODIUM PHOSPHATE 10 MG: 10 INJECTION, SOLUTION INTRAMUSCULAR; INTRAVENOUS at 13:56

## 2024-09-04 RX ADMIN — MIDAZOLAM 2 MG: 1 INJECTION INTRAMUSCULAR; INTRAVENOUS at 13:51

## 2024-09-04 RX ADMIN — SODIUM CHLORIDE, SODIUM LACTATE, POTASSIUM CHLORIDE, AND CALCIUM CHLORIDE: .6; .31; .03; .02 INJECTION, SOLUTION INTRAVENOUS at 13:51

## 2024-09-04 RX ADMIN — ONDANSETRON 4 MG: 2 INJECTION INTRAMUSCULAR; INTRAVENOUS at 14:46

## 2024-09-04 RX ADMIN — Medication 200 MCG: at 14:25

## 2024-09-04 RX ADMIN — FENTANYL CITRATE 50 MCG: 50 INJECTION INTRAMUSCULAR; INTRAVENOUS at 14:19

## 2024-09-04 RX ADMIN — FENTANYL CITRATE 25 MCG: 0.05 INJECTION, SOLUTION INTRAMUSCULAR; INTRAVENOUS at 15:17

## 2024-09-04 RX ADMIN — OXYCODONE HYDROCHLORIDE AND ACETAMINOPHEN 1 TABLET: 5; 325 TABLET ORAL at 16:00

## 2024-09-04 RX ADMIN — FENTANYL CITRATE 25 MCG: 0.05 INJECTION, SOLUTION INTRAMUSCULAR; INTRAVENOUS at 15:11

## 2024-09-04 NOTE — ANESTHESIA POSTPROCEDURE EVALUATION
Post-Op Assessment Note    CV Status:  Stable    Pain management: adequate       Mental Status:  Alert and awake   Hydration Status:  Euvolemic   PONV Controlled:  Controlled   Airway Patency:  Patent  Two or more mitigation strategies used for obstructive sleep apnea   Post Op Vitals Reviewed: Yes    No anethesia notable event occurred.    Staff: Anesthesiologist, CRNA               BP   145/86   Temp 97.3   Pulse 99   Resp 22   SpO2 96

## 2024-09-04 NOTE — ANESTHESIA PREPROCEDURE EVALUATION
Procedure:  REPAIR MENISCUS- Left knee medial meniscus root repair vs partial menisectomy (Left: Knee)    Relevant Problems   CARDIO   (+) Mixed hyperlipidemia      /RENAL   (+) BPH with obstruction/lower urinary tract symptoms   (+) Chronic prostatitis      MUSCULOSKELETAL   (+) Lateral epicondylitis of left elbow   (+) Primary osteoarthritis of left knee      BMI 32      Physical Exam    Airway    Mallampati score: II  TM Distance: >3 FB  Neck ROM: full     Dental   No notable dental hx     Cardiovascular      Pulmonary      Other Findings        Anesthesia Plan  ASA Score- 2     Anesthesia Type- general with ASA Monitors.         Additional Monitors:     Airway Plan: LMA.           Plan Factors-Exercise tolerance (METS): >4 METS.    Chart reviewed.    Patient summary reviewed.                  Induction- intravenous.    Postoperative Plan- Plan for postoperative opioid use. Planned trial extubation    Perioperative Resuscitation Plan - Level 1 - Full Code.       Informed Consent- Anesthetic plan and risks discussed with patient.  I personally reviewed this patient with the CRNA. Discussed and agreed on the Anesthesia Plan with the CRNA..

## 2024-09-04 NOTE — OP NOTE
OPERATIVE REPORT  PATIENT NAME: Cristhian Lee    :  1965  MRN: [id number]  Pt Location: [facility] OR ROOM 05    SURGERY DATE: 2024    Surgeons and Role:     * Steve Fletcher,  - Primary     * Martha Morley PA-C - Assisting     * Torres Campos PA-C - Assisting    Preop Diagnosis:  Other tear of medial meniscus of left knee as current injury, subsequent encounter [S83.242D]  Posterior root tear medial meniscus left knee    Post-Op Diagnosis Codes:     * Other tear of medial meniscus of left knee as current injury, subsequent encounter [S83.242D]  Posterior root medial meniscus tear left knee  Medial compartment osteoarthritis left knee    Procedure(s):  Left - REPAIR MENISCUS- Left knee arthroscopy, partial medial menisectomy, chondroplasty medial femoral condyle    Specimen(s):  * No specimens in log *    Estimated Blood Loss:   Minimal    Drains:  * No LDAs found *    Anesthesia Type:   General with 20 cc 0.5% Marcaine local anesthetic    Operative Indications:  Other tear of medial meniscus of left knee as current injury, subsequent encounter [S83.242D]  Medial meniscus root tear with persistent pain affecting activities of daily living    Operative Findings:  Left knee posterior root tear medial meniscus, medial compartment osteoarthritis      Complications:   None    Procedure and Technique:    Antibiotics: 2 g Ancef  Tourniquet time: 40 minutes    The patient was seen in the preoperative holding area and the appropriate site of surgery was confirmed and marked. Upon bringing the patient back to the operating room he was placed supine on the operating room table. A tourniquet was placed on the thigh of the operative leg and the leg was placed in the appropriate leg curran. The well leg was placed on a well-padded pillow. Esmarch was used to exsanguinate the leg and tourniquet was inflated to 275 mm Hg. The left lower extremity was prepped and draped in the usual sterile fashion. An appropriate  preoperative time-out was performed to once again confirm the site of surgery and procedure.    A lateral incision was made for an arthroscopic viewing portal and the arthroscopic camera was inserted. A diagnostic arthroscopy was performed and displayed patellofemoral articular surfaces to be intact with small areas of partial-thickness cartilage thinning without unstable flaps noted in the trochlear groove and medial patellar articular surface. A medial arthroscopic portal was made under direct visualization with the aid of an 18 gauge spinal needle. An arthroscopic probe was inserted and the remainder of the diagnostic arthroscopy was performed which further showed medial compartment osteoarthritis.  Upon probing, full-thickness cartilage loss with probing to bone was noted over the central to posterior weightbearing portion of the medial femoral condyle approximately 5 x 5 mm in size with associated diffuse surrounding partial-thickness cartilage loss on the medial femoral condyle weightbearing portion with small unstable flaps.  Inspection of the tibial articular surface displayed diffuse cartilage thinning with small areas of fissuring to bone in the anterior weightbearing portion of the tibial articular cartilage. Tearing was noted of the posterior root of the medial meniscus with associated degenerative tearing extending into the posterior horn.  Upon moving to the intercondylar notch, ACL was noted to be intact, upon moving to the lateral compartment, chondral surfaces were noted to be intact and lateral meniscus was noted to be intact.  Due to the level of degenerative changes in the medial compartment with bone exposed in the medial femoral condyle and fissuring down to bone present in the tibial plateau, repair of meniscal root tear was not performed.  Arthroscopic biter was inserted to sharply debride unstable flaps of meniscal tissue in the posterior horn of the meniscus and taper the degenerative  portion of the tear back to the root.  An arthroscopic shaver was inserted to debride loose ends of the medial meniscal tear and further taper the repair to a nice smooth contour. The arthroscopic probe was once again inserted to ensure stable edges of the meniscus status post partial meniscectomy. Arthroscopic incisions were closed with 3-0 nylon suture.  20 cc of 0.5% Marcaine local anesthetic were used at the surgical site.  A sterile dressing was applied and tourniquet was released.  The patient tolerated the procedure well no complications were noted. The patient was transferred to the PACU without any issue.    I was present for the entire procedure.  No qualified resident was available for the surgical case and physicians assistant, Martha Morley PA-C, was required for patient positioning, tissue handling, assistance with arthroscopic camera and equipment due to the minimally invasive nature of the surgical case, and suturing.    Patient Disposition:  PACU              SIGNATURE: Steve Flethcer DO  DATE: September 4, 2024  TIME: 2:49 PM

## 2024-09-04 NOTE — INTERVAL H&P NOTE
H&P reviewed. After examining the patient I find no changes in the patients condition since the H&P had been written. Patient was seen and evaluated in the office where continued nonoperative vs surgical management of Left knee medial meniscus root tear was discussed. In light of patients persistent knee pain and decreased function despite nonoperative modalities, patient would like to move forward with surgical intervention. Risks of surgical intervention discussed in the office with patient and detailed consent obtained. Patient will go to OR on 09/04/2024 with Dr Fletcher for Left knee arthroscopic medial meniscus root repair vs partial menisectomy.     14 point ROS in office   Musculoskeletal Left knee pain      Heart RRR  Lungs CTA BL   Abdomen Present nontender    Left Knee  Range of motion from 0 to 100 degrees with pain at terminal flexion.    There is a small effusion.    There is tenderness over the posteromedial and lateral joint line.    The patient is able to perform a straight leg raise with 4+/5 quad strength secondary to pain.    Varus stress testing reveals no pain or instability at 0 and 30 degrees   Valgus stress testing reveals no pain or instability at 0 and 30 degrees  Edgar's positive medially  The patient is neurovascular intact distally.      Vitals:    09/04/24 1144   BP: 135/96   Pulse: 87   Resp: 16   Temp: 97.5 °F (36.4 °C)   SpO2: 98%

## 2024-09-04 NOTE — DISCHARGE INSTR - AVS FIRST PAGE
Knee Surgery: Postoperative Instructions  Dr. Steve Fletcher  Diet    You may resume your regular diet as soon as possible  Medication    Take the pain medication as prescribed   Take pain medication with food   While taking pain medications, you may NOT operate a vehicle, heavy machinery, or appliances   While taking pain medication, you may NOT drink alcoholic beverages   If you have any reactions to your medications, stop taking them and call my office   If you are not allergic, take one aspirin 81mg twice a day to help prevent blood clots   Please keep in mind that constipation is a very common side effect of taking narcotic pain medication. Take precautions to prevent constipation:  o Drink plenty of water (6-8 glasses of 8 oz. a day)  o Avoid alcohol, caffeine, and dairy products  o Eat plenty of fiber (fruits, vegetables, and whole grains)  o Take an over the counter stool softener (Colace or Dulcolax)  Activity    RANGE OF MOTION   _____ You may bend your knee as much as the dressings will allow     WEIGHT BEARING   _____ You may weight bear as tolerated     You may practice quadriceps muscle tightening and straight leg raises several times every hour.   Please continue to move your ankle up and down and tighten and relax your calf muscles several times every hour to help reduce swelling and prevent blood clots.   You may use your crutches for balance as needed until your first post operative visit.   The optimal position of the leg after surgery is for you to be lying flat with your ankle higher than your knee and higher than your heart, in an effort to reduce swelling.   It is important to continuously elevate your knee above your heart until your swelling is completely down.   Please keep ice applied to the knee for at least the first 72 hours or as long as pain or swelling persists. Do not apply ice directly to skin, or allow water to leak on your dressing.  Showering    You may shower 3 days after  surgery unless told otherwise. DO NOT immerse the knee under water and DO NOT rub the incision. Pad the incisions dry and place new Band-Aids over the sutures after showering.    If you have a brace, you may remove it to shower. Keep your knee straight in the shower.   You may sponge bathe for the first 72 hours, taking care to keep the dressing clean and dry.  Dressing Care    Keep the dressing clean and dry.   It is normal to get some bloody wound seepage through the bandage. DO NOT BE ALARMED.    If the dressing gets soaked with wound seepage, please reinforce with a dry sterile dressing.   Loosen the ace wrap around your knee if it becomes too tight or painful.   Remove all dressings 3 days after surgery. If there is still some wound seepage, apply a fresh STERILE gauze over the incisions and secure with tape or an ace wrap.    DO NOT TOUCH OR REMOVE THE SUTURES!!  Emergency/Follow-Up   Please notify my office at [phone] if you develop any fever (101 deg or above), unexpected warmth, redness or swelling. Please call if your toes become cold, purple, numb, or there is excessive bleeding.   Please call the office within 24 hours at [phone] to schedule a follow up appt within 7-10 days from surgery.

## 2024-09-09 NOTE — PROGRESS NOTES
PT Evaluation     Today's date: 09/10/24  Patient name: Cristhian Lee  : 1965  MRN: [id number]  Referring provider: Martha Morley PA-C  Dx:   Encounter Diagnosis     ICD-10-CM    1. Acute pain of left knee  M25.562       2. S/P left knee arthroscopy  Z98.890 Ambulatory Referral to Physical Therapy                      Assessment  Impairments: abnormal gait, abnormal or restricted ROM, activity intolerance, impaired physical strength, lacks appropriate home exercise program, pain with function and weight-bearing intolerance  Functional limitations: Antalgic gait, difficulty negotiating stairs reciprocally, and currently OOW.    Assessment details: Cristhian Lee is a 59 y.o. male referred with primary diagnosis of Acute pain of left knee  (primary encounter diagnosis)  S/P left knee arthroscopy .  Patient presents with the following functional limitations: Antalgic gait, difficulty negotiating stairs reciprocally, and currently OOW.  Patient demonstrates decreased ROM in the left knee, an antalgic gait pattern, and pain with twisting his knee.  Mild edema at anteromedial knee.  Treatment to include: Manual therapy techniques, extremity/core strengthening, neuromuscular control exercises, balance/proprioception training as appropriate, gait training, instruction in a comprehensive HEP, and modalities as needed.  They will benefit from skilled PT services to address the above functional deficits and to decrease pain to promote a return to their premorbid level of function.    Understanding of Dx/Px/POC: good     Prognosis: good  Prognosis details: Good    Goals  STG (4 weeks)  1. Patient will demonstrate 25% gains in knee ROM in all deficient planes  2. Patient will report pain as a 4-5/10 at worst with all normal activities  3. Patient will negotiate stairs with reciprocal pattern and single HR  LTG (8 weeks)  1. Patient will report pain as a 0-1/10 at worst with normal activities  2. Patient will  ambulate community distances without limitations related to pain.  3. Patient will demonstrate knee ROM WFL to restore prior level of function  4. Patient will demonstrate bilateral LE strength WNL to negotiate stairs reciprocally without HR  5. Patient will be independent and compliant with a HEP in order to maintain gains made with skilled PT services.      Plan  Patient would benefit from: skilled physical therapy  Planned modality interventions: cryotherapy    Planned therapy interventions: joint mobilization, IASTM, manual therapy, balance, patient education, neuromuscular re-education, gait training, strengthening, stretching, therapeutic activities, therapeutic exercise and home exercise program    Frequency: 2x week  Duration in weeks: 8  Plan of Care beginning date: 9/10/2024  Plan of Care expiration date: 2024  Treatment plan discussed with: patient    Subjective Evaluation    History of Present Illness  Date of onset: 2024  Mechanism of injury: Patient states he hurt his knee when running.  Symptoms got progressively worse and finally gave out when showering.  Patient is s/t left knee arthroscopy, partial medial meniscectomy, chondroplasty medial femoral condyle performed on 24.  He is walking without an assistive device and can go up/down stairs reciprocally with pain.  Patient is referred now to outpatient PT services.          Not a recurrent problem   Quality of life: fair    Patient Goals  Patient goals for therapy: increased strength and increased motion    Pain  Current pain ratin  At best pain ratin  At worst pain ratin  Location: Medial knee  Quality: knife-like and sharp    Social Support  Lives in: multiple-level home  Lives with: spouse    Employment status: not working (.  Currently OOW.  Trying to get full disability.)    Diagnostic Tests  MRI studies: abnormal    FCE comments: Negotiates stairs reciprocally with HR.  (+) sleep disturbances related to  "pain.Treatments  Previous treatment: medication  Current treatment: medication      Objective     Tenderness   Left Knee   Tenderness in the medial joint line and medial patella.     Active Range of Motion   Left Knee   Flexion: 125 degrees     Right Knee   Flexion: 138 degrees     Passive Range of Motion   Left Knee   Flexion: 128 degrees with pain    Strength/Myotome Testing     Left Knee   Flexion: 4+  Extension: 4+    Ambulation   Weight-Bearing Status   Weight-Bearing Status (Left): full weight bearing     Ambulation: Stairs   Ascend stairs: independent  Pattern: reciprocal  Railings: one rail  Descend stairs: independent  Pattern: reciprocal  Railings: one rail    Observational Gait   Gait: antalgic   Decreased walking speed and stride length.          Precautions: None    POC expires Unit limit Auth  expiration date PT/OT + Visit Limit?   11/5/24 NA 9/4/25 1                 Visit/Unit Tracking  AUTH Status:  Date 9/10              Pending Used 1               Remaining                  QR     Manuals 9/10                                                                Neuro Re-Ed             Pt education Knee anatomy and pathology            Star slider ex                                                                              Ther Ex             Bike L1x10'            Step HS stretch 3x30\"            Matrix quads 90-30             Matrix HS             Jake TKE             Heel slides with strap                                       Ther Activity             TG squats L22 3x10            Step ups F/L             Lateral step downs                          Gait Training                                       Modalities                                            "

## 2024-09-10 ENCOUNTER — EVALUATION (OUTPATIENT)
Dept: PHYSICAL THERAPY | Facility: CLINIC | Age: 59
End: 2024-09-10
Payer: COMMERCIAL

## 2024-09-10 DIAGNOSIS — M25.562 ACUTE PAIN OF LEFT KNEE: Primary | ICD-10-CM

## 2024-09-10 DIAGNOSIS — Z98.890 S/P LEFT KNEE ARTHROSCOPY: ICD-10-CM

## 2024-09-10 PROCEDURE — 97161 PT EVAL LOW COMPLEX 20 MIN: CPT | Performed by: PHYSICAL THERAPIST

## 2024-09-10 PROCEDURE — 97110 THERAPEUTIC EXERCISES: CPT | Performed by: PHYSICAL THERAPIST

## 2024-09-16 ENCOUNTER — OFFICE VISIT (OUTPATIENT)
Dept: OBGYN CLINIC | Facility: CLINIC | Age: 59
End: 2024-09-16

## 2024-09-16 ENCOUNTER — TELEPHONE (OUTPATIENT)
Age: 59
End: 2024-09-16

## 2024-09-16 VITALS
DIASTOLIC BLOOD PRESSURE: 80 MMHG | WEIGHT: 212 LBS | HEIGHT: 68 IN | SYSTOLIC BLOOD PRESSURE: 119 MMHG | HEART RATE: 75 BPM | BODY MASS INDEX: 32.13 KG/M2

## 2024-09-16 DIAGNOSIS — Z98.890 S/P LEFT KNEE ARTHROSCOPY: Primary | ICD-10-CM

## 2024-09-16 DIAGNOSIS — Z48.89 AFTERCARE FOLLOWING SURGERY: ICD-10-CM

## 2024-09-16 PROCEDURE — 99024 POSTOP FOLLOW-UP VISIT: CPT | Performed by: ORTHOPAEDIC SURGERY

## 2024-09-16 NOTE — TELEPHONE ENCOUNTER
Patients GI provider:  Dr. Alvarez    Number to return call: [phone]     Reason for call: Pt wife returned call confirmed the pt would like ot transfer care to Dr. Alvarez.     Scheduled procedure/appointment date if applicable: Appt:9/30/24

## 2024-09-16 NOTE — PROGRESS NOTES
"Patient Name:  Cristhian Lee  MRN:  [id number]    Assessment & Plan     1. S/P left knee arthroscopy  2. Aftercare following surgery    Approximately 12 day s/p Left knee arthroscopic partial medial menisectomy, chondroplasty medial femoral condyle performed on 09/04/2024  Overall, patient doing well despite residual pain  Sutures removed without complications, reviewed intraoperative images   Patient was encouraged to continue outpatient PT to work on range of motion and strengthening   Patient may follow up as needed for additional orthopaedic concerns including the shoulder and contralateral knee  Follow up 4 weeks for reevaluation of left knee    History of the Present Illness   Cristhian Lee is a 59 y.o. male approximately 12 day s/p Left knee arthroscopic partial medial menisectomy, chondroplasty medial femoral condyle performed on 09/04/2024. Today, patient reports he is feeling okay. He admits to a sharp, shooting pain in the knee with prolonged ambulation. Patient's wife reports he was unable to ambulate through the grocery store the other day because of pain. Patient reports he cannot take Tylenol, and has been using the ibuprofen.        Review of Systems     Review of Systems   Constitutional:  Negative for chills and fever.   HENT:  Negative for ear pain and sore throat.    Eyes:  Negative for pain and visual disturbance.   Respiratory:  Negative for cough and shortness of breath.    Cardiovascular:  Negative for chest pain and palpitations.   Gastrointestinal:  Negative for abdominal pain and vomiting.   Genitourinary:  Negative for dysuria and hematuria.   Musculoskeletal:  Negative for arthralgias and back pain.   Skin:  Negative for color change and rash.   Neurological:  Negative for seizures and syncope.   All other systems reviewed and are negative.      Physical Exam     /80   Pulse 75   Ht 5' 8\" (1.727 m)   Wt 96.2 kg (212 lb)   BMI 32.23 kg/m²     Left Knee  Surgical " incisions well healing, without signs of infection  Range of motion from 0 to 115 degrees with mild pain at terminal flexion.    There is minimal post operative effusion.    There is tenderness over the medial joint line.    The patient is able to perform a straight leg raise with 4+/5 quad strength.     Calf soft, compressible and nontender   The patient is neurovascular intact distally.      Data Review     I have personally reviewed pertinent films in PACS, and my interpretation follows.    No new images    Social History     Tobacco Use    Smoking status: Former     Passive exposure: Past    Smokeless tobacco: Never   Vaping Use    Vaping status: Never Used   Substance Use Topics    Alcohol use: No    Drug use: Never           Procedures  None     Martha Morley PA-C

## 2024-09-16 NOTE — TELEPHONE ENCOUNTER
RENEM for pt to return our call to confirm he is transferring his care to Dr. Alvarez from Dr. Barba.

## 2024-09-17 ENCOUNTER — TELEPHONE (OUTPATIENT)
Dept: OTHER | Facility: HOSPITAL | Age: 59
End: 2024-09-17

## 2024-09-17 ENCOUNTER — OFFICE VISIT (OUTPATIENT)
Dept: PHYSICAL THERAPY | Facility: CLINIC | Age: 59
End: 2024-09-17
Payer: COMMERCIAL

## 2024-09-17 DIAGNOSIS — M25.562 ACUTE PAIN OF LEFT KNEE: Primary | ICD-10-CM

## 2024-09-17 DIAGNOSIS — N40.1 BPH WITH OBSTRUCTION/LOWER URINARY TRACT SYMPTOMS: ICD-10-CM

## 2024-09-17 DIAGNOSIS — Z98.890 S/P LEFT KNEE ARTHROSCOPY: ICD-10-CM

## 2024-09-17 DIAGNOSIS — N13.8 BPH WITH OBSTRUCTION/LOWER URINARY TRACT SYMPTOMS: ICD-10-CM

## 2024-09-17 PROCEDURE — 97530 THERAPEUTIC ACTIVITIES: CPT | Performed by: PHYSICAL THERAPIST

## 2024-09-17 PROCEDURE — 97110 THERAPEUTIC EXERCISES: CPT | Performed by: PHYSICAL THERAPIST

## 2024-09-17 RX ORDER — TAMSULOSIN HYDROCHLORIDE 0.4 MG/1
0.8 CAPSULE ORAL
Qty: 90 CAPSULE | Refills: 1 | Status: SHIPPED | OUTPATIENT
Start: 2024-09-17

## 2024-09-17 RX ORDER — TAMSULOSIN HYDROCHLORIDE 0.4 MG/1
0.8 CAPSULE ORAL
Qty: 14 CAPSULE | Refills: 0 | Status: SHIPPED | OUTPATIENT
Start: 2024-09-17 | End: 2024-09-26

## 2024-09-17 NOTE — PROGRESS NOTES
"Daily Note     Today's date: 2024  Patient name: Cristhian Lee  : 1965  MRN: [id number]  Referring provider: Martha Morley PA-C  Dx:   Encounter Diagnosis     ICD-10-CM    1. Acute pain of left knee  M25.562       2. S/P left knee arthroscopy  Z98.890                      Subjective: Saw surgeon yesterday and had his stitches removed.  Was advised by surgeon that the sharp pain he gets will continue to improve over time.  Patient states he doesn't \"want to push it\" and make it worse.        Objective: See treatment diary below      Assessment: Tolerated treatment well. Patient demonstrated fatigue post treatment and would benefit from continued PT.  Reports some pain in patella with TG squats and [place] Matrix.  Unable to control lateral step down from 6\" step.  Knee flexion ROM is greatly improved.  Educated on DOMS.      Plan: Continue per plan of care.  Progress treatment as tolerated.       Precautions: None    POC expires Unit limit Auth  expiration date PT/OT + Visit Limit?   24 NA 25 1                 Visit/Unit Tracking  AUTH Status:  Date 9/10 9/17             Pending Used 1 2              Remaining                  QR     Manuals 9/10 9/17                                                               Neuro Re-Ed             Pt education Knee anatomy and pathology            Star slider ex                                                                              Ther Ex             Bike L1x10' L1x10'           Step HS stretch 3x30\" 3x30\"           Matrix quads 90-30  30# 2x10           Matrix HS  40# 2x10           Jake TKE  11# 5\" 2x10           Heel slides with strap  5\"x30 no strap           Supine SLR  3\" 3x10                        Ther Activity             TG squats L22 3x10 L22 3x10           Step ups F/L             Lateral step downs  4\"x10                        Gait Training                                       Modalities                                     "

## 2024-09-17 NOTE — TELEPHONE ENCOUNTER
Patient called requesting refill for tamsulosin. Patient made aware medication was refilled on 9/17 for 14 with 0 refills to [facility] pharmacy. Patient instructed to contact the pharmacy to obtain refills of medication. Patient verbalized understanding.

## 2024-09-20 ENCOUNTER — HOSPITAL ENCOUNTER (OUTPATIENT)
Dept: CT IMAGING | Facility: CLINIC | Age: 59
Discharge: HOME | End: 2024-09-20
Payer: COMMERCIAL

## 2024-09-20 DIAGNOSIS — Z12.2 SCREENING FOR LUNG CANCER: ICD-10-CM

## 2024-09-20 DIAGNOSIS — Z87.891 STOPPED SMOKING WITH GREATER THAN 30 PACK YEAR HISTORY: ICD-10-CM

## 2024-09-20 PROCEDURE — 71271 CT THORAX LUNG CANCER SCR C-: CPT

## 2024-09-23 NOTE — PROGRESS NOTES
"Daily Note     Today's date: 2024  Patient name: Cristhian Lee  : 1965  MRN: [id number]  Referring provider: Martha Morley PA-C  Dx:   Encounter Diagnosis     ICD-10-CM    1. Acute pain of left knee  M25.562       2. S/P left knee arthroscopy  Z98.890                      Subjective: Patient states his knee is continuing to improve.  Has a little pain in his HS today.  Sixes good after last session.      Objective: See treatment diary below      Assessment: Tolerated treatment well. Patient demonstrated fatigue post treatment and would benefit from continued PT.  Patient reports having pain with lateral step-downs today.  Strength and ROM are improving.        Plan: Continue per plan of care.  Progress treatment as tolerated.       Precautions: None    POC expires Unit limit Auth  expiration date PT/OT + Visit Limit?   24 NA 25 1                 Visit/Unit Tracking  AUTH Status:  Date 9/10 9/17 9/24            Pending Used 1 2 3             Remaining                  QR     Manuals 9/10 9/17 9/24                                                              Neuro Re-Ed             Pt education Knee anatomy and pathology            Star slider ex                                                                              Ther Ex             Bike L1x10' L1x10' L2x10'          Step HS stretch 3x30\" 3x30\" 3x30\"          Matrix quads 90-30  30# 2x10 30# 3x10          Matrix HS  40# 2x10 40# 3x10          [place] TKE  11# 5\" 2x10 11# 5\" 3x10          Heel slides with strap  5\"x30 no strap           Supine SLR  3\" 3x10                        Ther Activity             TG squats L22 3x10 L22 3x10 L22 3x10          Step ups F/L   6\" 2x10          Lateral step downs  4\"x10 4\"x5                       Gait Training                                       Modalities                                              "

## 2024-09-24 ENCOUNTER — OFFICE VISIT (OUTPATIENT)
Dept: PHYSICAL THERAPY | Facility: CLINIC | Age: 59
End: 2024-09-24
Payer: COMMERCIAL

## 2024-09-24 DIAGNOSIS — Z98.890 S/P LEFT KNEE ARTHROSCOPY: ICD-10-CM

## 2024-09-24 DIAGNOSIS — M25.562 ACUTE PAIN OF LEFT KNEE: Primary | ICD-10-CM

## 2024-09-24 PROCEDURE — 97530 THERAPEUTIC ACTIVITIES: CPT | Performed by: PHYSICAL THERAPIST

## 2024-09-24 PROCEDURE — 97110 THERAPEUTIC EXERCISES: CPT | Performed by: PHYSICAL THERAPIST

## 2024-09-26 ENCOUNTER — OFFICE VISIT (OUTPATIENT)
Dept: OBGYN CLINIC | Facility: CLINIC | Age: 59
End: 2024-09-26

## 2024-09-26 VITALS
BODY MASS INDEX: 33.59 KG/M2 | WEIGHT: 221.6 LBS | SYSTOLIC BLOOD PRESSURE: 116 MMHG | DIASTOLIC BLOOD PRESSURE: 76 MMHG | HEIGHT: 68 IN | HEART RATE: 83 BPM

## 2024-09-26 DIAGNOSIS — M75.32 CALCIFIC TENDINITIS OF LEFT SHOULDER: Primary | ICD-10-CM

## 2024-09-26 PROCEDURE — 99213 OFFICE O/P EST LOW 20 MIN: CPT | Performed by: ORTHOPAEDIC SURGERY

## 2024-09-26 NOTE — PROGRESS NOTES
Orthopaedics Office Visit - Follow up Patient Visit    ASSESSMENT/PLAN:    Assessment:   Calcific tendonitis left rotator cuff    Possible rotator cuff tear from work injury  Possible biceps rupture secondary to work injury - palpable swelling and cramping in bicep area      Plan:   Discussed conservative treatment with patient at length  Weight bearing as tolerated left upper extremity   Begin physical therapy as directed   Over the counter analgesics as needed / directed   Ice / heat as directed   Discussed possibility of additional imaging in the future pending symptoms next visit   MRI left shoulder   Follow up 6 weeks       To Do Next Visit:  Evaluate left shoulder pain   _____________________________________________________  CHIEF COMPLAINT:  Chief Complaint   Patient presents with    Left Shoulder - Follow-up     Patient states pain is worsening in the L shoulder. He would like an MRI. Limited ROM. He c/o some numbness and tingling down L arm. Xray 08/01/23.         SUBJECTIVE:  Cristhian Lee is a 59 y.o. male who presents to the office today follow-up evaluation of his left shoulder.  Patient states that he continues to have anterior lateral shoulder pain associated with overhead activities of the shoulder.  Patient was last seen on 8/1/2023 he was diagnosed with rotator cuff tendinitis versus cervical radiculopathy.   Patient has had no conservative treatment for the shoulder since this appointment.  Patient has not had any recent physical therapy for the left shoulder.  Patient takes Tylenol as needed for pain.  Patient states that his pain does extend into the upper arm.  Patient denies any numbness or tingling in the hand.  Patient states that this is from a Workmen's Comp injury occurring in July 2023.  Patient offers no other complaints at this time.    PAST MEDICAL HISTORY:  Past Medical History:   Diagnosis Date    Benign localized prostatic hyperplasia with lower urinary tract symptoms (LUTS)      Last Assessed:8/25/2015    Hypogonadism in male     last assessed:8/25/2015    PONV (postoperative nausea and vomiting)        PAST SURGICAL HISTORY:  Past Surgical History:   Procedure Laterality Date    APPENDECTOMY      KNEE SURGERY Left 09/04/2024    a/pmm    NOSE SURGERY  2020    OTHER SURGICAL HISTORY      testicular torsion surgery    [place] ARTHROSCOPY KNEE W/MENISCUS RPR MEDIAL/LATERAL Left 09/04/2024    Procedure: Left knee arthroscopy, partial medial meniscectomy, chondroplasty medial femoral condyle;  Surgeon: Steve Fletcher DO;  Location: [facility];  Service: Orthopedics    AZ CYSTOURETHROSCOPY N/A 01/19/2024    Procedure: CYSTOSCOPY, TRUS;  Surgeon: Diego Kasper MD;  [facility];  Service: Urology    VASECTOMY      Last Assessed:4/7/2015    VASECTOMY REVERSAL         FAMILY HISTORY:  Family History   Problem Relation Age of Onset    Glaucoma Mother     Substance Abuse Neg Hx     Mental illness Neg Hx        SOCIAL HISTORY:  Social History     Tobacco Use    Smoking status: Former     Passive exposure: Past    Smokeless tobacco: Never   Vaping Use    Vaping status: Never Used   Substance Use Topics    Alcohol use: No    Drug use: Never       MEDICATIONS:    Current Outpatient Medications:     aspirin (ECOTRIN LOW STRENGTH) 81 mg EC tablet, Take 1 tablet (81 mg total) by mouth 2 (two) times a day, Disp: 28 tablet, Rfl: 0    cetirizine (ZyrTEC) 10 mg tablet, Take 10 mg by mouth daily, Disp: , Rfl:     Coenzyme Q10 (Co Q 10) 10 MG CAPS, Take by mouth, Disp: , Rfl:     ergocalciferol (VITAMIN D2) 50,000 units, TAKE 1 CAPSULE ONCE A WEEK, Disp: 12 capsule, Rfl: 3    ibuprofen (MOTRIN) 800 mg tablet, Take 1 tablet by mouth 3 times daily, then take as needed. Take with food and water. Discontinue if stomach upset occurs., Disp: 30 tablet, Rfl: 0    loratadine (CLARITIN) 10 mg tablet, Take 10 mg by mouth daily, Disp: , Rfl:     NON FORMULARY, Mineral mix, Disp: , Rfl:     Omega-3 Fatty Acids  "(FISH OIL PO), Take by mouth, Disp: , Rfl:     oxyCODONE-acetaminophen (Percocet) 5-325 mg per tablet, Take 1 tablet by mouth every 4 (four) hours as needed for moderate pain Max Daily Amount: 6 tablets, Disp: 10 tablet, Rfl: 0    PARoxetine (PAXIL) 30 mg tablet, TAKE 2 TABLETS DAILY, Disp: 180 tablet, Rfl: 3    tadalafil (CIALIS) 5 MG tablet, Take 1 tablet (5 mg total) by mouth daily as needed (BPH), Disp: 90 tablet, Rfl: 3    tamsulosin (FLOMAX) 0.4 mg, Take 2 capsules (0.8 mg total) by mouth daily with dinner, Disp: 90 capsule, Rfl: 1    tamsulosin (FLOMAX) 0.4 mg, Take 2 capsules (0.8 mg total) by mouth daily with dinner for 7 days, Disp: 14 capsule, Rfl: 0    ALLERGIES:  Allergies   Allergen Reactions    Meperidine Irritability     Increase pain, anger, irritability    Quinolones     Sulfamethoxazole-Trimethoprim Other (See Comments)       LABS:  HgA1c: No results found for: \"HGBA1C\"  BMP:   Lab Results   Component Value Date    GLUCOSE 97 10/01/2016    CALCIUM 9.7 08/24/2024     10/01/2016    K 4.2 08/24/2024    CO2 27 08/24/2024     08/24/2024    BUN 14 08/24/2024    CREATININE 0.91 08/24/2024     CBC: No components found for: \"CBC\"    _____________________________________________________  PHYSICAL EXAMINATION:  Vital signs: /76   Pulse 83   Ht 5' 8\" (1.727 m)   Wt 101 kg (221 lb 9.6 oz)   BMI 33.69 kg/m²   General: No acute distress, awake and alert  Psychiatric: Mood and affect appear appropriate  HEENT: Trachea Midline, No torticollis, no apparent facial trauma  Cardiovascular: No audible murmurs; Extremities appear perfused  Pulmonary: No audible wheezing or stridor  Skin: No open lesions; see further details (if any) below      MUSCULOSKELETAL EXAMINATION:  Left shoulder examination :  Patient sitting comfortably in the office in no apparent distress   No acute visible abnormalities present in the left shoulder.  Extremity appears well-perfused overall.  Tenderness palpation noted " "over the proximal biceps tendon, greater tuberosity.  No other bony or soft tissue tenderness to palpation noted at this time.  Forward flexion and abduction to approximately 170 degrees,   passive internal and external rotation of the shoulder to 90 degrees each  5/5 strength appreciated with forward flexion, abduction, internal and external rotation, flexion and extension of the elbow   NV intact  _____________________________________________________  STUDIES REVIEWED:  I personally reviewed the images obtained in office today and my independent interpretation is as follows:  N/A         PROCEDURES PERFORMED:  No procedures were performed at this time.                     Devon Kim PA-C - assisting  Philip Mendez MD                            Portions of the record may have been created with voice recognition software.  Occasional wrong word or \"sound a like\" substitutions may have occurred due to the inherent limitations of voice recognition software.  Read the chart carefully and recognize, using context, where substitutions have occurred.    "

## 2024-09-26 NOTE — PATIENT INSTRUCTIONS
Discussed conservative treatment with patient at length  Weight bearing as tolerated left upper extremity   Begin physical therapy as directed   Over the counter analgesics as needed / directed   Ice / heat as directed   Discussed possibility of additional imaging in the future pending symptoms next visit   MRI left shoulder   Follow up 6 weeks

## 2024-10-01 ENCOUNTER — APPOINTMENT (OUTPATIENT)
Dept: PHYSICAL THERAPY | Facility: CLINIC | Age: 59
End: 2024-10-01
Payer: COMMERCIAL

## 2024-10-04 ENCOUNTER — TELEPHONE (OUTPATIENT)
Dept: OBGYN CLINIC | Facility: HOSPITAL | Age: 59
End: 2024-10-04

## 2024-10-04 NOTE — TELEPHONE ENCOUNTER
Caller: Patient    Doctor: Andrea    Reason for call: Patient needed OV notes and PT script faxed to [facility] - faxed OV notes from 9/26/24 & 9/16/24 and PT script from 9/2624 to Katharine Mena [facility] Claim # [id number] - fax# [phone]    Pt cannot schedule PT until they receive they info and approve    Call back#: [phone]

## 2024-10-08 ENCOUNTER — APPOINTMENT (OUTPATIENT)
Dept: PHYSICAL THERAPY | Facility: CLINIC | Age: 59
End: 2024-10-08
Payer: COMMERCIAL

## 2024-10-10 ENCOUNTER — OFFICE VISIT (OUTPATIENT)
Dept: PHYSICAL THERAPY | Facility: CLINIC | Age: 59
End: 2024-10-10
Payer: COMMERCIAL

## 2024-10-10 DIAGNOSIS — M25.562 ACUTE PAIN OF LEFT KNEE: Primary | ICD-10-CM

## 2024-10-10 DIAGNOSIS — Z98.890 S/P LEFT KNEE ARTHROSCOPY: ICD-10-CM

## 2024-10-10 PROCEDURE — 97530 THERAPEUTIC ACTIVITIES: CPT

## 2024-10-10 PROCEDURE — 97110 THERAPEUTIC EXERCISES: CPT

## 2024-10-10 NOTE — PROGRESS NOTES
Daily Note    Today's date: 10/10/24  Patient name: Cristhian Lee  : 1965  MRN: [id number]  Referring provider: Martha Morley PA-C  Dx:   Encounter Diagnosis     ICD-10-CM    1. Acute pain of left knee  M25.562       2. S/P left knee arthroscopy  Z98.890           Start Time: 1400  Stop Time: 1445  Total time in clinic (min): 45 minutes      Subjective: Candido presents to PT feeling okay today. Patient reports feeling fine following previous PT session. Patient mentioned they have pain in their left knee from laying down. Patient reports their knee may give out from under when they stand up too fast. Candido also mentioned having pain along the inside of their L leg when bending forward.     Objective: See treatment diary below.    Assessment: Candido tolerated treatment well with minimal cuing. TE's were performed with the same resistance and reps. New TE's were demonstrated with proper technique, and tolerated well. Patient was able to complete all of his exercises without any modification. Patient mentioned having tightness along their L calf muscle. Patient performed calf stretches with the prostretch. Patient was able to complete all of his exercises w/o any adverse symptoms. The patient would benefit from continued physical therapy.       Plan: Continue per plan of care.  Progress treatment as tolerated.         Precautions: None    POC expires Unit limit Auth  expiration date PT/OT + Visit Limit?   24 NA 25 1                 Visit/Unit Tracking  AUTH Status:  Date 9/10 9/17 9/24 10/10           Pending Used 1 2 3 4            Remaining                  QR     Manuals 9/10 9/17 9/24 10/10                                                             Neuro Re-Ed             Pt education Knee anatomy and pathology            Star slider ex                                                                              Ther Ex             Bike L1x10' L1x10' L2x10' L2 10' Recumbent         Step HS  "stretch 3x30\" 3x30\" 3x30\" 3x30\"          Matrix quads 90-30  30# 2x10 30# 3x10 30# 3x10         Matrix HS  40# 2x10 40# 3x10 40#   3x10         [place] TKE  11# 5\" 2x10 11# 5\" 3x10 11#   5\" 3x10         Heel slides with strap  5\"x30 no strap           Supine SLR  3\" 3x10           Prostretch     3x30\"         Ther Activity             TG squats L22 3x10 L22 3x10 L22 3x10          Step ups F/L   6\" 2x10 L 6\" 2x10  F 4\" 2x10         Lateral step downs  4\"x10 4\"x5 4\"x10          STS     w/ airex and 2 foam pads 15x         Gait Training                                       Modalities                                                              "

## 2024-10-14 ENCOUNTER — OFFICE VISIT (OUTPATIENT)
Dept: OBGYN CLINIC | Facility: CLINIC | Age: 59
End: 2024-10-14
Payer: COMMERCIAL

## 2024-10-14 VITALS
WEIGHT: 221 LBS | HEART RATE: 87 BPM | BODY MASS INDEX: 33.49 KG/M2 | HEIGHT: 68 IN | SYSTOLIC BLOOD PRESSURE: 120 MMHG | DIASTOLIC BLOOD PRESSURE: 81 MMHG

## 2024-10-14 DIAGNOSIS — M17.12 PRIMARY OSTEOARTHRITIS OF LEFT KNEE: ICD-10-CM

## 2024-10-14 DIAGNOSIS — Z98.890 S/P LEFT KNEE ARTHROSCOPY: Primary | ICD-10-CM

## 2024-10-14 DIAGNOSIS — Z48.89 AFTERCARE FOLLOWING SURGERY: ICD-10-CM

## 2024-10-14 DIAGNOSIS — Z98.890 S/P LEFT KNEE ARTHROSCOPY: ICD-10-CM

## 2024-10-14 PROCEDURE — 20610 DRAIN/INJ JOINT/BURSA W/O US: CPT | Performed by: ORTHOPAEDIC SURGERY

## 2024-10-14 PROCEDURE — 99024 POSTOP FOLLOW-UP VISIT: CPT | Performed by: ORTHOPAEDIC SURGERY

## 2024-10-14 RX ORDER — BUPIVACAINE HYDROCHLORIDE 2.5 MG/ML
2 INJECTION, SOLUTION INFILTRATION; PERINEURAL
Status: COMPLETED | OUTPATIENT
Start: 2024-10-14 | End: 2024-10-14

## 2024-10-14 RX ORDER — LIDOCAINE HYDROCHLORIDE 10 MG/ML
2 INJECTION, SOLUTION INFILTRATION; PERINEURAL
Status: COMPLETED | OUTPATIENT
Start: 2024-10-14 | End: 2024-10-14

## 2024-10-14 RX ORDER — METHYLPREDNISOLONE ACETATE 40 MG/ML
2 INJECTION, SUSPENSION INTRA-ARTICULAR; INTRALESIONAL; INTRAMUSCULAR; SOFT TISSUE
Status: COMPLETED | OUTPATIENT
Start: 2024-10-14 | End: 2024-10-14

## 2024-10-14 RX ORDER — IBUPROFEN 800 MG/1
TABLET, FILM COATED ORAL
Qty: 30 TABLET | Refills: 0 | Status: SHIPPED | OUTPATIENT
Start: 2024-10-14

## 2024-10-14 RX ADMIN — LIDOCAINE HYDROCHLORIDE 2 ML: 10 INJECTION, SOLUTION INFILTRATION; PERINEURAL at 11:00

## 2024-10-14 RX ADMIN — BUPIVACAINE HYDROCHLORIDE 2 ML: 2.5 INJECTION, SOLUTION INFILTRATION; PERINEURAL at 11:00

## 2024-10-14 RX ADMIN — METHYLPREDNISOLONE ACETATE 2 ML: 40 INJECTION, SUSPENSION INTRA-ARTICULAR; INTRALESIONAL; INTRAMUSCULAR; SOFT TISSUE at 11:00

## 2024-10-14 NOTE — PROGRESS NOTES
Patient Name:  Cristhian Lee  MRN:  [id number]    Assessment & Plan     1. S/P left knee arthroscopy  -     Large joint arthrocentesis: L knee  2. Aftercare following surgery  -     Large joint arthrocentesis: L knee  3. Primary osteoarthritis of left knee  -     Large joint arthrocentesis: L knee      Approximately 6 week s/p Left knee arthroscopic partial medial menisectomy, chondroplasty medial femoral condyle performed on 09/04/2024  Advised patient his current symptoms are likely from new exercises with outpatient PT and getting back into normal activity  Offered CSI for Left knee to decrease acute pain from therapy and accepted. Risks discussed. Tolerated well. Can repeat in 3 months for pain relief.  Continue OTC medications  Advised patient to use the medial  brace for support during ambulation  Continue outpatient PT to tolerance. Encouraged patient to continue ROM exercises and strengthening   Follow up 6 weeks for reevaluation    History of the Present Illness   Cristhian Lee is a 59 y.o. male approximately 6 week s/p Left knee arthroscopic partial medial menisectomy, chondroplast medial femoral condyle performed on 09/04/2024. Today, patient reports he is feeling terrible. He has been attending outpatient PT and performing new exercises. He had a difficult time putting weight on his leg after that session. After getting out of bed last night, he admits to pain under the knee cap and having pain with ambulation. He has crutches with him today and using neoprene knee sleeve.       Review of Systems     Review of Systems   Constitutional:  Negative for chills and fever.   HENT:  Negative for ear pain and sore throat.    Eyes:  Negative for pain and visual disturbance.   Respiratory:  Negative for cough and shortness of breath.    Cardiovascular:  Negative for chest pain and palpitations.   Gastrointestinal:  Negative for abdominal pain and vomiting.   Genitourinary:  Negative for dysuria  "and hematuria.   Musculoskeletal:  Negative for arthralgias and back pain.   Skin:  Negative for color change and rash.   Neurological:  Negative for seizures and syncope.   All other systems reviewed and are negative.      Physical Exam     /81   Pulse 87   Ht 5' 8\" (1.727 m)   Wt 100 kg (221 lb)   BMI 33.60 kg/m²     Left Knee  Surgical incisions well healed without sign of infection  Range of motion from 0 to 125 degrees with minimal discomfort during motion.    There is trace post operative effusion.    There is residual tenderness over the medial joint line.    The patient is able to perform a straight leg raise with 4+/5 quad strength.     Varus stress testing reveals no pain or instability at 0 and 30 degrees   Valgus stress testing reveals no pain or instability at 0 and 30 degrees  Calf soft, compressible and nontender  The patient is neurovascular intact distally.      Data Review     I have personally reviewed pertinent films in PACS, and my interpretation follows.    No new images     Social History     Tobacco Use    Smoking status: Former     Passive exposure: Past    Smokeless tobacco: Never   Vaping Use    Vaping status: Never Used   Substance Use Topics    Alcohol use: No    Drug use: Never           Large joint arthrocentesis: L knee  Universal Protocol:  Risks and benefits: risks, benefits and alternatives were discussed  Consent given by: patient  Patient identity confirmed: verbally with patient  Procedure Details  Location: knee - L knee  Needle size: 22 G  Approach: lateral  Medications administered: 2 mL bupivacaine 0.25 %; 2 mL lidocaine 1 %; 2 mL methylPREDNISolone acetate 40 mg/mL    Patient tolerance: patient tolerated the procedure well with no immediate complications  Dressing:  Sterile dressing applied            Steve Fletcher DO"

## 2024-10-14 NOTE — TELEPHONE ENCOUNTER
Reason for call:   [x] Refill   [] Prior Auth  [] Other:     Office:   [] PCP/Provider -   [x] Specialty/Provider - [facility]     Medication: ibuprofen (MOTRIN) 800 mg tablet     Dose/Frequency: Take 1 tablet by mouth 3 times daily, then take as needed     Quantity: 30     Pharmacy: [id number]    Does the patient have enough for 3 days?   [] Yes   [x] No - Send as HP to POD

## 2024-10-15 ENCOUNTER — APPOINTMENT (OUTPATIENT)
Dept: PHYSICAL THERAPY | Facility: CLINIC | Age: 59
End: 2024-10-15
Payer: COMMERCIAL

## 2024-10-16 NOTE — PROGRESS NOTES
PT Re-Evaluation     Today's date: 10/16/24  Patient name: Cristhian Lee  : 1965  MRN: [id number]  Referring provider: Martha Morley PA-C  Dx:   Encounter Diagnosis     ICD-10-CM    1. Acute pain of left knee  M25.562       2. S/P left knee arthroscopy  Z98.890                       Assessment  Impairments: abnormal gait, abnormal or restricted ROM, activity intolerance, impaired physical strength, lacks appropriate home exercise program, pain with function and weight-bearing intolerance  Functional limitations: Antalgic gait, difficulty negotiating stairs reciprocally, and currently OOW.    Assessment details: Cristhian Lee is a 59 y.o. male referred with primary diagnosis of Acute pain of left knee  (primary encounter diagnosis)  S/P left knee arthroscopy .  Patient presents with the following functional limitations: Antalgic gait, difficulty negotiating stairs reciprocally, and currently OOW.  Patient demonstrates decreased ROM in the left knee, an antalgic gait pattern, and pain with twisting his knee.  Mild edema at anteromedial knee.  Treatment to include: Manual therapy techniques, extremity/core strengthening, neuromuscular control exercises, balance/proprioception training as appropriate, gait training, instruction in a comprehensive HEP, and modalities as needed.  They will benefit from skilled PT services to address the above functional deficits and to decrease pain to promote a return to their premorbid level of function.    Understanding of Dx/Px/POC: good     Prognosis: good  Prognosis details: Good    Goals  STG (4 weeks)  1. Patient will demonstrate 25% gains in knee ROM in all deficient planes  2. Patient will report pain as a 4-5/10 at worst with all normal activities  3. Patient will negotiate stairs with reciprocal pattern and single HR  LTG (8 weeks)  1. Patient will report pain as a 0-1/10 at worst with normal activities  2. Patient will ambulate community distances  without limitations related to pain.  3. Patient will demonstrate knee ROM WFL to restore prior level of function  4. Patient will demonstrate bilateral LE strength WNL to negotiate stairs reciprocally without HR  5. Patient will be independent and compliant with a HEP in order to maintain gains made with skilled PT services.      Plan  Patient would benefit from: skilled physical therapy  Planned modality interventions: cryotherapy    Planned therapy interventions: joint mobilization, IASTM, manual therapy, balance, patient education, neuromuscular re-education, gait training, strengthening, stretching, therapeutic activities, therapeutic exercise and home exercise program    Frequency: 2x week  Duration in weeks: 8  Plan of Care beginning date: 9/10/2024  Plan of Care expiration date: 2024  Treatment plan discussed with: patient      Subjective Evaluation    History of Present Illness  Date of onset: 2024  Mechanism of injury: Patient states he hurt his knee when running.  Symptoms got progressively worse and finally gave out when showering.  Patient is s/t left knee arthroscopy, partial medial meniscectomy, chondroplasty medial femoral condyle performed on 24.  He is walking without an assistive device and can go up/down stairs reciprocally with pain.  Patient is referred now to outpatient PT services.          Not a recurrent problem   Quality of life: fair    Patient Goals  Patient goals for therapy: increased strength and increased motion    Pain  Current pain ratin  At best pain ratin  At worst pain ratin  Location: Medial knee  Quality: knife-like and sharp    Social Support  Lives in: multiple-level home  Lives with: spouse    Employment status: not working (.  Currently OOW.  Trying to get full disability.)    Diagnostic Tests  MRI studies: abnormal    FCE comments: Negotiates stairs reciprocally with HR.  (+) sleep disturbances related to pain.Treatments  Previous  "treatment: medication  Current treatment: medication        Objective     Tenderness   Left Knee   Tenderness in the medial joint line and medial patella.     Active Range of Motion   Left Knee   Flexion: 125 degrees     Right Knee   Flexion: 138 degrees     Passive Range of Motion   Left Knee   Flexion: 128 degrees with pain    Strength/Myotome Testing     Left Knee   Flexion: 4+  Extension: 4+    Ambulation   Weight-Bearing Status   Weight-Bearing Status (Left): full weight bearing     Ambulation: Stairs   Ascend stairs: independent  Pattern: reciprocal  Railings: one rail  Descend stairs: independent  Pattern: reciprocal  Railings: one rail    Observational Gait   Gait: antalgic   Decreased walking speed and stride length.          Precautions: None    POC expires Unit limit Auth  expiration date PT/OT + Visit Limit?   11/5/24 NA 9/4/25 1                 Visit/Unit Tracking  AUTH Status:  Date 9/10 9/17 9/24 10/10           Pending Used 1 2 3 4            Remaining                  QR     Manuals 9/10 9/17 9/24 10/10                                                             Neuro Re-Ed             Pt education Knee anatomy and pathology            Star slider ex                                                                              Ther Ex             Bike L1x10' L1x10' L2x10' L2 10' Recumbent         Step HS stretch 3x30\" 3x30\" 3x30\" 3x30\"          Matrix quads 90-30  30# 2x10 30# 3x10 30# 3x10         Matrix HS  40# 2x10 40# 3x10 40#   3x10         [place] TKE  11# 5\" 2x10 11# 5\" 3x10 11#   5\" 3x10         Heel slides with strap  5\"x30 no strap           Supine SLR  3\" 3x10           Prostretch     3x30\"         Ther Activity             TG squats L22 3x10 L22 3x10 L22 3x10          Step ups F/L   6\" 2x10 L 6\" 2x10  F 4\" 2x10         Lateral step downs  4\"x10 4\"x5 4\"x10          STS     w/ airex and 2 foam pads 15x         Gait Training                                       Modalities                     "

## 2024-10-17 ENCOUNTER — APPOINTMENT (OUTPATIENT)
Dept: PHYSICAL THERAPY | Facility: CLINIC | Age: 59
End: 2024-10-17
Payer: COMMERCIAL

## 2024-10-17 DIAGNOSIS — Z98.890 S/P LEFT KNEE ARTHROSCOPY: ICD-10-CM

## 2024-10-17 DIAGNOSIS — M25.562 ACUTE PAIN OF LEFT KNEE: Primary | ICD-10-CM

## 2024-10-24 ENCOUNTER — TELEPHONE (OUTPATIENT)
Dept: PHYSICAL THERAPY | Facility: CLINIC | Age: 59
End: 2024-10-24

## 2024-10-24 NOTE — TELEPHONE ENCOUNTER
Called patient  at 3:46pm due to NS to PT appointment scheduled for 3:30pm on 10/24/24. Pt noted that he thought he canceled via my chart.     Pt noted that his leg is really bothering him and he was informed by dr that he should take a week or 2 off therapy.   He also noted he did have an injection not this past Monday but the Monday before.     Pt confirmed his next appointment scheduled for PT next week but noted if he does not feel better that he may have to cancel as well.     Advised pt that if the pain gets worse or he has further concerns to please let his dr aware.   Pt acknowledged and agreed with advise.

## 2024-10-28 ENCOUNTER — TELEPHONE (OUTPATIENT)
Age: 59
End: 2024-10-28

## 2024-10-28 ENCOUNTER — OFFICE VISIT (OUTPATIENT)
Dept: FAMILY MEDICINE CLINIC | Facility: CLINIC | Age: 59
End: 2024-10-28
Payer: COMMERCIAL

## 2024-10-28 VITALS
TEMPERATURE: 97.8 F | BODY MASS INDEX: 34.86 KG/M2 | OXYGEN SATURATION: 98 % | DIASTOLIC BLOOD PRESSURE: 82 MMHG | HEIGHT: 68 IN | WEIGHT: 230 LBS | HEART RATE: 84 BPM | SYSTOLIC BLOOD PRESSURE: 110 MMHG

## 2024-10-28 DIAGNOSIS — Z98.890 S/P LEFT KNEE ARTHROSCOPY: ICD-10-CM

## 2024-10-28 DIAGNOSIS — M75.32 CALCIFIC TENDINITIS OF LEFT SHOULDER: ICD-10-CM

## 2024-10-28 DIAGNOSIS — R53.81 PHYSICAL DEBILITY: ICD-10-CM

## 2024-10-28 DIAGNOSIS — F33.2 SEVERE EPISODE OF RECURRENT MAJOR DEPRESSIVE DISORDER, WITHOUT PSYCHOTIC FEATURES (HCC): Primary | ICD-10-CM

## 2024-10-28 DIAGNOSIS — S83.242D OTHER TEAR OF MEDIAL MENISCUS OF LEFT KNEE AS CURRENT INJURY, SUBSEQUENT ENCOUNTER: ICD-10-CM

## 2024-10-28 DIAGNOSIS — M17.12 PRIMARY OSTEOARTHRITIS OF LEFT KNEE: ICD-10-CM

## 2024-10-28 DIAGNOSIS — M54.12 RADICULOPATHY, CERVICAL REGION: ICD-10-CM

## 2024-10-28 PROBLEM — Z00.00 ANNUAL PHYSICAL EXAM: Status: RESOLVED | Noted: 2019-11-04 | Resolved: 2024-10-28

## 2024-10-28 PROBLEM — Z01.818 PRE-OPERATIVE CLEARANCE: Status: RESOLVED | Noted: 2024-08-26 | Resolved: 2024-10-28

## 2024-10-28 PROBLEM — Z23 NEEDS FLU SHOT: Status: RESOLVED | Noted: 2021-11-15 | Resolved: 2024-10-28

## 2024-10-28 PROCEDURE — 99213 OFFICE O/P EST LOW 20 MIN: CPT | Performed by: NURSE PRACTITIONER

## 2024-10-28 NOTE — ASSESSMENT & PLAN NOTE
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

## 2024-10-28 NOTE — PROGRESS NOTES
Ambulatory Visit  Name: Cristhian Lee      : 1965      MRN: [id number]  Encounter Provider: KAREL Sharma  Encounter Date: 10/28/2024   Encounter department: [facility]    Assessment & Plan  Radiculopathy, cervical region         Primary osteoarthritis of left knee         Other tear of medial meniscus of left knee as current injury, subsequent encounter  Patient following with orthopedics Dr. 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 Their PHQ-9 score was 19. Patient with underlying depression and was advised to continue current medications as prescribed.  DW patient will continue with Paxil 60 mg daily and is interested in seeing therapy referral placed   Orders:    Ambulatory referral to Psych Services; Future       History of Present Illness     Patient here today for follow up and reports that he is currently following with orthopedics Dr. Fletcher has been following with for his knee and had surgery on his left knee meniscus surgery and had steroid injection in the left knee. He continues with pain in the right knee and pain in his lower back and shoulder pain. Patient working at a job that is very physical demanding and difficult for him to work with his orthopedic issues. Patient is having issues with his work and is trying to manage and applying to get SSD. Patient filed that he is no longer able to do his job  "at this point in relation to his orthopedic conditions. Patient having issues with his depression as result of everything that he is going through. He is having issues with his family and father passed last year and going through some financial issues with his family         History obtained from : patient  Review of Systems   Constitutional:  Negative for activity change, appetite change, chills, diaphoresis, fatigue, fever and unexpected weight change.   HENT:  Negative for congestion, ear pain, hearing loss, postnasal drip, sinus pressure, sinus pain, sneezing and sore throat.    Eyes:  Negative for pain, redness and visual disturbance.   Respiratory:  Negative for cough and shortness of breath.    Cardiovascular:  Negative for chest pain and leg swelling.   Gastrointestinal:  Negative for abdominal pain, diarrhea, nausea and vomiting.   Endocrine: Negative.    Genitourinary: Negative.    Musculoskeletal:  Positive for arthralgias, back pain, gait problem and myalgias.   Allergic/Immunologic: Negative.    Neurological:  Negative for dizziness and light-headedness.   Hematological: Negative.    Psychiatric/Behavioral:  Positive for dysphoric mood. Negative for behavioral problems.          Objective     /82 (BP Location: Left arm, Patient Position: Sitting, Cuff Size: Large)   Pulse 84   Temp 97.8 °F (36.6 °C)   Ht 5' 8\" (1.727 m)   Wt 104 kg (230 lb)   SpO2 98%   BMI 34.97 kg/m²     Physical Exam  Vitals and nursing note reviewed.   Constitutional:       General: He is not in acute distress.     Appearance: He is well-developed.   HENT:      Head: Normocephalic and atraumatic.   Eyes:      Pupils: Pupils are equal, round, and reactive to light.   Neck:      Thyroid: No thyromegaly.   Cardiovascular:      Rate and Rhythm: Normal rate and regular rhythm.      Heart sounds: Normal heart sounds. No murmur heard.  Pulmonary:      Effort: Pulmonary effort is normal. No respiratory distress.      Breath " sounds: Normal breath sounds. No wheezing.   Abdominal:      General: Bowel sounds are normal.      Palpations: Abdomen is soft.   Musculoskeletal:      Right shoulder: Tenderness present. Decreased range of motion.      Left shoulder: Tenderness present. Decreased range of motion.      Cervical back: Normal range of motion.      Lumbar back: Spasms and tenderness present.      Right knee: Decreased range of motion. Tenderness present.      Left knee: Decreased range of motion. Tenderness present.      Comments: Left knee brace    Skin:     General: Skin is warm and dry.   Neurological:      Mental Status: He is alert and oriented to person, place, and time.   Psychiatric:         Attention and Perception: Attention normal.         Mood and Affect: Mood is anxious and depressed.         Speech: Speech normal.         Behavior: Behavior normal. Behavior is cooperative.         Thought Content: Thought content normal.         Cognition and Memory: Cognition and memory normal.         Judgment: Judgment normal.       PHQ-2/9 Depression Screening    Little interest or pleasure in doing things: 2 - more than half the days  Feeling down, depressed, or hopeless: 2 - more than half the days  Trouble falling or staying asleep, or sleeping too much: 3 - nearly every day  Feeling tired or having little energy: 2 - more than half the days  Poor appetite or overeating: 3 - nearly every day  Feeling bad about yourself - or that you are a failure or have let yourself or your family down: 3 - nearly every day  Trouble concentrating on things, such as reading the newspaper or watching television: 3 - nearly every day  Moving or speaking so slowly that other people could have noticed. Or the opposite - being so fidgety or restless that you have been moving around a lot more than usual: 0 - not at all  Thoughts that you would be better off dead, or of hurting yourself in some way: 1 - several days  PHQ-2 Score: 4  PHQ-2  Interpretation: POSITIVE depression screen  PHQ-9 Score: 19  PHQ-9 Interpretation: Moderately severe depression

## 2024-10-28 NOTE — ASSESSMENT & PLAN NOTE
Depression Screening Follow-up Plan: Patient's depression screening was positive with a PHQ-2 score of 4. Their PHQ-9 score was 19. Patient with underlying depression and was advised to continue current medications as prescribed.  DW patient will continue with Paxil 60 mg daily and is interested in seeing therapy referral placed   Orders:    Ambulatory referral to Psych Services; Future

## 2024-10-28 NOTE — TELEPHONE ENCOUNTER
"Behavioral Health Integration Screening Questionnaire     Are you aware of the referred from your [facility] Provider  : Yes     Please advise interviewee that they need to answer all questions truthfully to allow for best care, and any misrepresentations of information may affect their ability to be seen at this clinic   => Was this discussed? Yes     If Minor Child (under age 18)    Who is/are the legal guardian(s) of the child?     Is there a custody agreement? No     If \"YES\"- Custody orders must be obtained prior to scheduling the first appointment  In addition, Consent to Treatment must be signed by all legal guardians prior to scheduling the first appointment    If \"NO\"- Consent to Treatment must be signed by all legal guardians prior to scheduling the first appointment    Behavioral Health Outpatient Intake History -     Presenting Problem (in patient's own words): depression, serious things going on in his life.    Are there any communication barriers for this patient?     No                                               If yes, please describe barriers:       Are you taking any psychiatric medications? Yes     If \"YES\" -What are they Paxil     If \"YES\" -Who prescribes?     Has the Patient abused alcohol or other substances in the last 6 months ? No  No concerns of substance abuse are reported.     If \"YES\" -What substance, How much, How often?     If illegal substance: Refer to [facility] (for [place]) or [facility].   If Alcohol in excess of 10 drinks per week:  Refer to [facility] (for [place]) or [facility] Off[facility]    ACCEPTED as a patient Yes  If \"Yes\" Appointment Date: 12/5/2024 at 3:30 pm    Referred Elsewhere? No  If “Yes” - (Where? Ex: Therapy Anywhere; [facility] Program;  [facility], etc.)       Name of Insurance Co:[employer]  Insurance ID#[id number]  Insurance Phone #[phone]  If ins is primary or secondary?Primary  If patient is a minor, parents information such as " Name, NGUYEN of guarantor.

## 2024-10-31 ENCOUNTER — APPOINTMENT (OUTPATIENT)
Dept: PHYSICAL THERAPY | Facility: CLINIC | Age: 59
End: 2024-10-31
Payer: COMMERCIAL

## 2024-10-31 DIAGNOSIS — Z98.890 S/P LEFT KNEE ARTHROSCOPY: ICD-10-CM

## 2024-10-31 DIAGNOSIS — M25.562 ACUTE PAIN OF LEFT KNEE: Primary | ICD-10-CM

## 2024-10-31 NOTE — PROGRESS NOTES
PT Re-Evaluation     Today's date: 10/31/24  Patient name: Cristhian Lee  : 1965  MRN: [id number]  Referring provider: Martha Morley PA-C  Dx:   Encounter Diagnosis     ICD-10-CM    1. Acute pain of left knee  M25.562       2. S/P left knee arthroscopy  Z98.890                       Assessment  Impairments: abnormal gait, abnormal or restricted ROM, activity intolerance, impaired physical strength, lacks appropriate home exercise program, pain with function and weight-bearing intolerance  Functional limitations: Antalgic gait, difficulty negotiating stairs reciprocally, and currently OOW.    Assessment details: Cristhian Lee is a 59 y.o. male referred with primary diagnosis of Acute pain of left knee  (primary encounter diagnosis)  S/P left knee arthroscopy .  Patient presents with the following functional limitations: Antalgic gait, difficulty negotiating stairs reciprocally, and currently OOW.  Patient demonstrates decreased ROM in the left knee, an antalgic gait pattern, and pain with twisting his knee.  Mild edema at anteromedial knee.  Treatment to include: Manual therapy techniques, extremity/core strengthening, neuromuscular control exercises, balance/proprioception training as appropriate, gait training, instruction in a comprehensive HEP, and modalities as needed.  They will benefit from skilled PT services to address the above functional deficits and to decrease pain to promote a return to their premorbid level of function.    Understanding of Dx/Px/POC: good     Prognosis: good  Prognosis details: Good    Goals  STG (4 weeks)  1. Patient will demonstrate 25% gains in knee ROM in all deficient planes  2. Patient will report pain as a 4-5/10 at worst with all normal activities  3. Patient will negotiate stairs with reciprocal pattern and single HR  LTG (8 weeks)  1. Patient will report pain as a 0-1/10 at worst with normal activities  2. Patient will ambulate community distances  without limitations related to pain.  3. Patient will demonstrate knee ROM WFL to restore prior level of function  4. Patient will demonstrate bilateral LE strength WNL to negotiate stairs reciprocally without HR  5. Patient will be independent and compliant with a HEP in order to maintain gains made with skilled PT services.      Plan  Patient would benefit from: skilled physical therapy  Planned modality interventions: cryotherapy    Planned therapy interventions: joint mobilization, IASTM, manual therapy, balance, patient education, neuromuscular re-education, gait training, strengthening, stretching, therapeutic activities, therapeutic exercise and home exercise program    Frequency: 2x week  Duration in weeks: 8  Plan of Care beginning date: 9/10/2024  Plan of Care expiration date: 2024  Treatment plan discussed with: patient    Subjective Evaluation    History of Present Illness  Date of onset: 2024  Mechanism of injury: Patient was last seen in outpatient PT 3 weeks ago. He is now 8 weeks post-op and has attended only 4 PT sessions since beginning therapy.  Patient received a CSI in his left knee on 10/14/24.    Patient states he hurt his knee when running.  Symptoms got progressively worse and finally gave out when showering.  Patient is s/t left knee arthroscopy, partial medial meniscectomy, chondroplasty medial femoral condyle performed on 24.  He is walking without an assistive device and can go up/down stairs reciprocally with pain.  Patient is referred now to outpatient PT services.          Not a recurrent problem   Quality of life: fair    Patient Goals  Patient goals for therapy: increased strength and increased motion    Pain  Current pain ratin  At best pain ratin  At worst pain ratin  Location: Medial knee  Quality: knife-like and sharp    Social Support  Lives in: multiple-level home  Lives with: spouse    Employment status: not working (.  Currently OOW.   "Trying to get full disability.)    Diagnostic Tests  MRI studies: abnormal    FCE comments: Negotiates stairs reciprocally with HR.  (+) sleep disturbances related to pain.Treatments  Previous treatment: medication  Current treatment: medication      Objective     Tenderness   Left Knee   Tenderness in the medial joint line and medial patella.     Active Range of Motion   Left Knee   Flexion: 125 degrees     Right Knee   Flexion: 138 degrees     Passive Range of Motion   Left Knee   Flexion: 128 degrees with pain    Strength/Myotome Testing     Left Knee   Flexion: 4+  Extension: 4+    Ambulation   Weight-Bearing Status   Weight-Bearing Status (Left): full weight bearing     Ambulation: Stairs   Ascend stairs: independent  Pattern: reciprocal  Railings: one rail  Descend stairs: independent  Pattern: reciprocal  Railings: one rail    Observational Gait   Gait: antalgic   Decreased walking speed and stride length.          Precautions: None    POC expires Unit limit Auth  expiration date PT/OT + Visit Limit?   11/5/24 NA 9/4/25 1                 Visit/Unit Tracking  AUTH Status:  Date 9/10 9/17 9/24 10/10           Pending Used 1 2 3 4            Remaining                  QR     Manuals 9/10 9/17 9/24 10/10                                                             Neuro Re-Ed             Pt education Knee anatomy and pathology            Star slider ex                                                                              Ther Ex             Bike L1x10' L1x10' L2x10' L2 10' Recumbent         Step HS stretch 3x30\" 3x30\" 3x30\" 3x30\"          Matrix quads 90-30  30# 2x10 30# 3x10 30# 3x10         Matrix HS  40# 2x10 40# 3x10 40#   3x10         Jake TKE  11# 5\" 2x10 11# 5\" 3x10 11#   5\" 3x10         Heel slides with strap  5\"x30 no strap           Supine SLR  3\" 3x10           Prostretch     3x30\"         Ther Activity             TG squats L22 3x10 L22 3x10 L22 3x10          Step ups F/L   6\" 2x10 L 6\" " "2x10  F 4\" 2x10         Lateral step downs  4\"x10 4\"x5 4\"x10          STS     w/ airex and 2 foam pads 15x         Gait Training                                       Modalities                                            "

## 2024-11-08 ENCOUNTER — TELEPHONE (OUTPATIENT)
Age: 59
End: 2024-11-08

## 2024-11-08 NOTE — TELEPHONE ENCOUNTER
Caller: Candido    Doctor: Justine    Reason for call: Patient is calling to request his paperwork that he dropped off in office yesterday be filled out ASAP. I advised patient this process can take up to 10-14 business days for forms to be completed.     Call back#: [phone]

## 2024-11-11 NOTE — TELEPHONE ENCOUNTER
Caller: Patient    Doctor: Justine    Reason for call: Patient questioned status of paper work dropped off 11/7? Advised being processed. 10-14 days for completion. Patient request that forms be placed in [employer] and please contact to advise when they are completed    Call back#: [phone]

## 2024-11-12 DIAGNOSIS — M54.50 LUMBAR PAIN: Primary | ICD-10-CM

## 2024-11-12 RX ORDER — METHOCARBAMOL 500 MG/1
500 TABLET, FILM COATED ORAL 4 TIMES DAILY
Qty: 45 TABLET | Refills: 0 | Status: SHIPPED | OUTPATIENT
Start: 2024-11-12 | End: 2024-11-27

## 2024-11-15 ENCOUNTER — OFFICE VISIT (OUTPATIENT)
Dept: OBGYN CLINIC | Facility: CLINIC | Age: 59
End: 2024-11-15

## 2024-11-15 VITALS
DIASTOLIC BLOOD PRESSURE: 86 MMHG | HEART RATE: 82 BPM | SYSTOLIC BLOOD PRESSURE: 128 MMHG | BODY MASS INDEX: 34.1 KG/M2 | WEIGHT: 225 LBS | HEIGHT: 68 IN

## 2024-11-15 DIAGNOSIS — M23.91 INTERNAL DERANGEMENT OF RIGHT KNEE: ICD-10-CM

## 2024-11-15 DIAGNOSIS — M25.561 ACUTE PAIN OF RIGHT KNEE: ICD-10-CM

## 2024-11-15 DIAGNOSIS — Z98.890 S/P LEFT KNEE ARTHROSCOPY: Primary | ICD-10-CM

## 2024-11-15 PROCEDURE — 99024 POSTOP FOLLOW-UP VISIT: CPT | Performed by: ORTHOPAEDIC SURGERY

## 2024-11-15 NOTE — PROGRESS NOTES
Patient Name:  Cristhian Lee  MRN:  [id number]    Assessment & Plan     1. S/P left knee arthroscopy  2. Internal derangement of right knee  -     MRI knee right  wo contrast; Future; Expected date: 11/15/2024  3. Acute pain of right knee        Approximately 10 weeks s/p Left knee arthroscopic partial medial menisectomy, chondroplasty medial femoral condyle performed on 09/04/2024, internal derangement right knee  Patient to be full weightbearing to BLE (Bilateral Lower Extremity)  ROM as tolerated to  BLE (Bilateral Lower Extremity)  Discussed with patient he is to continue with compression sleeves,  bracing,  physical therapy for strengthening.   An updated MRI study was ordered for the right knee due to continued symptoms at the medial aspect of the knee.  Patient to continue at home analgesic regimen with Tylenol   Patient is to remain out of work tentatively for an additional 4 weeks. Disability paperwork was filled out for the patient today.   Patient to follow up upon completion of MRI study.       History of the Present Illness   Cristhian Lee is a 59 y.o. male approximately 10 weeks s/p Left knee arthroscopic partial medial menisectomy, chondroplasty medial femoral condyle performed on 09/04/2024 with recent corticosteroid injection performed on 10/14/24. The patient is improving. He does have mild discomfort in the medial aspect of the knee. He continues to wear compressive sleeves and a medial  brace. He is having discomfort in the right knee. Pain is located at the medial aspect of the knee and feels similar to how his left knee felt preoperatively.        Review of Systems     Review of Systems   Constitutional:  Negative for chills and fever.   HENT:  Negative for ear pain and sore throat.    Eyes:  Negative for pain and visual disturbance.   Respiratory:  Negative for cough and shortness of breath.    Cardiovascular:  Negative for chest pain and palpitations.  "  Gastrointestinal:  Negative for abdominal pain and vomiting.   Genitourinary:  Negative for dysuria and hematuria.   Musculoskeletal:  Negative for arthralgias and back pain.   Skin:  Negative for color change and rash.   Neurological:  Negative for seizures and syncope.   All other systems reviewed and are negative.      Physical Exam     /86   Pulse 82   Ht 5' 8\" (1.727 m)   Wt 102 kg (225 lb)   BMI 34.21 kg/m²     Left Knee  Surgical incisions well healed without sign of infection  Range of motion from 0 to 120 degree.  There is no effusion.    There is residual tenderness over the medial joint line.    The patient is able to perform a straight leg raise with 4+/5 quad strength.     Varus stress testing reveals no pain or instability at 0 and 30 degrees   Valgus stress testing reveals no pain or instability at 0 and 30 degrees  Calf soft, compressible and nontender  The patient is neurovascular intact distally.    Right Knee  Range of motion from 0 to 120.    There is no crepitus with range of motion.   There is no effusion.    There is tenderness over the medial joint line.    The patient is able to perform a straight leg raise.    Varus stress testing reveals no instability at 0 and 30 degrees   Valgus stress testing reveals no instability at 0 and 30 degrees  Edgar's testing is positive    The patient is neurovascular intact distally.      Data Review     I have personally reviewed pertinent films in PACS, and my interpretation follows.    X-rays taken 6/17/2024 of Left knee independently reviewed and demonstrate well preserved joint spaces. No acute fracture or dislocation.      MRI performed 06/24/2024 of Left knee demonstrates medial meniscus posterior root tear, mild medial compartment degenerative changes with partial thickness cartilage loss.      X-rays of the right knee obtained 8/23/2024 demonstrate well preserved joint spaces. No acute fracture or dislocation.    Social History "     Tobacco Use    Smoking status: Former     Passive exposure: Past    Smokeless tobacco: Never   Vaping Use    Vaping status: Never Used   Substance Use Topics    Alcohol use: No    Drug use: Never           Procedures  None performed.     Steve Fletcher DO   Scribe Attestation      I,:  Prudence Edge am acting as a scribe while in the presence of the attending physician.:       I,:  Steve Fletcher DO personally performed the services described in this documentation    as scribed in my presence.:

## 2024-11-19 ENCOUNTER — OFFICE VISIT (OUTPATIENT)
Dept: FAMILY MEDICINE CLINIC | Facility: CLINIC | Age: 59
End: 2024-11-19
Payer: COMMERCIAL

## 2024-11-19 VITALS
SYSTOLIC BLOOD PRESSURE: 128 MMHG | HEIGHT: 68 IN | BODY MASS INDEX: 34.1 KG/M2 | HEART RATE: 82 BPM | TEMPERATURE: 97.8 F | WEIGHT: 225 LBS | OXYGEN SATURATION: 97 % | DIASTOLIC BLOOD PRESSURE: 86 MMHG

## 2024-11-19 DIAGNOSIS — Z00.00 ANNUAL PHYSICAL EXAM: Primary | ICD-10-CM

## 2024-11-19 DIAGNOSIS — E55.9 VITAMIN D DEFICIENCY: ICD-10-CM

## 2024-11-19 DIAGNOSIS — K21.9 GASTROESOPHAGEAL REFLUX DISEASE WITHOUT ESOPHAGITIS: ICD-10-CM

## 2024-11-19 DIAGNOSIS — E78.2 MIXED HYPERLIPIDEMIA: ICD-10-CM

## 2024-11-19 DIAGNOSIS — Z12.5 SCREENING FOR PROSTATE CANCER: ICD-10-CM

## 2024-11-19 DIAGNOSIS — Z12.11 SCREENING FOR COLON CANCER: ICD-10-CM

## 2024-11-19 PROCEDURE — 99396 PREV VISIT EST AGE 40-64: CPT | Performed by: NURSE PRACTITIONER

## 2024-11-19 NOTE — PATIENT INSTRUCTIONS
"Patient Education     Routine physical for adults   The Basics   Written by the doctors and editors at [facility]   What is a physical? -- A physical is a routine visit, or \"check-up,\" with your doctor. You might also hear it called a \"wellness visit\" or \"preventive visit.\"  During each visit, the doctor will:   Ask about your physical and mental health   Ask about your habits, behaviors, and lifestyle   Do an exam   Give you vaccines if needed   Talk to you about any medicines you take   Give advice about your health   Answer your questions  Getting regular check-ups is an important part of taking care of your health. It can help your doctor find and treat any problems you have. But it's also important for preventing health problems.  A routine physical is different from a \"sick visit.\" A sick visit is when you see a doctor because of a health concern or problem. Since physicals are scheduled ahead of time, you can think about what you want to ask the doctor.  How often should I get a physical? -- It depends on your age and health. In general, for people age 21 years and older:   If you are younger than 50 years, you might be able to get a physical every 3 years.   If you are 50 years or older, your doctor might recommend a physical every year.  If you have an ongoing health condition, like diabetes or high blood pressure, your doctor will probably want to see you more often.  What happens during a physical? -- In general, each visit will include:   Physical exam - The doctor or nurse will check your height, weight, heart rate, and blood pressure. They will also look at your eyes and ears. They will ask about how you are feeling and whether you have any symptoms that bother you.   Medicines - It's a good idea to bring a list of all the medicines you take to each doctor visit. Your doctor will talk to you about your medicines and answer any questions. Tell them if you are having any side effects that bother you. You " "should also tell them if you are having trouble paying for any of your medicines.   Habits and behaviors - This includes:   Your diet   Your exercise habits   Whether you smoke, drink alcohol, or use drugs   Whether you are sexually active   Whether you feel safe at home  Your doctor will talk to you about things you can do to improve your health and lower your risk of health problems. They will also offer help and support. For example, if you want to quit smoking, they can give you advice and might prescribe medicines. If you want to improve your diet or get more physical activity, they can help you with this, too.   Lab tests, if needed - The tests you get will depend on your age and situation. For example, your doctor might want to check your:   Cholesterol   Blood sugar   Iron level   Vaccines - The recommended vaccines will depend on your age, health, and what vaccines you already had. Vaccines are very important because they can prevent certain serious or deadly infections.   Discussion of screening - \"Screening\" means checking for diseases or other health problems before they cause symptoms. Your doctor can recommend screening based on your age, risk, and preferences. This might include tests to check for:   Cancer, such as breast, prostate, cervical, ovarian, colorectal, prostate, lung, or skin cancer   Sexually transmitted infections, such as chlamydia and gonorrhea   Mental health conditions like depression and anxiety  Your doctor will talk to you about the different types of screening tests. They can help you decide which screenings to have. They can also explain what the results might mean.   Answering questions - The physical is a good time to ask the doctor or nurse questions about your health. If needed, they can refer you to other doctors or specialists, too.  Adults older than 65 years often need other care, too. As you get older, your doctor will talk to you about:   How to prevent falling at " home   Hearing or vision tests   Memory testing   How to take your medicines safely   Making sure that you have the help and support you need at home  All topics are updated as new evidence becomes available and our peer review process is complete.  This topic retrieved from [employer] on: May 02, 2024.  Topic 809700 Version 1.0  Release: 32.4.3 - C32.122  © 2024 UpToDate, Inc. and/or its affiliates. All rights reserved.  Consumer Information Use and Disclaimer   Disclaimer: This generalized information is a limited summary of diagnosis, treatment, and/or medication information. It is not meant to be comprehensive and should be used as a tool to help the user understand and/or assess potential diagnostic and treatment options. It does NOT include all information about conditions, treatments, medications, side effects, or risks that may apply to a specific patient. It is not intended to be medical advice or a substitute for the medical advice, diagnosis, or treatment of a health care provider based on the health care provider's examination and assessment of a patient's specific and unique circumstances. Patients must speak with a health care provider for complete information about their health, medical questions, and treatment options, including any risks or benefits regarding use of medications. This information does not endorse any treatments or medications as safe, effective, or approved for treating a specific patient. UpToDate, Inc. and its affiliates disclaim any warranty or liability relating to this information or the use thereof.The use of this information is governed by the Terms of Use, available at https://www.woltersMcAfeeuwer.com/en/know/clinical-effectiveness-terms. 2024© UpToDate, Inc. and its affiliates and/or licensors. All rights reserved.  Copyright   © 2024 UpToDate, Inc. and/or its affiliates. All rights reserved.

## 2024-11-19 NOTE — PROGRESS NOTES
Adult Annual Physical  Name: Cristhian Lee      : 1965      MRN: [id number]  Encounter Provider: KAREL Sharma  Encounter Date: 2024   Encounter department: [facility]    Assessment & Plan  Annual physical exam         Gastroesophageal reflux disease without esophagitis    Orders:    Ambulatory Referral to Gastroenterology; Future    Screening for colon cancer    Orders:    Ambulatory Referral to Gastroenterology; Future    Mixed hyperlipidemia    Orders:    Lipid panel; Future    Screening for prostate cancer    Orders:    PSA, Total Screen; Future    Vitamin D deficiency    Orders:    Vitamin D 25 hydroxy; Future  Taking Vitamin D weekly   Immunizations and preventive care screenings were discussed with patient today. Appropriate education was printed on patient's after visit summary.    Discussed risks and benefits of prostate cancer screening. We discussed the controversial history of PSA screening for prostate cancer in the [place] as well as the risk of over detection and over treatment of prostate cancer by way of PSA screening.  The patient understands that PSA blood testing is an imperfect way to screen for prostate cancer and that elevated PSA levels in the blood may also be caused by infection, inflammation, prostatic trauma or manipulation, urological procedures, or by benign prostatic enlargement.    The role of the digital rectal examination in prostate cancer screening was also discussed and I discussed with him that there is large interobserver variability in the findings of digital rectal examination.    Counseling:  Dental Health: discussed importance of regular tooth brushing, flossing, and dental visits.  Injury prevention: discussed safety/seat belts, safety helmets, smoke detectors, carbon monoxide detectors, and smoking near bedding or upholstery.         History of Present Illness     Adult Annual Physical:  Patient presents for  "annual physical. Patient here today for his annual physical. Patient is learning to play the [employer]. He is having troubles with his knees and lower back and is following with disability specialists for his knees and back. .     Diet and Physical Activity:  - Diet/Nutrition: well balanced diet, limited junk food and portion control.  - Exercise: walking.    Depression Screening:    - PHQ-9 Score: 4    General Health:  - Sleep: sleeps well.  - Hearing: normal hearing bilateral ears.  - Vision: goes for regular eye exams, most recent eye exam < 1 year ago and wears glasses.  - Dental: no dental visits for > 1 year and brushes teeth twice daily.     Health:    - Urinary symptoms: none.     Review of Systems   Constitutional:  Negative for activity change, appetite change, chills, diaphoresis, fatigue, fever and unexpected weight change.   HENT:  Negative for congestion, ear pain, hearing loss, postnasal drip, sinus pressure, sinus pain, sneezing and sore throat.    Eyes:  Negative for pain, redness and visual disturbance.   Respiratory:  Negative for cough and shortness of breath.    Cardiovascular:  Negative for chest pain and leg swelling.   Gastrointestinal:  Negative for abdominal pain, diarrhea, nausea and vomiting.   Musculoskeletal:  Negative for arthralgias.   Neurological:  Negative for dizziness and light-headedness.   Psychiatric/Behavioral:  Negative for behavioral problems and dysphoric mood.        Objective   /86 (BP Location: Left arm, Patient Position: Sitting, Cuff Size: Large)   Pulse 82   Temp 97.8 °F (36.6 °C)   Ht 5' 8\" (1.727 m)   Wt 102 kg (225 lb)   SpO2 97%   BMI 34.21 kg/m²     Physical Exam  Constitutional:       General: He is not in acute distress.     Appearance: He is well-developed.   HENT:      Head: Normocephalic and atraumatic.   Eyes:      Pupils: Pupils are equal, round, and reactive to light.   Neck:      Thyroid: No thyromegaly.   Cardiovascular:      Rate and " Rhythm: Normal rate and regular rhythm.      Heart sounds: Normal heart sounds. No murmur heard.  Pulmonary:      Effort: Pulmonary effort is normal. No respiratory distress.      Breath sounds: Normal breath sounds. No wheezing.   Abdominal:      General: Bowel sounds are normal.      Palpations: Abdomen is soft.   Musculoskeletal:         General: Normal range of motion.      Cervical back: Normal range of motion.      Lumbar back: Spasms and tenderness present.      Left lower leg: Tenderness present.      Comments: Wearing knee brace    Skin:     General: Skin is warm and dry.   Neurological:      Mental Status: He is alert and oriented to person, place, and time.   Psychiatric:         Attention and Perception: Attention normal.         Mood and Affect: Mood normal.         Speech: Speech normal.         Behavior: Behavior normal.         Thought Content: Thought content normal.         Cognition and Memory: Cognition normal.         Judgment: Judgment normal.

## 2024-11-20 ENCOUNTER — DOCUMENTATION (OUTPATIENT)
Dept: OBGYN CLINIC | Facility: CLINIC | Age: 59
End: 2024-11-20

## 2024-11-21 ENCOUNTER — TELEPHONE (OUTPATIENT)
Dept: OBGYN CLINIC | Facility: CLINIC | Age: 59
End: 2024-11-21

## 2024-11-21 ENCOUNTER — TELEPHONE (OUTPATIENT)
Age: 59
End: 2024-11-21

## 2024-11-21 NOTE — TELEPHONE ENCOUNTER
Caller: Candido     Doctor: Justine    Reason for call: Patient states Dr. Fletcher only filled out his form to be out of work until December 16 . Patient states he will need this extended since his MRI is not till end of Dec Patient states he's going to bring paperwork in to the office the week of Dec 2 so there is time to process.     Call back#: [phone]

## 2024-12-05 ENCOUNTER — OFFICE VISIT (OUTPATIENT)
Dept: BEHAVIORAL/MENTAL HEALTH CLINIC | Facility: CLINIC | Age: 59
End: 2024-12-05
Payer: COMMERCIAL

## 2024-12-05 DIAGNOSIS — F33.2 SEVERE EPISODE OF RECURRENT MAJOR DEPRESSIVE DISORDER, WITHOUT PSYCHOTIC FEATURES (HCC): Primary | ICD-10-CM

## 2024-12-05 PROCEDURE — 90791 PSYCH DIAGNOSTIC EVALUATION: CPT | Performed by: SOCIAL WORKER
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
## 2024-12-05 NOTE — BH CRISIS PLAN
Client Name: Candido Lee       Client YOB: 1965    PankajAshwin Safety Plan      Creation Date: 12/5/24 Update Date: 12/4/25   Created By: Angela Gimenez       Step 1: Warning Signs:   Warning Signs   Get dark   Get really down            Step 2: Internal Coping Strategies:   Internal Coping Strategies   Praying   Play guitar            Step 3: People and social settings that provide distraction:   Name Contact Information   Pat (wife) In cell phone    Places   Wood shop in the garage           Step 4: People whom I can ask for help during a crisis:      Name Contact Information    Pat (wife) In cell phone    Jyoti (daughter) In cell phone      Step 5: Professionals or agencies I can contact during a crisis:      Clinican/Agency Name Phone Emergency Contact    None        Local Emergency Department Emergency Department Phone Emergency Department Address    [phone] [facility] [address]        Crisis Phone Numbers:   Suicide Prevention Lifeline: Call or Text  988 Crisis Text Line: Text HOME to 948-628   Please note: Some [facility] do not have a separate number for Child/Adolescent specific crisis. If your county is not listed under Child/Adolescent, please call the adult number for your county      Adult Crisis Numbers: Child/Adolescent Crisis Numbers   [facility]: [phone] [facility]: [phone]   [facility]: [phone] [facility]: [phone]   [facility]: [phone] [place]: [phone]   [facility]: [phone] [place]: [phone]   [facility]: [phone]   [facility]: [phone]   [facility]: [phone]   [facility]: [phone] (daytime) [phone] (after hours, weekends, holidays)      Step 6: Making the environment safer (plan for lethal means safety):   Plan: He does have access to guns but they secured-denies any SI     Optional: What is  "most important to me and worth living for?   \"My kids, family, my radha, my wife and everything\"     Smooth Safety Plan. Luisa Lira and Donny Christopher. Used with permission of the authors.           "

## 2024-12-05 NOTE — BH TREATMENT PLAN
"Outpatient Behavioral Health Psychotherapy Treatment Plan    Candido Lee  1965     Date of Initial Psychotherapy Assessment: 12/05/25   Date of Current Treatment Plan: 12/05/24  Treatment Plan Target Date: TBD  Treatment Plan Expiration Date: 06/04/24    Diagnosis:   1. Severe episode of recurrent major depressive disorder, without psychotic features ([facility])  Ambulatory referral to Psych Services          Area(s) of Need: Depressed and trying to process his childhood trauma and how it affects current day functioning.     Long Term Goal 1 (in the client's own words): \"I want to be mentally free from this whole situation\"    Stage of Change: Preparation    Target Date for completion: TBD     Anticipated therapeutic modalities: CBT, CPT, ACT possible EMDR     People identified to complete this goal: Therapist and Candido      Objective 1: (identify the means of measuring success in meeting the objective): Process the trauma associated with his childhood in as much detail as possibly with causing distress to Candido.      Objective 2: (identify the means of measuring success in meeting the objective): Candido will work on reaching a level of acceptance of his past so that current day functioning is not impaired by the thoughts, through the use of processing in biweekly sessions.        Long Term Goal 2 (in the client's own words): Medication Management    Stage of Change: Action    Target Date for completion: 06/04/2025     Anticipated therapeutic modalities: Medication management     People identified to complete this goal: PCP and Candido      Objective 1: (identify the means of measuring success in meeting the objective): Take mediations as prescribed      Objective 2: (identify the means of measuring success in meeting the objective): Attend appointments as scheduled and process efficacy of medications.      I am currently under the care of a [facility] psychiatric provider: no    My [facility] psychiatric provider " "is: N/A    I am currently taking psychiatric medications: Yes, as prescribed    I feel that I will be ready for discharge from mental health care when I reach the following (measurable goal/objective): \"Put this in its place and move on from it, accept it for what it is and walk away.\"    For children and adults who have a legal guardian:   Has there been any change to custody orders and/or guardianship status? NA. If yes, attach updated documentation.    I have created my Crisis Plan and have been offered a copy of this plan    Behavioral Health Treatment Plan  [facility]: Diagnosis and Treatment Plan explained to Candido Lee Candido TAYA Lee acknowledges an understanding of their diagnosis. Candido BAIN Gonzalokleber agrees to this treatment plan.    I have been offered a copy of this Treatment Plan. yes      Electronically Signed by Angela Gimenez    "

## 2024-12-17 ENCOUNTER — APPOINTMENT (OUTPATIENT)
Dept: LAB | Facility: CLINIC | Age: 59
End: 2024-12-17
Payer: COMMERCIAL

## 2024-12-17 DIAGNOSIS — Z12.5 SCREENING FOR PROSTATE CANCER: ICD-10-CM

## 2024-12-17 DIAGNOSIS — E55.9 VITAMIN D DEFICIENCY: ICD-10-CM

## 2024-12-17 DIAGNOSIS — E78.2 MIXED HYPERLIPIDEMIA: ICD-10-CM

## 2024-12-17 LAB
25(OH)D3 SERPL-MCNC: 32.2 NG/ML (ref 30–100)
CHOLEST SERPL-MCNC: 221 MG/DL (ref ?–200)
HDLC SERPL-MCNC: 51 MG/DL
LDLC SERPL CALC-MCNC: 139 MG/DL (ref 0–100)
NONHDLC SERPL-MCNC: 170 MG/DL
PSA SERPL-MCNC: 0.61 NG/ML (ref 0–4)
TRIGL SERPL-MCNC: 157 MG/DL (ref ?–150)

## 2024-12-17 PROCEDURE — 80061 LIPID PANEL: CPT

## 2024-12-17 PROCEDURE — 36415 COLL VENOUS BLD VENIPUNCTURE: CPT

## 2024-12-17 PROCEDURE — 82306 VITAMIN D 25 HYDROXY: CPT

## 2024-12-17 PROCEDURE — G0103 PSA SCREENING: HCPCS

## 2024-12-18 ENCOUNTER — RESULTS FOLLOW-UP (OUTPATIENT)
Dept: FAMILY MEDICINE CLINIC | Facility: CLINIC | Age: 59
End: 2024-12-18

## 2024-12-19 ENCOUNTER — SOCIAL WORK (OUTPATIENT)
Dept: BEHAVIORAL/MENTAL HEALTH CLINIC | Facility: CLINIC | Age: 59
End: 2024-12-19
Payer: COMMERCIAL

## 2024-12-19 DIAGNOSIS — F33.2 SEVERE EPISODE OF RECURRENT MAJOR DEPRESSIVE DISORDER, WITHOUT PSYCHOTIC FEATURES (HCC): ICD-10-CM

## 2024-12-19 DIAGNOSIS — F41.1 GENERALIZED ANXIETY DISORDER: Primary | ICD-10-CM

## 2024-12-19 PROCEDURE — 90834 PSYTX W PT 45 MINUTES: CPT | Performed by: SOCIAL WORKER

## 2024-12-19 NOTE — PSYCH
Behavioral Health Psychotherapy Progress Note    Psychotherapy Provided: Individual Psychotherapy     1. Generalized anxiety disorder        2. Severe episode of recurrent major depressive disorder, without psychotic features (HCC)            Goals addressed in session: Goal 1     DATA: Candido states that his parents tried to gaslight him on a regular basis.  He gave examples.  We processed at length.  He grew up in an extremely liberal [place] orth[place]. His sister  by suicide via overdose.  She was supposed to go to [facility] and he feels that she did not want to go to [facility].  She stole a prescription pad from a doctors office and she was caught at the pharmacy trying to pass a forged prescription.  She was 43 years old and has been gone since .  He says she was mad at him as he was saved and he got his life together. He says that his parents felt like they had to give everything to his sisters child.  He has not talked to his mother since mothers day.  He remains angry at the situation over the inheritance.  His mother is not in good shape and they sold the house in [place] for $850, 000 and bought another house and the house is in his sisters kid's name.  His niece is now his nephew and he is against it and he feels this is an abomination.  His sister has another daughter who is  with a child.  He states he was supposed to get 50% according to the will and each one of his nieces (nephew) 25%.  Now with his mom having dementia the will cannot be changed.  He says he feels like people think he only wants the money.  He is struggling financially.  There are a lot of hard feelings.  He is stuck in a 40 year mortgage as the bank worked with him to have them keep their house.  His wife recently got a job, he is on disability and he is retiring.  He states he feels like the doctor is not helping him with his other knee.  He is applying for SSDI and he is going with his PCP to fill out the paperwork.  He says he can hardly  "walk without the brace on his left knee.  He is going to another network to get his other knee looked at.  He says that him trying to get in touch with doctors offices is depressing.  He states that when he had his vasectomy, he had post vasectomy pain syndrome.  He says it was so painful and the urologist told him not to come back.  He is attending the MRI tomorrow.  Candido was allowed to vent and he was able to discuss his pains with previous surgeries at length.  Support was offered as well as validation of feelings.     During this session, this clinician used the following therapeutic modalities: Cognitive Behavioral Therapy, Mindfulness-based Strategies, Supportive Psychotherapy, and ACT    Substance Abuse was not addressed during this session. If the client is diagnosed with a co-occurring substance use disorder, please indicate any changes in the frequency or amount of use: N/A. Stage of change for addressing substance use diagnoses: No substance use/Not applicable    ASSESSMENT:  Candido Lee presents with a Euthymic/ normal mood his affect is Normal range and intensity, which is congruent, with his mood and the content of the session. The client has made progress on their goals.     Candido Lee presents with a minimal risk of suicide, minimal risk of self-harm, and minimal risk of harm to others.    For any risk assessment that surpasses a \"low\" rating, a safety plan must be developed.    A safety plan was indicated: no  If yes, describe in detail N/A    PLAN: Between sessions, Candido Lee will try and use breathing techniques to relax. At the next session, the therapist will use Cognitive Behavioral Therapy, Mindfulness-based Strategies, Supportive Psychotherapy, and ACT  to address his lack of acceptance. .    Behavioral Health Treatment Plan and Discharge Planning: Candido Lee is aware of and agrees to continue to work on their treatment plan. They have identified and are working toward " their discharge goals. yes    Depression Follow-up Plan Completed: Not applicable    Visit start and stop times:    12/19/24  Start Time: 1525  Stop Time: 1612  Total Visit Time: 47 minutes

## 2024-12-20 ENCOUNTER — HOSPITAL ENCOUNTER (OUTPATIENT)
Dept: MRI IMAGING | Facility: CLINIC | Age: 59
Discharge: HOME | End: 2024-12-20
Payer: COMMERCIAL

## 2024-12-20 DIAGNOSIS — M23.91 INTERNAL DERANGEMENT OF RIGHT KNEE: ICD-10-CM

## 2024-12-20 PROCEDURE — 73721 MRI JNT OF LWR EXTRE W/O DYE: CPT

## 2024-12-22 DIAGNOSIS — N40.1 BPH WITH OBSTRUCTION/LOWER URINARY TRACT SYMPTOMS: ICD-10-CM

## 2024-12-22 DIAGNOSIS — N13.8 BPH WITH OBSTRUCTION/LOWER URINARY TRACT SYMPTOMS: ICD-10-CM

## 2024-12-23 DIAGNOSIS — N40.1 BPH WITH OBSTRUCTION/LOWER URINARY TRACT SYMPTOMS: ICD-10-CM

## 2024-12-23 DIAGNOSIS — N13.8 BPH WITH OBSTRUCTION/LOWER URINARY TRACT SYMPTOMS: ICD-10-CM

## 2024-12-23 RX ORDER — TAMSULOSIN HYDROCHLORIDE 0.4 MG/1
0.8 CAPSULE ORAL
Qty: 180 CAPSULE | Refills: 3 | Status: SHIPPED | OUTPATIENT
Start: 2024-12-23 | End: 2025-12-23

## 2024-12-23 RX ORDER — TAMSULOSIN HYDROCHLORIDE 0.4 MG/1
0.8 CAPSULE ORAL
Qty: 28 CAPSULE | Refills: 0 | Status: SHIPPED | OUTPATIENT
Start: 2024-12-23 | End: 2025-01-06

## 2024-12-23 RX ORDER — TAMSULOSIN HYDROCHLORIDE 0.4 MG/1
CAPSULE ORAL
Qty: 90 CAPSULE | Refills: 7 | OUTPATIENT
Start: 2024-12-23

## 2024-12-23 RX ORDER — TADALAFIL 5 MG/1
5 TABLET ORAL DAILY PRN
Qty: 90 TABLET | Refills: 3 | Status: SHIPPED | OUTPATIENT
Start: 2024-12-23

## 2024-12-27 DIAGNOSIS — J32.0 CHRONIC MAXILLARY SINUSITIS: Primary | ICD-10-CM

## 2024-12-27 RX ORDER — PREDNISONE 20 MG/1
TABLET ORAL
Qty: 18 TABLET | Refills: 0 | Status: SHIPPED | OUTPATIENT
Start: 2024-12-27 | End: 2025-01-05

## 2025-02-06 DIAGNOSIS — F41.9 ANXIETY: ICD-10-CM

## 2025-02-06 RX ORDER — PAROXETINE 30 MG/1
60 TABLET, FILM COATED ORAL DAILY
Qty: 180 TABLET | Refills: 1 | Status: SHIPPED | OUTPATIENT
Start: 2025-02-06

## 2025-02-14 ENCOUNTER — TELEPHONE (OUTPATIENT)
Age: 60
End: 2025-02-14

## 2025-02-14 NOTE — TELEPHONE ENCOUNTER
Patient called, states he dropped off a form yesterday on 2/13 that needed to be completed.  Patient is asking if it has been completed by Rhianna yet?    Patient states to please fill out the form as soon as possible and fax the forms please.    Patient requesting a call back today     Please Advise, thank you.

## 2025-02-17 ENCOUNTER — TELEPHONE (OUTPATIENT)
Dept: FAMILY MEDICINE CLINIC | Facility: CLINIC | Age: 60
End: 2025-02-17

## 2025-04-17 ENCOUNTER — OFFICE VISIT (OUTPATIENT)
Dept: GASTROENTEROLOGY | Facility: CLINIC | Age: 60
End: 2025-04-17
Payer: COMMERCIAL

## 2025-04-17 VITALS
BODY MASS INDEX: 33.19 KG/M2 | TEMPERATURE: 99.3 F | SYSTOLIC BLOOD PRESSURE: 117 MMHG | HEIGHT: 68 IN | OXYGEN SATURATION: 95 % | HEART RATE: 93 BPM | DIASTOLIC BLOOD PRESSURE: 78 MMHG | WEIGHT: 219 LBS

## 2025-04-17 DIAGNOSIS — Z12.11 SCREENING FOR COLON CANCER: ICD-10-CM

## 2025-04-17 DIAGNOSIS — D17.1 LIPOMA OF TORSO: ICD-10-CM

## 2025-04-17 DIAGNOSIS — R13.19 ESOPHAGEAL DYSPHAGIA: ICD-10-CM

## 2025-04-17 DIAGNOSIS — K21.9 GASTROESOPHAGEAL REFLUX DISEASE WITHOUT ESOPHAGITIS: ICD-10-CM

## 2025-04-17 DIAGNOSIS — K27.9 PUD (PEPTIC ULCER DISEASE): Primary | ICD-10-CM

## 2025-04-17 PROCEDURE — 99204 OFFICE O/P NEW MOD 45 MIN: CPT | Performed by: PHYSICIAN ASSISTANT

## 2025-04-17 NOTE — PATIENT INSTRUCTIONS
Scheduled date of EGD/colonoscopy (as of today):6/19/25  Physician performing EGD/colonoscopy:Tunde  Location of EGD/colonoscopy:[facility]  Desired bowel prep reviewed with patient:miralax/dulcolax  Instructions reviewed with patient by:Leeanna WHITTAKER  Clearances:   none

## 2025-04-17 NOTE — PROGRESS NOTES
Name: Cristhian Lee      : 1965      MRN: [id number]  Encounter Provider: Blanca Bhatia PA-C  Encounter Date: 2025   Encounter department: [facility]  :  Assessment & Plan  Screening for colon cancer    Orders:    Ambulatory Referral to Gastroenterology    Colonoscopy; Future  I obtained informed consent from the patient. The risks/benefits/alternatives of the procedure were discussed with the patient. Risks included, but not limited to, infection, bleeding, perforation, injury to organs in the abdomen, missed lesion and incomplete procedure were discussed. Patient was agreeable and electronic consent was signed.  - Colonoscopy as above with Chavez SimmonsaLASYD prep instructions will be given to the patient in the office  Gastroesophageal reflux disease without esophagitis    Orders:    Ambulatory Referral to Gastroenterology    EGD; Future  Patient reports history of peptic ulcer disease and was drinking alcohol heavily 12 years ago, but has since then been abstinent.  We also discussed that I am concerned about his ibuprofen usage since he uses about 2400 mg daily for his knee.  Reports that he is going to [facility] for potential knee replacement later this year.  I asked the patient to abstain from NSAIDs if possible, and to continue monitoring his bowel movements for melena or hematochezia.    -We will also plan for EGD to investigate  -Continue Nexium as he has been doing  -Avoid NSAIDs  -Monitor stool color  PUD (peptic ulcer disease)    Orders:    EGD; Future    Esophageal dysphagia    Orders:    EGD; Future    Lipoma of torso    Orders:    US abdominal wall; Future  Palpated in the left upper quadrant.  Patient would like to be sure, will send for ultrasound.  Follow-up after procedures.    History of Present Illness   HPI  Cristhian Lee is a 60 y.o. male with history of osteoarthritis dependent on NSAIDs, depression,  hyperlipidemia, chronic sinusitis, radiculopathy and GERD who presents to the office for EGD and colonoscopy consult.  From an upper GI standpoint, has been dependent on Pepcid Complete and Nexium.  Reports that on a regular basis he takes large amounts of NSAIDs for severe osteoarthritis in his knee.  Reports that he is going to [facility] for consideration of knee replacement over the summer hopefully.  Patient denies signs or GI bleeding.  In addition, he would like to schedule his screening colonoscopy.  Patient has no lower GI concerns at this time.    Patient was last seen in our office in 2022 and was scheduled to have colonoscopy, which patient canceled.  Patient reports that he had a colonoscopy last while he was in living in [place].  We do not have record of this prior colonoscopy.  Reports he has a history of peptic ulcer disease several years ago and was diagnosed with 3 ulcers, and at the time was drinking alcohol heavily.  Has been abstinent from alcohol for 12 years now.  He also quit smoking 20+ years ago.  There is no family history of GI malignancy to his knowledge.      Review of Systems   Constitutional:  Negative for appetite change, chills, diaphoresis, fatigue and unexpected weight change.   HENT:  Negative for sore throat and trouble swallowing.    Eyes:  Negative for discharge and redness.   Respiratory:  Negative for cough, shortness of breath and wheezing.    Cardiovascular:  Negative for chest pain and palpitations.   Gastrointestinal:  Negative for abdominal pain, anal bleeding, blood in stool, constipation, diarrhea, nausea, rectal pain and vomiting.   Endocrine: Negative for cold intolerance and heat intolerance.   Musculoskeletal:  Positive for arthralgias and joint swelling. Negative for myalgias.   Skin:  Negative for pallor and rash.   Neurological:  Negative for dizziness, tremors, weakness, light-headedness, numbness and headaches.   Hematological:  Negative for  "adenopathy. Does not bruise/bleed easily.   Psychiatric/Behavioral:  Negative for behavioral problems, confusion, dysphoric mood and sleep disturbance. The patient is not nervous/anxious.           Objective   /78 (BP Location: Left arm, Patient Position: Sitting, Cuff Size: Large)   Pulse 93   Temp 99.3 °F (37.4 °C) (Tympanic)   Ht 5' 8\" (1.727 m)   Wt 99.3 kg (219 lb)   SpO2 95%   BMI 33.30 kg/m²      Physical Exam  Constitutional:       General: He is not in acute distress.     Appearance: He is well-developed. He is not diaphoretic.   HENT:      Head: Normocephalic and atraumatic.   Eyes:      General: No scleral icterus.     Conjunctiva/sclera: Conjunctivae normal.      Pupils: Pupils are equal, round, and reactive to light.   Neck:      Thyroid: No thyromegaly.      Trachea: No tracheal deviation.   Cardiovascular:      Rate and Rhythm: Normal rate and regular rhythm.   Pulmonary:      Effort: Pulmonary effort is normal.      Breath sounds: Normal breath sounds. No wheezing or rales.   Abdominal:      General: Bowel sounds are normal. There is no distension.      Palpations: Abdomen is soft. Abdomen is not rigid. There is no mass.      Tenderness: There is no abdominal tenderness. There is no guarding or rebound.   Musculoskeletal:      Cervical back: Neck supple.   Lymphadenopathy:      Cervical: No cervical adenopathy.   Skin:     General: Skin is warm and dry.      Findings: No erythema or rash.   Neurological:      Mental Status: He is alert and oriented to person, place, and time.   Psychiatric:         Behavior: Behavior normal.           "

## 2025-04-30 ENCOUNTER — TELEPHONE (OUTPATIENT)
Age: 60
End: 2025-04-30

## 2025-04-30 NOTE — TELEPHONE ENCOUNTER
Patient called in stating that he had nose surgery a couple years ago and he believes the inside tip of his nose may be infected. Patient stated there is clear liquid.    Patient also stated he had a tick bite on his shoulder a couple days ago. Patient requested antibiotic to treat both issues. I advised patient be scheduled to be further evaluated.     Attempted to schedule. I offered to make apts with other providers and patient kindly declined. Patient stated Rhianna is familiar and would be more comfortable seeing her.     Attempted to warm transfer and was unsuccessful. Patient is aware Rhianna is not in office today 4/30. I offered nurse triage and patient kindly declined.     Please return call with recommendations & to assist with scheduling.     Please advise  Thank you

## 2025-05-01 ENCOUNTER — TELEPHONE (OUTPATIENT)
Dept: GASTROENTEROLOGY | Facility: CLINIC | Age: 60
End: 2025-05-01

## 2025-05-01 ENCOUNTER — OFFICE VISIT (OUTPATIENT)
Dept: FAMILY MEDICINE CLINIC | Facility: CLINIC | Age: 60
End: 2025-05-01
Payer: COMMERCIAL

## 2025-05-01 VITALS
TEMPERATURE: 97.8 F | BODY MASS INDEX: 33.68 KG/M2 | WEIGHT: 222.2 LBS | HEART RATE: 90 BPM | HEIGHT: 68 IN | DIASTOLIC BLOOD PRESSURE: 78 MMHG | SYSTOLIC BLOOD PRESSURE: 124 MMHG | OXYGEN SATURATION: 98 %

## 2025-05-01 DIAGNOSIS — J32.0 CHRONIC MAXILLARY SINUSITIS: ICD-10-CM

## 2025-05-01 DIAGNOSIS — S40.861A TICK BITE OF RIGHT UPPER ARM, INITIAL ENCOUNTER: Primary | ICD-10-CM
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
## 2025-05-01 DIAGNOSIS — E55.9 VITAMIN D DEFICIENCY: ICD-10-CM

## 2025-05-01 PROCEDURE — 99213 OFFICE O/P EST LOW 20 MIN: CPT | Performed by: NURSE PRACTITIONER

## 2025-05-01 RX ORDER — ERGOCALCIFEROL 1.25 MG/1
50000 CAPSULE, LIQUID FILLED ORAL WEEKLY
Qty: 12 CAPSULE | Refills: 3 | Status: SHIPPED | OUTPATIENT
Start: 2025-05-01

## 2025-05-01 RX ORDER — PREDNISONE 20 MG/1
TABLET ORAL
Qty: 18 TABLET | Refills: 0 | Status: SHIPPED | OUTPATIENT
Start: 2025-05-01 | End: 2025-05-10

## 2025-05-01 NOTE — PROGRESS NOTES
Name: Cristhian Lee      : 1965      MRN: [id number]  Encounter Provider: KAREL Sharma  Encounter Date: 2025   Encounter department: [facility]  :  Assessment & Plan  Tick bite of right upper arm, initial encounter  Exposure to tick and not implanted          Chronic maxillary sinusitis    Orders:    amoxicillin-clavulanate (AUGMENTIN) 875-125 mg per tablet; Take 1 tablet by mouth every 12 (twelve) hours for 7 days    predniSONE 20 mg tablet; Take 1 tablet (20 mg total) by mouth 3 (three) times a day before meals for 3 days, THEN 1 tablet (20 mg total) 2 (two) times a day with meals for 3 days, THEN 1 tablet (20 mg total) daily with breakfast for 3 days.  Patient with chronic sinus symptoms will order the Augmentin and prednisone          History of Present Illness   Patien there today and reports that he was sitting and felt something on his shoulder and wiped his arm and looked on the floor and was found to be a tick this happened 2 days ago He had skin check by his wife and not seen any additional or bulls eye rash.     Patient also reports that the inside of his nose he has had some pustule in the inside of his nose on the left nostril     Patient jey has chronic sinus issues and reports that he is having problems with breathing through his noses he is using astelin      Review of Systems   Constitutional:  Negative for activity change, appetite change, chills, diaphoresis, fatigue, fever and unexpected weight change.   HENT:  Positive for congestion, postnasal drip and rhinorrhea. Negative for ear pain, hearing loss, sinus pressure, sinus pain, sneezing and sore throat.    Eyes:  Negative for pain, redness and visual disturbance.   Respiratory:  Negative for cough and shortness of breath.    Cardiovascular:  Negative for chest pain and leg swelling.   Gastrointestinal:  Negative for abdominal pain, diarrhea, nausea and vomiting.   Endocrine: Negative.   "  Genitourinary: Negative.    Musculoskeletal:  Negative for arthralgias.   Allergic/Immunologic: Negative.    Neurological:  Negative for dizziness and light-headedness.   Hematological: Negative.    Psychiatric/Behavioral:  Negative for behavioral problems and dysphoric mood.        Objective   /78 (BP Location: Left arm, Patient Position: Sitting)   Pulse 90   Temp 97.8 °F (36.6 °C) (Temporal)   Ht 5' 8\" (1.727 m)   Wt 101 kg (222 lb 3.2 oz)   SpO2 98%   BMI 33.79 kg/m²      Physical Exam  Constitutional:       General: He is not in acute distress.     Appearance: He is well-developed.   HENT:      Head: Normocephalic and atraumatic.     Eyes:      Pupils: Pupils are equal, round, and reactive to light.   Neck:      Thyroid: No thyromegaly.   Cardiovascular:      Rate and Rhythm: Normal rate and regular rhythm.      Heart sounds: Normal heart sounds. No murmur heard.  Pulmonary:      Effort: Pulmonary effort is normal. No respiratory distress.      Breath sounds: Normal breath sounds. No wheezing.   Abdominal:      General: Bowel sounds are normal.      Palpations: Abdomen is soft.   Musculoskeletal:         General: Normal range of motion.      Cervical back: Normal range of motion.   Skin:     General: Skin is warm and dry.   Neurological:      Mental Status: He is alert and oriented to person, place, and time.         "

## 2025-05-05 ENCOUNTER — TELEPHONE (OUTPATIENT)
Age: 60
End: 2025-05-05

## 2025-05-05 NOTE — TELEPHONE ENCOUNTER
Pt called to cancel his appointment scheduled for today. States he was seen in the office by PCP this past Friday.     Appointment has been cancelled.

## 2025-06-17 ENCOUNTER — OFFICE VISIT (OUTPATIENT)
Dept: FAMILY MEDICINE CLINIC | Facility: CLINIC | Age: 60
End: 2025-06-17
Payer: COMMERCIAL

## 2025-06-17 VITALS
TEMPERATURE: 98.1 F | OXYGEN SATURATION: 98 % | HEIGHT: 68 IN | BODY MASS INDEX: 32.58 KG/M2 | HEART RATE: 101 BPM | SYSTOLIC BLOOD PRESSURE: 118 MMHG | DIASTOLIC BLOOD PRESSURE: 82 MMHG | WEIGHT: 215 LBS

## 2025-06-17 DIAGNOSIS — R06.02 SOB (SHORTNESS OF BREATH): ICD-10-CM

## 2025-06-17 DIAGNOSIS — J01.00 ACUTE NON-RECURRENT MAXILLARY SINUSITIS: Primary | ICD-10-CM

## 2025-06-17 LAB
SARS-COV-2 AG UPPER RESP QL IA: NEGATIVE
VALID CONTROL: NORMAL

## 2025-06-17 PROCEDURE — 87811 SARS-COV-2 COVID19 W/OPTIC: CPT

## 2025-06-17 PROCEDURE — 99213 OFFICE O/P EST LOW 20 MIN: CPT

## 2025-06-17 PROCEDURE — 93000 ELECTROCARDIOGRAM COMPLETE: CPT

## 2025-06-17 NOTE — PROGRESS NOTES
Name: Cristhian Lee      : 1965      MRN: [id number]  Encounter Provider: Lucien Salinas PA-C  Encounter Date: 2025   Encounter department: [facility]    Assessment & Plan  Acute non-recurrent maxillary sinusitis  Pt complains of sx including dry cough, productive cough, post nasal drip, sinus pressure, and sinus and nasal congestion  Sx have been present for 3 day(s) and has been getting worse since onset.   Pt has attempted to alleviate their current sx with mucinex DM which has provided some relief.   Pertinent negatives: no chest pain, difficulty breathing, nausea, vomiting, or diarrhea . PMH is significant for chronic sinusitis  Examination revealing congested phonation, pharyngeal erythema, marked sinus tenderness to palpation  Vitals otherwise stable, COVID-negative  Based on history symptoms and exam findings, will cover for infection for sinusitis given significant history and patient's subjective feelings of having sinuitis recurrence  Advised probiotic/treat with antibiotic, rest, increase hydration, Mucinex  Has EGD/colonoscopy scheduled for Thursday, recommended rescheduling in the setting of infection  Patient agreeable, follow-up as needed for worse or nonimprovement  Orders:    POCT Rapid Covid Ag    amoxicillin-clavulanate (AUGMENTIN) 875-125 mg per tablet; Take 1 tablet by mouth every 12 (twelve) hours for 10 days Take with food.    SOB (shortness of breath)  Patient endorses mild shortness of breath which she states is secondary to postnasal drip and phlegm  Vitals are otherwise stable with reassuring SpO2  EKG reveals normal sinus rhythm without abnormality, COVID-negative  Consider in the setting of sinusitis as above, continue antibiotic and continue to monitor   Orders:    POCT ECG    amoxicillin-clavulanate (AUGMENTIN) 875-125 mg per tablet; Take 1 tablet by mouth every 12 (twelve) hours for 10 days Take with food.         History of Present Illness  "    Cristhian Lee is a 60 y.o. male  presenting with concerns of cough and congestion, with associated SOB. He is scheduled for colonoscopy and EGD and questions if it is appropriate for him to continue with the studies with how he is feeling.        **Note: Portions of the record may have been created with voice recognition software.  Occasional wrong word or \"sound alike\" substitutions may have occurred due to the inherent limitations of voice recognition software.  Please read the chart carefully and recognize, using context, where substitutions have occurred. Please contact for further clarification, when necessary.     Shortness of Breath  Associated symptoms include coughing and rhinorrhea. Pertinent negatives include no chest pain or palpitations.     Review of Systems   Constitutional:  Negative for chills and fever.   HENT:  Positive for congestion, postnasal drip, rhinorrhea and sinus pressure. Negative for ear pain.    Eyes:  Negative for pain and visual disturbance.   Respiratory:  Positive for cough and shortness of breath.    Cardiovascular:  Negative for chest pain and palpitations.   Gastrointestinal:  Negative for abdominal pain, constipation, diarrhea, nausea and vomiting.   Genitourinary:  Negative for dysuria and hematuria.   Musculoskeletal:  Negative for arthralgias and back pain.   Skin:  Negative for color change and rash.   Neurological:  Negative for seizures and syncope.   All other systems reviewed and are negative.    Past Medical History[1]  Past Surgical History[2]  Family History[3]  Social History[4]  Medications[5]  Allergies   Allergen Reactions    Meperidine Irritability, Myalgia and Other (See Comments)     Increase pain, anger, irritability    Demerol    Quinolones     Sulfamethoxazole-Trimethoprim Other (See Comments)     Bactrim     Immunization History   Administered Date(s) Administered    COVID-19 PFIZER VACCINE 0.3 ML IM 04/30/2021, 04/30/2021, 05/21/2021, " "05/21/2021, 01/15/2022    INFLUENZA 12/19/2018, 11/11/2020, 11/15/2021, 11/15/2022, 11/15/2022, 11/17/2023, 11/17/2023    Influenza Quadrivalent Preservative Free 3 years and older IM 01/20/2014    Influenza, injectable, quadrivalent, preservative free 0.5 mL 12/19/2018, 11/17/2023    Influenza, recombinant, quadrivalent,injectable, preservative free 11/11/2020, 11/15/2021, 11/15/2022    MMR 05/02/2019, 05/02/2019    TD (adult) Preservative Free 08/06/2010    Tetanus, adsorbed 08/06/2010     Objective   /82   Pulse 101   Temp 98.1 °F (36.7 °C)   Ht 5' 8\" (1.727 m)   Wt 97.5 kg (215 lb)   SpO2 98%   BMI 32.69 kg/m²     Physical Exam  Vitals and nursing note reviewed.   Constitutional:       General: He is not in acute distress.     Appearance: He is well-developed.   HENT:      Head: Normocephalic and atraumatic.      Nose: Congestion and rhinorrhea present.      Right Sinus: Maxillary sinus tenderness present.      Left Sinus: Maxillary sinus tenderness present.      Mouth/Throat:      Pharynx: Posterior oropharyngeal erythema present.     Eyes:      Conjunctiva/sclera: Conjunctivae normal.       Cardiovascular:      Rate and Rhythm: Normal rate and regular rhythm.      Heart sounds: No murmur heard.  Pulmonary:      Effort: Pulmonary effort is normal. No respiratory distress.      Breath sounds: Normal breath sounds.   Abdominal:      Palpations: Abdomen is soft.      Tenderness: There is no abdominal tenderness.     Musculoskeletal:         General: No swelling.      Cervical back: Neck supple.     Skin:     General: Skin is warm and dry.      Capillary Refill: Capillary refill takes less than 2 seconds.     Neurological:      Mental Status: He is alert.     Psychiatric:         Mood and Affect: Mood normal.                [1]   Past Medical History:  Diagnosis Date    Addiction to drug (HCC)     Adjustment disorder     Allergic Unlnown    Airborn    Anxiety Age 16    Benign localized prostatic " hyperplasia with lower urinary tract symptoms (LUTS)     Last Assessed:8/25/2015    Cognitive impairment     Depression Age 16    GERD (gastroesophageal reflux disease) Age 30    Headache(784.0)     Hypogonadism in male     last assessed:8/25/2015    Obsessive-compulsive disorder     Panic attack     PONV (postoperative nausea and vomiting)     Shortness of breath     exertional MD aware    Sleep difficulties     Visual impairment Age 50    Started  to wear glasses    Withdrawal symptoms, drug or narcotic (HCC)    [2]   Past Surgical History:  Procedure Laterality Date    APPENDECTOMY      KNEE SURGERY Left 09/04/2024    a/pmm    NOSE SURGERY  2020    OTHER SURGICAL HISTORY      testicular torsion surgery    NM ARTHROSCOPY KNEE W/MENISCUS RPR MEDIAL/LATERAL Left 09/04/2024    Procedure: Left knee arthroscopy, partial medial meniscectomy, chondroplasty medial femoral condyle;  Surgeon: Steve Fletcher DO;  Location: [facility];  Service: Orthopedics    NM CYSTOURETHROSCOPY N/A 01/19/2024    Procedure: CYSTOSCOPY, TRUS;  Surgeon: Diego Kasper MD;  Location[facility];  Service: Urology    VASECTOMY      Last Assessed:4/7/2015    VASECTOMY REVERSAL     [3]   Family History  Problem Relation Name Age of Onset    Glaucoma Mother Anastasia Lee     Dementia Mother Anastasia Lee     Alcohol abuse Maternal Grandfather Silviano Milian     Anxiety disorder Sister Chayito Lee     Completed Suicide  Sister Chayito Lee         Drug overdose    Depression Sister Chayito Lee     Drug abuse Sister Chayito Lee     Substance Abuse Neg Hx      Mental illness Neg Hx     [4]   Social History  Tobacco Use    Smoking status: Former     Passive exposure: Past    Smokeless tobacco: Never   Vaping Use    Vaping status: Never Used   Substance and Sexual Activity    Alcohol use: No    Drug use: Never   [5]   Current Outpatient Medications on File Prior to Visit   Medication Sig    cetirizine (ZyrTEC) 10 mg tablet Take 10 mg  by mouth in the morning.    Coenzyme Q10 (Co Q 10) 10 MG CAPS Take by mouth    ergocalciferol (VITAMIN D2) 50,000 units TAKE 1 CAPSULE ONCE A WEEK    ibuprofen (MOTRIN) 800 mg tablet Take 1 tablet by mouth 3 times daily, then take as needed. Take with food and water. Discontinue if stomach upset occurs.    NON FORMULARY Mineral mix    Omega-3 Fatty Acids (FISH OIL PO) Take by mouth    PARoxetine (PAXIL) 30 mg tablet TAKE 2 TABLETS DAILY    tadalafil (CIALIS) 5 MG tablet Take 1 tablet (5 mg total) by mouth daily as needed (BPH)    tamsulosin (FLOMAX) 0.4 mg Take 2 capsules (0.8 mg total) by mouth daily with dinner for 14 days

## 2025-06-19 ENCOUNTER — TELEPHONE (OUTPATIENT)
Age: 60
End: 2025-06-19

## 2025-06-19 NOTE — TELEPHONE ENCOUNTER
rescheduled date of EGD/colonoscopy (as of today):10/24/2025  Physician performing EGD/colonoscopy:Dr Griffiths  Location of EGD/colonoscopy:[facility] bowel prep reviewed with patient:Miralax/Dulcolax  Instructions reviewed with patient by:office provided instructions  Clearances:  N/A

## 2025-07-01 DIAGNOSIS — N13.8 BPH WITH OBSTRUCTION/LOWER URINARY TRACT SYMPTOMS: ICD-10-CM

## 2025-07-01 DIAGNOSIS — N40.1 BPH WITH OBSTRUCTION/LOWER URINARY TRACT SYMPTOMS: ICD-10-CM

## 2025-07-01 RX ORDER — TADALAFIL 5 MG/1
5 TABLET ORAL DAILY PRN
Qty: 90 TABLET | Refills: 0 | Status: SHIPPED | OUTPATIENT
Start: 2025-07-01

## 2025-07-01 NOTE — TELEPHONE ENCOUNTER
Reason for call:   [x] Refill   [] Prior Auth  [] Other:     Office:   [x] PCP/Provider - [facility]  [] Specialty/Provider -     Medication: Tadalafil 5mg    Dose/Frequency: 1 tab daily     Quantity: 90    Pharmacy: [id number] - [place] - [address] [place] [address] [phone]     Local Pharmacy   Does the patient have enough for 3 days?   [] Yes   [x] No - Send as HP to POD

## 2025-07-10 ENCOUNTER — HOSPITAL ENCOUNTER (OUTPATIENT)
Dept: ULTRASOUND IMAGING | Facility: HOSPITAL | Age: 60
End: 2025-07-10
Attending: PHYSICIAN ASSISTANT
Payer: COMMERCIAL

## 2025-07-10 DIAGNOSIS — D17.1 LIPOMA OF TORSO: ICD-10-CM

## 2025-07-10 PROCEDURE — 76705 ECHO EXAM OF ABDOMEN: CPT

## 2025-08-05 DIAGNOSIS — F41.9 ANXIETY: ICD-10-CM

## 2025-08-06 RX ORDER — PAROXETINE 30 MG/1
60 TABLET, FILM COATED ORAL DAILY
Qty: 180 TABLET | Refills: 1 | Status: SHIPPED | OUTPATIENT
Start: 2025-08-06

## 2025-08-18 ENCOUNTER — TELEPHONE (OUTPATIENT)
Dept: FAMILY MEDICINE CLINIC | Facility: CLINIC | Age: 60
End: 2025-08-18

## 2025-08-18 DIAGNOSIS — N41.1 CHRONIC PROSTATITIS: Primary | ICD-10-CM

## 2025-08-18 RX ORDER — DICLOFENAC POTASSIUM 50 MG/1
50 TABLET, FILM COATED ORAL 2 TIMES DAILY
Qty: 60 TABLET | Refills: 3 | Status: SHIPPED | OUTPATIENT
Start: 2025-08-18 | End: 2025-09-17

## 2025-08-19 ENCOUNTER — APPOINTMENT (OUTPATIENT)
Dept: LAB | Facility: CLINIC | Age: 60
End: 2025-08-19
Payer: COMMERCIAL

## 2025-08-19 DIAGNOSIS — R53.83 FATIGUE, UNSPECIFIED TYPE: ICD-10-CM

## 2025-08-19 DIAGNOSIS — Z13.0 SCREENING FOR IRON DEFICIENCY ANEMIA: ICD-10-CM

## 2025-08-19 DIAGNOSIS — N41.1 CHRONIC PROSTATITIS: Primary | ICD-10-CM

## 2025-08-19 DIAGNOSIS — F52.21 ERECTILE DYSFUNCTION OF NONORGANIC ORIGIN: ICD-10-CM

## 2025-08-19 DIAGNOSIS — E56.8 DEFICIENCY OF OTHER VITAMINS: ICD-10-CM

## 2025-08-19 DIAGNOSIS — Z13.220 SCREENING FOR LIPOID DISORDERS: ICD-10-CM

## 2025-08-19 DIAGNOSIS — Z79.899 POLYPHARMACY: ICD-10-CM

## 2025-08-19 DIAGNOSIS — Z12.5 SPECIAL SCREENING FOR MALIGNANT NEOPLASM OF PROSTATE: ICD-10-CM

## 2025-08-19 DIAGNOSIS — Z13.1 SCREENING FOR DIABETES MELLITUS: ICD-10-CM

## 2025-08-19 DIAGNOSIS — R29.898 DEFICIENCIES OF LIMBS: ICD-10-CM

## 2025-08-19 DIAGNOSIS — R68.82 DECREASED LIBIDO: ICD-10-CM

## 2025-08-19 LAB
ALBUMIN SERPL BCG-MCNC: 4.2 G/DL (ref 3.5–5)
ALP SERPL-CCNC: 70 U/L (ref 34–104)
ALT SERPL W P-5'-P-CCNC: 22 U/L (ref 7–52)
ANION GAP SERPL CALCULATED.3IONS-SCNC: 12 MMOL/L (ref 4–13)
AST SERPL W P-5'-P-CCNC: 18 U/L (ref 13–39)
BASOPHILS # BLD AUTO: 0.04 THOUSANDS/ÂΜL (ref 0–0.1)
BASOPHILS NFR BLD AUTO: 1 % (ref 0–1)
BILIRUB SERPL-MCNC: 0.52 MG/DL (ref 0.2–1)
BUN SERPL-MCNC: 13 MG/DL (ref 5–25)
CALCIUM SERPL-MCNC: 9.1 MG/DL (ref 8.4–10.2)
CHLORIDE SERPL-SCNC: 104 MMOL/L (ref 96–108)
CHOLEST SERPL-MCNC: 209 MG/DL (ref ?–200)
CO2 SERPL-SCNC: 24 MMOL/L (ref 21–32)
CORTIS SERPL-MCNC: 13.5 UG/DL
CREAT SERPL-MCNC: 0.73 MG/DL (ref 0.6–1.3)
CRP SERPL HS-MCNC: 0.48 MG/L
EOSINOPHIL # BLD AUTO: 0.16 THOUSAND/ÂΜL (ref 0–0.61)
EOSINOPHIL NFR BLD AUTO: 3 % (ref 0–6)
ERYTHROCYTE [DISTWIDTH] IN BLOOD BY AUTOMATED COUNT: 12.2 % (ref 11.6–15.1)
ERYTHROCYTE [SEDIMENTATION RATE] IN BLOOD: 9 MM/HOUR (ref 0–19)
EST. AVERAGE GLUCOSE BLD GHB EST-MCNC: 114 MG/DL
ESTRADIOL SERPL-MCNC: 39.8 PG/ML (ref 0–33.1)
FERRITIN SERPL-MCNC: 34 NG/ML (ref 30–336)
FSH SERPL-ACNC: 8.9 MIU/ML (ref 1.3–19.3)
GFR SERPL CREATININE-BSD FRML MDRD: 100 ML/MIN/1.73SQ M
GGT SERPL-CCNC: 23 U/L (ref 9–64)
GLUCOSE P FAST SERPL-MCNC: 91 MG/DL (ref 65–99)
HBA1C MFR BLD: 5.6 %
HCT VFR BLD AUTO: 42.6 % (ref 36.5–49.3)
HCYS SERPL-SCNC: 12.6 UMOL/L (ref 5–15)
HDLC SERPL-MCNC: 47 MG/DL
HGB BLD-MCNC: 14 G/DL (ref 12–17)
IMM GRANULOCYTES # BLD AUTO: 0.01 THOUSAND/UL (ref 0–0.2)
IMM GRANULOCYTES NFR BLD AUTO: 0 % (ref 0–2)
IRON SATN MFR SERPL: 27 % (ref 15–50)
IRON SERPL-MCNC: 103 UG/DL (ref 50–212)
LDH SERPL-CCNC: 205 U/L (ref 140–271)
LDLC SERPL CALC-MCNC: 142 MG/DL (ref 0–100)
LH SERPL-ACNC: 7.9 MIU/ML (ref 1.2–8.6)
LYMPHOCYTES # BLD AUTO: 1.18 THOUSANDS/ÂΜL (ref 0.6–4.47)
LYMPHOCYTES NFR BLD AUTO: 25 % (ref 14–44)
MAGNESIUM SERPL-MCNC: 2.1 MG/DL (ref 1.9–2.7)
MCH RBC QN AUTO: 33.7 PG (ref 26.8–34.3)
MCHC RBC AUTO-ENTMCNC: 32.9 G/DL (ref 31.4–37.4)
MCV RBC AUTO: 103 FL (ref 82–98)
MONOCYTES # BLD AUTO: 0.45 THOUSAND/ÂΜL (ref 0.17–1.22)
MONOCYTES NFR BLD AUTO: 10 % (ref 4–12)
NEUTROPHILS # BLD AUTO: 2.87 THOUSANDS/ÂΜL (ref 1.85–7.62)
NEUTS SEG NFR BLD AUTO: 61 % (ref 43–75)
NONHDLC SERPL-MCNC: 162 MG/DL
NRBC BLD AUTO-RTO: 0 /100 WBCS
PHOSPHATE SERPL-MCNC: 3.8 MG/DL (ref 2.3–4.1)
PLATELET # BLD AUTO: 318 THOUSANDS/UL (ref 149–390)
PMV BLD AUTO: 9.8 FL (ref 8.9–12.7)
POTASSIUM SERPL-SCNC: 4.1 MMOL/L (ref 3.5–5.3)
PROGEST SERPL-MCNC: 1.01 NG/ML (ref 0.14–2.06)
PROLACTIN SERPL-MCNC: 10.6 NG/ML (ref 2.64–13.13)
PROT SERPL-MCNC: 6.6 G/DL (ref 6.4–8.4)
PSA FREE MFR SERPL: 26.52 %
PSA FREE SERPL-MCNC: 0.29 NG/ML
PSA SERPL-MCNC: 1.09 NG/ML (ref 0–4)
RBC # BLD AUTO: 4.15 MILLION/UL (ref 3.88–5.62)
SODIUM SERPL-SCNC: 140 MMOL/L (ref 135–147)
T3 SERPL-MCNC: 1.5 NG/ML (ref 0.9–1.8)
T3FREE SERPL-MCNC: 3.05 PG/ML (ref 2.5–3.9)
T4 FREE SERPL-MCNC: 0.62 NG/DL (ref 0.61–1.12)
T4 SERPL-MCNC: 6.55 UG/DL (ref 6.09–12.23)
TIBC SERPL-MCNC: 376.6 UG/DL (ref 250–450)
TRANSFERRIN SERPL-MCNC: 269 MG/DL (ref 203–362)
TRIGL SERPL-MCNC: 98 MG/DL (ref ?–150)
TSH SERPL DL<=0.05 MIU/L-ACNC: 1.15 UIU/ML (ref 0.45–4.5)
UIBC SERPL-MCNC: 274 UG/DL (ref 155–355)
URATE SERPL-MCNC: 5 MG/DL (ref 3.5–8.5)
WBC # BLD AUTO: 4.71 THOUSAND/UL (ref 4.31–10.16)

## 2025-08-19 PROCEDURE — 82642 DIHYDROTESTOSTERONE: CPT

## 2025-08-19 PROCEDURE — 36415 COLL VENOUS BLD VENIPUNCTURE: CPT

## 2025-08-19 PROCEDURE — 83002 ASSAY OF GONADOTROPIN (LH): CPT

## 2025-08-19 PROCEDURE — 82679 ASSAY OF ESTRONE: CPT

## 2025-08-19 PROCEDURE — 84443 ASSAY THYROID STIM HORMONE: CPT

## 2025-08-19 PROCEDURE — 82626 DEHYDROEPIANDROSTERONE: CPT

## 2025-08-19 PROCEDURE — 84436 ASSAY OF TOTAL THYROXINE: CPT

## 2025-08-19 PROCEDURE — 80061 LIPID PANEL: CPT

## 2025-08-19 PROCEDURE — 86141 C-REACTIVE PROTEIN HS: CPT

## 2025-08-19 PROCEDURE — 82533 TOTAL CORTISOL: CPT

## 2025-08-19 PROCEDURE — 84270 ASSAY OF SEX HORMONE GLOBUL: CPT

## 2025-08-19 PROCEDURE — 84402 ASSAY OF FREE TESTOSTERONE: CPT

## 2025-08-19 PROCEDURE — 82728 ASSAY OF FERRITIN: CPT

## 2025-08-19 PROCEDURE — 84144 ASSAY OF PROGESTERONE: CPT

## 2025-08-19 PROCEDURE — 83615 LACTATE (LD) (LDH) ENZYME: CPT

## 2025-08-19 PROCEDURE — 84403 ASSAY OF TOTAL TESTOSTERONE: CPT

## 2025-08-19 PROCEDURE — 83036 HEMOGLOBIN GLYCOSYLATED A1C: CPT

## 2025-08-19 PROCEDURE — 84154 ASSAY OF PSA FREE: CPT

## 2025-08-19 PROCEDURE — 84146 ASSAY OF PROLACTIN: CPT

## 2025-08-19 PROCEDURE — 80053 COMPREHEN METABOLIC PANEL: CPT

## 2025-08-19 PROCEDURE — 82977 ASSAY OF GGT: CPT

## 2025-08-19 PROCEDURE — 84480 ASSAY TRIIODOTHYRONINE (T3): CPT

## 2025-08-19 PROCEDURE — 84481 FREE ASSAY (FT-3): CPT

## 2025-08-19 PROCEDURE — 86376 MICROSOMAL ANTIBODY EACH: CPT

## 2025-08-19 PROCEDURE — 83550 IRON BINDING TEST: CPT

## 2025-08-19 PROCEDURE — 85025 COMPLETE CBC W/AUTO DIFF WBC: CPT

## 2025-08-19 PROCEDURE — 82670 ASSAY OF TOTAL ESTRADIOL: CPT

## 2025-08-19 PROCEDURE — 82677 ASSAY OF ESTRIOL: CPT

## 2025-08-19 PROCEDURE — 84439 ASSAY OF FREE THYROXINE: CPT

## 2025-08-19 PROCEDURE — 84153 ASSAY OF PSA TOTAL: CPT

## 2025-08-19 PROCEDURE — 85652 RBC SED RATE AUTOMATED: CPT

## 2025-08-19 PROCEDURE — 83540 ASSAY OF IRON: CPT

## 2025-08-19 PROCEDURE — 84482 T3 REVERSE: CPT

## 2025-08-19 PROCEDURE — 84100 ASSAY OF PHOSPHORUS: CPT

## 2025-08-19 PROCEDURE — 83735 ASSAY OF MAGNESIUM: CPT

## 2025-08-19 PROCEDURE — 82627 DEHYDROEPIANDROSTERONE: CPT

## 2025-08-19 PROCEDURE — 84550 ASSAY OF BLOOD/URIC ACID: CPT

## 2025-08-19 PROCEDURE — 83090 ASSAY OF HOMOCYSTEINE: CPT

## 2025-08-19 PROCEDURE — 83001 ASSAY OF GONADOTROPIN (FSH): CPT

## 2025-08-19 RX ORDER — DOXYCYCLINE HYCLATE 100 MG
100 TABLET ORAL 2 TIMES DAILY
Qty: 28 TABLET | Refills: 0 | Status: SHIPPED | OUTPATIENT
Start: 2025-08-19 | End: 2025-09-02

## 2025-08-21 LAB
DHEA-S SERPL-MCNC: 44.4 UG/DL (ref 48.9–344.2)
SHBG SERPL-SCNC: 34.8 NMOL/L (ref 19.3–76.4)
TESTOST FREE SERPL-MCNC: 7.7 PG/ML (ref 6.6–18.1)
TESTOST SERPL-MCNC: 468 NG/DL (ref 264–916)
THYROPEROXIDASE AB SERPL-ACNC: <9 IU/ML (ref 0–34)

## 2025-08-22 LAB
ESTRIOL SERPL-MCNC: <0.2 NG/ML
ESTRONE SERPL-MCNC: 33 PG/ML (ref 0–174)

## 2025-08-23 LAB — T3REVERSE SERPL-MCNC: 9.8 NG/DL (ref 9.2–24.1)

## (undated) DEVICE — IMPERVIOUS STOCKINETTE: Brand: DEROYAL

## (undated) DEVICE — ACE WRAP 6 IN STERILE

## (undated) DEVICE — CHLORAPREP HI-LITE 26ML ORANGE

## (undated) DEVICE — OCCLUSIVE GAUZE STRIP,3% BISMUTH TRIBROMOPHENATE IN PETROLATUM BLEND: Brand: XEROFORM

## (undated) DEVICE — GLOVE INDICATOR PI UNDERGLOVE SZ 7.5 BLUE

## (undated) DEVICE — SCD SEQUENTIAL COMPRESSION COMFORT SLEEVE MEDIUM KNEE LENGTH: Brand: KENDALL SCD

## (undated) DEVICE — GLOVE SRG BIOGEL ECLIPSE 7.5

## (undated) DEVICE — SPECIMEN CONTAINER STERILE PEEL PACK

## (undated) DEVICE — ABDOMINAL PAD: Brand: DERMACEA

## (undated) DEVICE — STERILE SURGICAL LUBRICANT,  TUBE: Brand: SURGILUBE

## (undated) DEVICE — INTENDED FOR TISSUE SEPARATION, AND OTHER PROCEDURES THAT REQUIRE A SHARP SURGICAL BLADE TO PUNCTURE OR CUT.: Brand: BARD-PARKER ® CARBON RIB-BACK BLADES

## (undated) DEVICE — PREMIUM DRY TRAY LF: Brand: MEDLINE INDUSTRIES, INC.

## (undated) DEVICE — TUBING SUCTION 5MM X 12 FT

## (undated) DEVICE — DRAPE EQUIPMENT RF WAND

## (undated) DEVICE — BETHLEHEM UNIVERSAL  ARTHRO PK: Brand: CARDINAL HEALTH

## (undated) DEVICE — TUBING ARTHROSCOPIC WAVE  MAIN PUMP

## (undated) DEVICE — UROLOGIC DRAIN BAG: Brand: UNBRANDED

## (undated) DEVICE — GLOVE INDICATOR PI UNDERGLOVE SZ 8 BLUE

## (undated) DEVICE — INVIEW CLEAR LEGGINGS: Brand: CONVERTORS

## (undated) DEVICE — PADDING CAST 4 IN  COTTON STRL

## (undated) DEVICE — BLADE SHAVER DISSECTOR 4MM 13CM COOLCUT

## (undated) DEVICE — CUFF TOURNIQUET 34 X 4 IN QUICK CONNECT DISP 1BLA

## (undated) DEVICE — CHLORHEXIDINE 4PCT 4 OZ

## (undated) DEVICE — 4-PORT MANIFOLD: Brand: NEPTUNE 2

## (undated) DEVICE — PROBE ABLATION  APOLLO RF 90 DEG MULTI PORT

## (undated) DEVICE — PACK TUR

## (undated) DEVICE — GAUZE SPONGES,16 PLY: Brand: CURITY

## (undated) DEVICE — CATH URETERAL 5FR X 70 CM FLEX TIP POLYUR BARD

## (undated) DEVICE — LIGHT GLOVE GREEN

## (undated) DEVICE — SUT ETHILON 3-0 PS-1 18 IN 1663H

## (undated) DEVICE — GLOVE SRG BIOGEL 7.5

## (undated) DEVICE — LIGHT HANDLE COVER SLEEVE DISP BLUE STELLAR

## (undated) DEVICE — GUIDEWIRE STRGHT TIP 0.035 IN  SOLO PLUS